# Patient Record
Sex: FEMALE | Race: WHITE | Employment: OTHER | URBAN - METROPOLITAN AREA
[De-identification: names, ages, dates, MRNs, and addresses within clinical notes are randomized per-mention and may not be internally consistent; named-entity substitution may affect disease eponyms.]

---

## 2017-06-20 ENCOUNTER — HOSPITAL ENCOUNTER (OUTPATIENT)
Dept: MAMMOGRAPHY | Age: 77
Discharge: HOME | End: 2017-06-20
Attending: NURSE PRACTITIONER | Admitting: NURSE PRACTITIONER

## 2017-06-20 DIAGNOSIS — Z12.31 ENCOUNTER FOR SCREENING MAMMOGRAM FOR BREAST CANCER: ICD-10-CM

## 2017-07-29 ENCOUNTER — OFFICE VISIT (OUTPATIENT)
Dept: ORTHOPEDIC SURGERY | Age: 77
End: 2017-07-29

## 2017-07-29 VITALS
DIASTOLIC BLOOD PRESSURE: 76 MMHG | SYSTOLIC BLOOD PRESSURE: 128 MMHG | HEIGHT: 62 IN | BODY MASS INDEX: 29.44 KG/M2 | HEART RATE: 66 BPM | WEIGHT: 160 LBS

## 2017-07-29 DIAGNOSIS — M17.12 PRIMARY OSTEOARTHRITIS OF LEFT KNEE: ICD-10-CM

## 2017-07-29 DIAGNOSIS — M25.562 ACUTE PAIN OF LEFT KNEE: Primary | ICD-10-CM

## 2017-07-29 PROCEDURE — 73564 X-RAY EXAM KNEE 4 OR MORE: CPT | Performed by: PHYSICIAN ASSISTANT

## 2017-07-29 PROCEDURE — 99213 OFFICE O/P EST LOW 20 MIN: CPT | Performed by: PHYSICIAN ASSISTANT

## 2017-10-12 ENCOUNTER — HOSPITAL ENCOUNTER (OUTPATIENT)
Dept: SURGERY | Age: 77
Discharge: HOME | End: 2017-10-12
Attending: INTERNAL MEDICINE | Admitting: INTERNAL MEDICINE

## 2017-10-12 VITALS
RESPIRATION RATE: 18 BRPM | WEIGHT: 156 LBS | TEMPERATURE: 97.9 F | DIASTOLIC BLOOD PRESSURE: 79 MMHG | BODY MASS INDEX: 28.71 KG/M2 | SYSTOLIC BLOOD PRESSURE: 135 MMHG | HEIGHT: 62 IN | HEART RATE: 54 BPM | OXYGEN SATURATION: 98 %

## 2017-10-12 DIAGNOSIS — R93.3 ABNORMAL FINDINGS ON DIAGNOSTIC IMAGING OF OTHER PARTS OF DIGESTIVE TRACT: ICD-10-CM

## 2017-10-12 RX ORDER — ALBUTEROL SULFATE 90 UG/1
2 AEROSOL, METERED RESPIRATORY (INHALATION)
COMMUNITY
Start: 2016-10-03 | End: 2019-10-14

## 2017-10-12 RX ORDER — SODIUM CHLORIDE, SODIUM LACTATE, POTASSIUM CHLORIDE, CALCIUM CHLORIDE 600; 310; 30; 20 MG/100ML; MG/100ML; MG/100ML; MG/100ML
INJECTION, SOLUTION INTRAVENOUS CONTINUOUS
Status: DISCONTINUED | OUTPATIENT
Start: 2017-10-12 | End: 2017-10-13 | Stop reason: HOSPADM

## 2017-10-12 RX ORDER — LIDOCAINE HYDROCHLORIDE 10 MG/ML
1 INJECTION, SOLUTION EPIDURAL; INFILTRATION; INTRACAUDAL; PERINEURAL ONCE
Status: DISCONTINUED | OUTPATIENT
Start: 2017-10-12 | End: 2017-10-13 | Stop reason: HOSPADM

## 2017-10-12 RX ORDER — FLUTICASONE PROPIONATE 50 MCG
SPRAY, SUSPENSION (ML) NASAL
COMMUNITY
Start: 2017-01-30

## 2017-10-12 RX ORDER — GUAIFENESIN 600 MG/1
600 TABLET, EXTENDED RELEASE ORAL
COMMUNITY
End: 2019-10-14

## 2017-10-12 RX ADMIN — SODIUM CHLORIDE, SODIUM LACTATE, POTASSIUM CHLORIDE, CALCIUM CHLORIDE: 600; 310; 30; 20 INJECTION, SOLUTION INTRAVENOUS at 09:26

## 2017-10-12 ASSESSMENT — PAIN SCALES - GENERAL: PAINLEVEL_OUTOF10: 0

## 2017-10-12 ASSESSMENT — PAIN - FUNCTIONAL ASSESSMENT: PAIN_FUNCTIONAL_ASSESSMENT: 0-10

## 2017-10-12 NOTE — OP NOTE
Colonoscopy Note    Patient:   Fritz June    YOB: 1940    Facility:   [facility] [Outpatient]  Referring/PCP: Angel Vernon NP  Procedure:   Colonoscopy --diagnostic  Date:     10/12/2017  Endoscopist:  Bull Urbina MD    Preoperative Diagnosis: Abnl CT (? Diverticulitis vs malignancy). Postoperative Diagnosis:  Incomplete colonoscopy due to Diverticulosis w fibrosis and lumenal narrowing    Anesthesia: Versed 4 mg IV, fentanyl 75 mcg IV; Procedural Sedation Time: 8 minutes. Estimated blood loss: < 5 ml    Complications:  None    Description of Procedure:  Informed consent was obtained from the patient after explanation of the procedure including indications, description of the procedure,  benefits and possible risks and complications of the procedure, and alternatives. Questions were answered. The patient's history was reviewed and a directed physical examination was performed prior to the procedure. Patient was monitored throughout the procedure with pulse oximetry and periodic assessment of vital signs. Patient was sedated as noted above. The Nursing staff and I performed a time out. With the patient initially in the left lateral decubitus position, a digital rectal examination was performed and revealed negative without mass, lesions or tenderness. The Olympus video colonoscope was placed in the patient's rectum and advanced without difficulty  to the sigmoid colon. Photographs were taken. The prep was good. Examination of the mucosa was performed during both introduction and withdrawal of the colonoscope. Retroflexed view of the rectum was performed. Findings:     1. Incomplete colonoscopy due to Diverticulosis w fibrosis and lumenal narrowing  2.  Severe diverticulosis     Recommendations:  Consider re-colon w Propofol and Pediatric scope in 1-2 months     Bull Urbina MD       [phone]) [phone]        Bull Urbina MD

## 2017-10-12 NOTE — OP NOTE
Colonoscopy Note    Patient:   Phoebe Lynch    YOB: 1940    Facility:   [facility] [Outpatient]  Referring/PCP: Adela Olszewski, NP  Procedure:   Colonoscopy --diagnostic  Date:     10/12/2017  Endoscopist:  Corazon Callejas MD    Preoperative Diagnosis: Abnl CT (? Rt colitis w Rt abd pain, resolved). Postoperative Diagnosis:  normal    Anesthesia: Propofol. Estimated blood loss: < 5 ml    Complications:  None    Description of Procedure:  Informed consent was obtained from the patient after explanation of the procedure including indications, description of the procedure,  benefits and possible risks and complications of the procedure, and alternatives. Questions were answered. The patient's history was reviewed and a directed physical examination was performed prior to the procedure. Patient was monitored throughout the procedure with pulse oximetry and periodic assessment of vital signs. Patient was sedated as noted above. The Nursing staff and I performed a time out. With the patient initially in the left lateral decubitus position, a digital rectal examination was performed and revealed negative without mass, lesions or tenderness. The Olympus video colonoscope was placed in the patient's rectum and advanced without difficulty  to the cecum, which was identified by the ileocecal valve and appendiceal orifice. Photographs were taken. The prep was good. Examination of the mucosa was performed during both introduction and withdrawal of the colonoscope. Retroflexed view of the rectum was performed. Findings:     1. Normal colonoscopy  2. Small hemorrhoids     Recommendations: Follow up with primary care physician.      Corazon Callejas MD       (O) [phone]        Corazon Callejas MD

## 2017-10-12 NOTE — H&P
Pre-sedation Assessment    History and Physical / Pre-Sedation Assessment  Patient:  Job Locus   :   1940     Intended Procedure: colonoscopy      HPI: Abnl CT (? Rt colitis w Rt abd pain, resolved)    Nurses notes reviewed and agreed. Medications reviewed  Allergies: Allergies   Allergen Reactions    Latex     Morphine And Related Anaphylaxis and Nausea And Vomiting    Percocet [Oxycodone-Acetaminophen] Shortness Of Breath     resp distress    Codeine      Other reaction(s): Not Recorded    Tetracycline     Tetracyclines & Related Swelling    Morphine Nausea And Vomiting           Physical Exam:  Vital Signs: /70   Pulse 67   Temp 97.9 °F (36.6 °C) (Temporal)   Resp 18   Ht 5' 2\" (1.575 m)   Wt 156 lb (70.8 kg)   SpO2 98%   BMI 28.53 kg/m²  Body mass index is 28.53 kg/m². Airway:Normal  Cardiac:Normal  Pulmonary:Normal  Abdomen:Normal  Specific to procedure: none      Pre-Procedure Assessment/Plan:  ASA 2 - Patient with mild systemic disease with no functional limitations    Level of Sedation Plan:Deep sedation    Post Procedure plan: Return to same level of care    I assessed the patient and find that the patient is in satisfactory condition to proceed with the planned procedure and sedation plan. I have explained the risk, benefits, and alternatives to the procedure. The patient understands and agrees to proceed.   Yes    Osorio Christie MD       O) [phone]        Osorio Christie  10:39 AM 10/12/2017

## 2017-11-16 ENCOUNTER — HOSPITAL ENCOUNTER (OUTPATIENT)
Dept: SURGERY | Age: 77
Discharge: HOME | End: 2017-11-16
Attending: INTERNAL MEDICINE | Admitting: INTERNAL MEDICINE

## 2017-11-16 VITALS
BODY MASS INDEX: 28.52 KG/M2 | TEMPERATURE: 97.8 F | HEIGHT: 62 IN | SYSTOLIC BLOOD PRESSURE: 109 MMHG | DIASTOLIC BLOOD PRESSURE: 58 MMHG | HEART RATE: 59 BPM | WEIGHT: 155 LBS | RESPIRATION RATE: 14 BRPM | OXYGEN SATURATION: 98 %

## 2017-11-16 RX ORDER — SODIUM CHLORIDE, SODIUM LACTATE, POTASSIUM CHLORIDE, CALCIUM CHLORIDE 600; 310; 30; 20 MG/100ML; MG/100ML; MG/100ML; MG/100ML
INJECTION, SOLUTION INTRAVENOUS CONTINUOUS
Status: DISCONTINUED | OUTPATIENT
Start: 2017-11-16 | End: 2017-11-17 | Stop reason: HOSPADM

## 2017-11-16 RX ADMIN — SODIUM CHLORIDE, SODIUM LACTATE, POTASSIUM CHLORIDE, CALCIUM CHLORIDE: 600; 310; 30; 20 INJECTION, SOLUTION INTRAVENOUS at 07:28

## 2017-11-16 ASSESSMENT — PAIN DESCRIPTION - LOCATION: LOCATION: ABDOMEN

## 2017-11-16 ASSESSMENT — PAIN SCALES - GENERAL
PAINLEVEL_OUTOF10: 0

## 2017-11-16 ASSESSMENT — PAIN - FUNCTIONAL ASSESSMENT: PAIN_FUNCTIONAL_ASSESSMENT: 0-10

## 2017-11-16 NOTE — H&P
Pre-sedation Assessment    History and Physical / Pre-Sedation Assessment  Patient:  Annamarie Tilley   :   1940     Intended Procedure: colonoscpy      HPI: Abnl CT w ? Diverticulitis vs CA and incomplete colonoscopy due to narrowing    Nurses notes reviewed and agreed. Medications reviewed  Allergies: Allergies   Allergen Reactions    Latex     Morphine And Related Anaphylaxis and Nausea And Vomiting    Percocet [Oxycodone-Acetaminophen] Shortness Of Breath     resp distress    Codeine      Other reaction(s): Not Recorded    Tetracycline     Tetracyclines & Related Swelling    Morphine Nausea And Vomiting           Physical Exam:  Vital Signs: There were no vitals taken for this visit. There is no height or weight on file to calculate BMI. Airway:Normal  Cardiac:Normal  Pulmonary:Normal  Abdomen:Normal  Specific to procedure: none      Pre-Procedure Assessment/Plan:  ASA 3 - Patient with moderate systemic disease with functional limitations    Level of Sedation Plan: Moderate sedation    Post Procedure plan: Return to same level of care    I assessed the patient and find that the patient is in satisfactory condition to proceed with the planned procedure and sedation plan. I have explained the risk, benefits, and alternatives to the procedure. The patient understands and agrees to proceed.   Yes    Travis Arana MD       (O) [phone]        Travis Arana  7:22 AM 2017

## 2017-11-16 NOTE — OP NOTE
Colonoscopy Note    Patient:   Lianne Jonas    YOB: 1940    Facility:   [facility] [Outpatient]  Referring/PCP: Draio Burger NP  Procedure:   Colonoscopy --diagnostic  Date:     11/16/2017  Endoscopist:  Jaclyn Arroyo MD    Preoperative Diagnosis: Abnl CT w ? Diverticulitis vs CA and incomplete colonoscopy due to narrowing. Postoperative Diagnosis:  Diverticulosis and polyp    Anesthesia: Versed 7 mg IV, fentanyl 100 mcg IV    Estimated blood loss: < 5 ml    Complications:  None    Description of Procedure:  Informed consent was obtained from the patient after explanation of the procedure including indications, description of the procedure,  benefits and possible risks and complications of the procedure, and alternatives. Questions were answered. The patient's history was reviewed and a directed physical examination was performed prior to the procedure. Patient was monitored throughout the procedure with pulse oximetry and periodic assessment of vital signs. Patient was sedated as noted above. The Nursing staff and I performed a time out. With the patient initially in the left lateral decubitus position, a digital rectal examination was performed and revealed negative without mass, lesions or tenderness. The Olympus video colonoscope was placed in the patient's rectum and advanced without difficulty  to the cecum, which was identified by the ileocecal valve and appendiceal orifice. Photographs were taken. The prep was good. Examination of the mucosa was performed during both introduction and withdrawal of the colonoscope. Retroflexed view of the rectum was performed. Findings:     1. Small prox transv colon polyp removed w cold-Bx forceps  2. Lt colon diverticulosis and narrowing (necessitating Pediatric scope)     Recommendations:  Await pathology.      Jaclyn Arroyo MD       (Z) [phone]        Jaclyn Arroyo MD

## 2018-04-07 ENCOUNTER — OFFICE VISIT (OUTPATIENT)
Dept: ORTHOPEDIC SURGERY | Age: 78
End: 2018-04-07

## 2018-04-07 VITALS
WEIGHT: 154.98 LBS | HEART RATE: 93 BPM | SYSTOLIC BLOOD PRESSURE: 102 MMHG | BODY MASS INDEX: 28.52 KG/M2 | DIASTOLIC BLOOD PRESSURE: 67 MMHG | HEIGHT: 62 IN

## 2018-04-07 DIAGNOSIS — M54.2 NECK PAIN: Primary | ICD-10-CM

## 2018-04-07 DIAGNOSIS — S16.1XXA CERVICAL STRAIN, ACUTE, INITIAL ENCOUNTER: ICD-10-CM

## 2018-04-07 DIAGNOSIS — M47.812 SPONDYLOSIS OF CERVICAL REGION WITHOUT MYELOPATHY OR RADICULOPATHY: ICD-10-CM

## 2018-04-07 PROCEDURE — 99213 OFFICE O/P EST LOW 20 MIN: CPT | Performed by: PHYSICIAN ASSISTANT

## 2018-04-07 RX ORDER — BACLOFEN 10 MG/1
5 TABLET ORAL NIGHTLY PRN
Qty: 15 TABLET | Refills: 0 | Status: SHIPPED | OUTPATIENT
Start: 2018-04-07 | End: 2019-10-14

## 2018-04-07 RX ORDER — BACLOFEN 10 MG/1
5 TABLET ORAL NIGHTLY PRN
Qty: 15 TABLET | Refills: 0 | Status: CANCELLED | OUTPATIENT
Start: 2018-04-07

## 2018-04-07 RX ORDER — BACLOFEN 10 MG/1
5 TABLET ORAL NIGHTLY PRN
Qty: 15 TABLET | Refills: 0 | Status: SHIPPED | OUTPATIENT
Start: 2018-04-07 | End: 2018-04-07 | Stop reason: SDUPTHER

## 2018-06-04 ENCOUNTER — OFFICE VISIT (OUTPATIENT)
Dept: ORTHOPEDIC SURGERY | Age: 78
End: 2018-06-04

## 2018-06-04 VITALS
HEART RATE: 61 BPM | BODY MASS INDEX: 28.52 KG/M2 | WEIGHT: 155 LBS | HEIGHT: 62 IN | SYSTOLIC BLOOD PRESSURE: 123 MMHG | DIASTOLIC BLOOD PRESSURE: 69 MMHG

## 2018-06-04 DIAGNOSIS — M17.12 PRIMARY OSTEOARTHRITIS OF LEFT KNEE: Chronic | ICD-10-CM

## 2018-06-04 DIAGNOSIS — R52 PAIN: Primary | ICD-10-CM

## 2018-06-04 PROCEDURE — 20610 DRAIN/INJ JOINT/BURSA W/O US: CPT | Performed by: ORTHOPAEDIC SURGERY

## 2018-06-04 PROCEDURE — 99213 OFFICE O/P EST LOW 20 MIN: CPT | Performed by: ORTHOPAEDIC SURGERY

## 2018-08-21 ENCOUNTER — HOSPITAL ENCOUNTER (OUTPATIENT)
Dept: WOMENS IMAGING | Age: 78
Discharge: HOME | End: 2018-08-21
Payer: MEDICARE

## 2018-08-21 DIAGNOSIS — Z12.31 VISIT FOR SCREENING MAMMOGRAM: ICD-10-CM

## 2018-08-21 PROCEDURE — 77063 BREAST TOMOSYNTHESIS BI: CPT

## 2019-01-07 ENCOUNTER — OFFICE VISIT (OUTPATIENT)
Dept: ORTHOPEDIC SURGERY | Age: 79
End: 2019-01-07
Payer: MEDICARE

## 2019-01-07 VITALS — BODY MASS INDEX: 28.52 KG/M2 | WEIGHT: 154.98 LBS | HEIGHT: 62 IN

## 2019-01-07 DIAGNOSIS — M17.12 PRIMARY OSTEOARTHRITIS OF LEFT KNEE: Primary | ICD-10-CM

## 2019-01-07 DIAGNOSIS — S83.242A ACUTE MEDIAL MENISCUS TEAR OF LEFT KNEE, INITIAL ENCOUNTER: ICD-10-CM

## 2019-01-07 DIAGNOSIS — S83.242A TEAR OF MEDIAL MENISCUS OF LEFT KNEE, UNSPECIFIED TEAR TYPE, UNSPECIFIED WHETHER OLD OR CURRENT TEAR, INITIAL ENCOUNTER: ICD-10-CM

## 2019-01-07 PROBLEM — M23.232 DERANGEMENT OF OTHER MEDIAL MENISCUS DUE TO OLD TEAR OR INJURY, LEFT KNEE: Status: ACTIVE | Noted: 2019-01-07

## 2019-01-07 PROCEDURE — 99213 OFFICE O/P EST LOW 20 MIN: CPT | Performed by: ORTHOPAEDIC SURGERY

## 2019-01-21 ENCOUNTER — OFFICE VISIT (OUTPATIENT)
Dept: ORTHOPEDIC SURGERY | Age: 79
End: 2019-01-21
Payer: MEDICARE

## 2019-01-21 VITALS — WEIGHT: 154.98 LBS | BODY MASS INDEX: 28.52 KG/M2 | HEIGHT: 62 IN

## 2019-01-21 DIAGNOSIS — S83.242D ACUTE MEDIAL MENISCUS TEAR OF LEFT KNEE, SUBSEQUENT ENCOUNTER: Primary | Chronic | ICD-10-CM

## 2019-01-21 DIAGNOSIS — M17.12 PRIMARY OSTEOARTHRITIS OF LEFT KNEE: Chronic | ICD-10-CM

## 2019-01-21 PROCEDURE — 99212 OFFICE O/P EST SF 10 MIN: CPT | Performed by: ORTHOPAEDIC SURGERY

## 2019-01-24 ENCOUNTER — TELEPHONE (OUTPATIENT)
Dept: ORTHOPEDIC SURGERY | Age: 79
End: 2019-01-24

## 2019-01-28 ENCOUNTER — OFFICE VISIT (OUTPATIENT)
Dept: ORTHOPEDIC SURGERY | Age: 79
End: 2019-01-28
Payer: MEDICARE

## 2019-01-28 DIAGNOSIS — M17.12 PRIMARY OSTEOARTHRITIS OF LEFT KNEE: Primary | Chronic | ICD-10-CM

## 2019-01-28 PROCEDURE — 20610 DRAIN/INJ JOINT/BURSA W/O US: CPT | Performed by: ORTHOPAEDIC SURGERY

## 2019-02-04 ENCOUNTER — OFFICE VISIT (OUTPATIENT)
Dept: ORTHOPEDIC SURGERY | Age: 79
End: 2019-02-04
Payer: MEDICARE

## 2019-02-04 DIAGNOSIS — M17.12 PRIMARY OSTEOARTHRITIS OF LEFT KNEE: Primary | Chronic | ICD-10-CM

## 2019-02-04 PROCEDURE — 20610 DRAIN/INJ JOINT/BURSA W/O US: CPT | Performed by: ORTHOPAEDIC SURGERY

## 2019-02-08 ENCOUNTER — OFFICE VISIT (OUTPATIENT)
Dept: ORTHOPEDIC SURGERY | Age: 79
End: 2019-02-08
Payer: MEDICARE

## 2019-02-08 DIAGNOSIS — M17.12 PRIMARY OSTEOARTHRITIS OF LEFT KNEE: Primary | Chronic | ICD-10-CM

## 2019-02-08 PROCEDURE — 20610 DRAIN/INJ JOINT/BURSA W/O US: CPT | Performed by: ORTHOPAEDIC SURGERY

## 2019-02-13 ENCOUNTER — HOSPITAL ENCOUNTER (OUTPATIENT)
Dept: PHYSICAL THERAPY | Age: 79
Setting detail: THERAPIES SERIES
Discharge: HOME | End: 2019-02-13
Payer: MEDICARE

## 2019-02-13 PROCEDURE — 97161 PT EVAL LOW COMPLEX 20 MIN: CPT | Performed by: PHYSICAL THERAPIST

## 2019-02-13 PROCEDURE — 97110 THERAPEUTIC EXERCISES: CPT | Performed by: PHYSICAL THERAPIST

## 2019-02-13 PROCEDURE — G8978 MOBILITY CURRENT STATUS: HCPCS | Performed by: PHYSICAL THERAPIST

## 2019-02-13 PROCEDURE — G8979 MOBILITY GOAL STATUS: HCPCS | Performed by: PHYSICAL THERAPIST

## 2019-02-27 ENCOUNTER — HOSPITAL ENCOUNTER (OUTPATIENT)
Dept: PHYSICAL THERAPY | Age: 79
Setting detail: THERAPIES SERIES
Discharge: HOME | End: 2019-02-27
Payer: MEDICARE

## 2019-02-27 PROCEDURE — 97110 THERAPEUTIC EXERCISES: CPT | Performed by: PHYSICAL THERAPIST

## 2019-03-25 ENCOUNTER — TELEPHONE (OUTPATIENT)
Dept: ORTHOPEDIC SURGERY | Age: 79
End: 2019-03-25

## 2019-03-27 ENCOUNTER — OFFICE VISIT (OUTPATIENT)
Dept: ORTHOPEDIC SURGERY | Age: 79
End: 2019-03-27
Payer: MEDICARE

## 2019-03-27 VITALS
BODY MASS INDEX: 28.52 KG/M2 | HEIGHT: 62 IN | HEART RATE: 77 BPM | DIASTOLIC BLOOD PRESSURE: 72 MMHG | SYSTOLIC BLOOD PRESSURE: 127 MMHG | WEIGHT: 154.98 LBS

## 2019-03-27 DIAGNOSIS — M17.0 PRIMARY OSTEOARTHRITIS OF BOTH KNEES: ICD-10-CM

## 2019-03-27 DIAGNOSIS — M25.562 CHRONIC PAIN OF LEFT KNEE: ICD-10-CM

## 2019-03-27 DIAGNOSIS — G89.29 CHRONIC PAIN OF LEFT KNEE: ICD-10-CM

## 2019-03-27 DIAGNOSIS — M22.41 CHONDROMALACIA OF BOTH PATELLAE: ICD-10-CM

## 2019-03-27 DIAGNOSIS — M22.42 CHONDROMALACIA OF BOTH PATELLAE: ICD-10-CM

## 2019-03-27 DIAGNOSIS — M25.561 ACUTE PAIN OF RIGHT KNEE: Primary | ICD-10-CM

## 2019-03-27 PROCEDURE — 99214 OFFICE O/P EST MOD 30 MIN: CPT | Performed by: FAMILY MEDICINE

## 2019-03-27 PROCEDURE — L1812 KO ELASTIC W/JOINTS PRE OTS: HCPCS | Performed by: FAMILY MEDICINE

## 2019-03-27 PROCEDURE — 20610 DRAIN/INJ JOINT/BURSA W/O US: CPT | Performed by: FAMILY MEDICINE

## 2019-03-27 RX ORDER — MELOXICAM 15 MG/1
15 TABLET ORAL DAILY
Qty: 30 TABLET | Refills: 3 | Status: SHIPPED | OUTPATIENT
Start: 2019-03-27 | End: 2019-04-22 | Stop reason: SINTOL

## 2019-04-02 ENCOUNTER — HOSPITAL ENCOUNTER (OUTPATIENT)
Dept: PHYSICAL THERAPY | Age: 79
Setting detail: THERAPIES SERIES
Discharge: HOME | End: 2019-04-02
Payer: MEDICARE

## 2019-04-02 PROCEDURE — 97140 MANUAL THERAPY 1/> REGIONS: CPT | Performed by: PHYSICAL THERAPIST

## 2019-04-02 PROCEDURE — 97110 THERAPEUTIC EXERCISES: CPT | Performed by: PHYSICAL THERAPIST

## 2019-04-02 NOTE — PROGRESS NOTES
[place] [facility] [facility], [address]  Phone: [phone]  Fax [phone]      Physical Therapy Progress Note  Date: 2019        Patient Name:  Champ Escobar    :  1940  MRN: [id number]  Restrictions/Precautions:     Medical/Treatment Diagnosis Information:   Diagnosis: M17.12 (ICD-10-CM) - Primary osteoarthritis of left knee  Treatment Diagnosis: Left knee OA  M16.12,  Left knee pain  X80.334  Insurance/Certification information:  PT Insurance Information: 19 [employer] MC - $150DED(MET) - $0CP - PT/OT MED NEC - 96/4% - NO AUTH  Physician Information:  Referring Practitioner: Dr. Fonseca [facility] of care signed (Y/N):    Visit# / total visits:  10/  Pain level: /10     G-Code (if applicable):      Date G-Code Applied:  19  PT G-Codes  Functional Assessment Tool Used: LEFS  Score: 44     Time Period for Report:     Cancels/No-shows to date:     Plan of Care/Treatment to date:  [x] Therapeutic Exercise    [x] Modalities:  [] Therapeutic Activity     [x] Ultrasound  [x] Electrical Stim  [] Gait Training      [] Cervical Traction    [] Lumbar Traction  [x] Neuromuscular Re-education  [x] Cold/hotpack [] Iontophoresis  [x] Instruction in HEP      Other:  [x] Manual Therapy       []    [] Aquatic Therapy       []                    ? Significant Findings At Last Visit/Comments:    Subjective:   Pt thinks she got her leg caught in sheets. Hard to get out of car or climb stairs. Objective:  Test measurements:        Test used Initial score Current Score   Pain Summary VAS  4   Functional questionnaire LEFS 35% 44   ROM flexion  130    extension  0   Strength quad  good    SLR  indep    flexion       Assessment:  Summary: Left knee feels better.   Right is painful  Patient's response to treatment:     Progress towards goals:       Current Frequency/Duration:   # Days per week: [x] 1 day # Weeks: [] 1 week [] 7 weeks     [] 2 days? [] 2 weeks [] 8 weeks     [] 3 days   [] 3 weeks [] 9 weeks     [] 4 days   [x] 4 weeks [] 10 weeks      [] 5 days   [] 5 weeks [] 11 weeks          [] 6 weeks [] 12 weeks    Rehab Potential: [] Excellent [x] Good [] Fair  [] Poor     Goal Status:  [] Achieved [] Partially Achieved  [] Not Achieved     Patient Status: [x] Continue per initial plan of Care     [] Patient now discharged     [] Additional visits requested, Please re-certify for additional visits:      Requested frequency/duration:  1x  4 weeks. Electronically signed by:  Geovanni Ruiz PT    If you have any questions or concerns, please don't hesitate to call.   Thank you for your referral.    Physician Signature:________________________________Date:__________________  By signing above, therapists plan is approved by physician

## 2019-04-02 NOTE — FLOWSHEET NOTE
[place] [facility] [facility] [facility], [address]  Phone: [phone]  Fax [phone]    Physical Therapy Daily Treatment Note  Date:  2019    Patient Name:  Davey Francisco    :  1940  MRN: [id number]  Restrictions/Precautions:    Physician Information:  Referring Practitioner: Dr. Danielle Landeros  Medical/Treatment Diagnosis Information:  · Diagnosis: M17.12 (ICD-10-CM) - Primary osteoarthritis of left knee  · Treatment Diagnosis: Left knee OA  M16.12,  Left knee pain  M25.552    [x] Conservative / [] Surgical - DOS:  Therapy Diagnosis/Practice Pattern:  Practice Pattern D: Connective Tissue Dysfunction  Insurance/Certification information:  PT Insurance Information: 19 [employer]  - $150DED(MET) - $0CP - PT/OT MED NEC - 96/4% - NO AUTH  Plan of care signed: [] YES  [] NO  Number of Comorbidities:  []0     [x]1-2    []3+  Date of Patient follow up with Physician:     G-Code (if applicable):      Date G-Code Applied:  19  PT G-Codes  Functional Assessment Tool Used: LEFS  Score: 35%  Functional Limitation: Mobility: Walking and moving around  Mobility: Walking and Moving Around Current Status (): At least 20 percent but less than 40 percent impaired, limited or restricted  Mobility: Walking and Moving Around Goal Status (): At least 1 percent but less than 20 percent impaired, limited or restricted    Progress Note: [x]  Yes  []  No  Next due by: Visit #10        Latex Allergy:  [x]NO      []YES  Preferred Language for Healthcare:   [x]English       []other:    Visit # Insurance Allowable Reporting Period   3 Med Caron Lints Begin Date: 2019               End Date:      RECERT DUE BY:     SUBJECTIVE: Pt is having pain in her right knee now. Feels like it got caught up in covers while sleeping.       OBJECTIVE:   Observation: tightness through L HS;   Palpation: no TTP noted     Test used Initial score Current Score Pain Summary VAS  4   Functional questionnaire LEFS 35% 44   ROM flexion  130    extension  0   Strength quad  good    SLR  indep    flexion          RESTRICTIONS/PRECAUTIONS:     Exercises/Interventions:     Therapeutic Ex Sets/reps Notes   Quad set x10                HAS stretch 3x20\"    Calf stretch 3x20\"               Supine hip flex s 3x  :20                             Manual Intervention      PROM, patellar mobs 4' min    Prone quad s/   Seated HS 4 min                        NMR re-education                        Therapeutic Exercise and NMR EXR  [x] (23482) Provided verbal/tactile cueing for activities related to strengthening, flexibility, endurance, ROM for improvements in LE, proximal hip, and core control with self care, mobility, lifting, ambulation.  [] (12995) Provided verbal/tactile cueing for activities related to improving balance, coordination, kinesthetic sense, posture, motor skill, proprioception  to assist with LE, proximal hip, and core control in self care, mobility, lifting, ambulation and eccentric single leg control.      NMR and Therapeutic Activities:    [x] (69752 or 85806) Provided verbal/tactile cueing for activities related to improving balance, coordination, kinesthetic sense, posture, motor skill, proprioception and motor activation to allow for proper function of core, proximal hip and LE with self care and ADLs  [] (08683) Gait Re-education- Provided training and instruction to the patient for proper LE, core and proximal hip recruitment and positioning and eccentric body weight control with ambulation re-education including up and down stairs     Home Exercise Program:    [x] (35030) Reviewed/Progressed HEP activities related to strengthening, flexibility, endurance, ROM of core, proximal hip and LE for functional self-care, mobility, lifting and ambulation/stair navigation   [] (41152)Reviewed/Progressed HEP activities related to improving balance, coordination, kinesthetic sense, posture, motor skill, proprioception of core, proximal hip and LE for self care, mobility, lifting, and ambulation/stair navigation      Manual Treatments:  PROM / STM / Oscillations-Mobs:  G-I, II, III, IV (PA's, Inf., Post.)  [] (54106) Provided manual therapy to mobilize LE, proximal hip and/or LS spine soft tissue/joints for the purpose of modulating pain, promoting relaxation,  increasing ROM, reducing/eliminating soft tissue swelling/inflammation/restriction, improving soft tissue extensibility and allowing for proper ROM for normal function with self care, mobility, lifting and ambulation. Modalities:       [] GR/ESU 15 min    [] GR 15 min  [] ESU     [] CP    [] MHP    [x] declined     Charges:  Timed Code Treatment Minutes: 40'   Total Treatment Minutes: 36'     [] EVAL (LOW) 455 [id number] (typically 20 minutes face-to-face)  [] EVAL (MOD) 51313 (typically 30 minutes face-to-face)  [] EVAL (HIGH) 08799 (typically 45 minutes face-to-face)  [] RE-EVAL     [x] TY(51041) x  2   [] IONTO  [] NMR (66844) x      [] VASO  [x] Manual (32784) x  1    [] Other:  [] TA x       [] Mech Traction (93143)  [] ES(attended) (83270)      [] ES (un) (54971):     GOALS:   Patient stated goal:  return to walking and putt off knee surgery  Therapist goals for Patient:   Short Term Goals: To be achieved in: 2 weeks  1. Independent in HEP and progression per patient tolerance, in order to prevent re-injury. 2. Patient will have a decrease in pain to facilitate improvement in movement, function, and ADLs as indicated by Functional Deficits.     Long Term Goals: To be achieved in: 4 weeks  1. Disability index score of 18% or less for the LEFS to assist with reaching prior level of function. 2. Patient will demonstrate increased AROM  135 knee flexion to allow for proper joint functioning as indicated by patients Functional Deficits.    3. Patient will demonstrate an increase in Strength to good proximal hip/LE strength and control, to 5/5 to allow for proper functional mobility as indicated by patients Functional Deficits. 4. Patient will return to walking longer than 30 minutes functional activities without increased symptoms or restriction. 5. Patient will report improved sleep without waking up secondary to pain. New or Updated Goals (if applicable):  [x] No change to goals established upon initial eval/last progress note:  New Goals:    Progression Towards Functional goals:   [] Patient is progressing as expected towards functional goals listed. [] Progression is slowed due to complexities listed. [] Progression has been slowed due to co-morbidities. [x] Plan just implemented, too soon to assess goals progression  [] Other:     ASSESSMENT: Contiued pt ed on HEP and importance on exercise. Initiated HR/TR, SLS and calf stretch on incline board without c/o pain. Pt I on HEP. [] Improvement noted relative to goals:  [] No Improvement noted related to goals:  Summary/Patient's response to treatment:     Treatment/Activity Tolerance:  [x] Patient tolerated treatment well [] Patient limited by fatique  [] Patient limited by pain  [] Patient limited by other medical complications  [] Other:     Prognosis: [x] Good [] Fair  [] Poor    Patient Requires Follow-up: [x] Yes  [] No    PLAN: See eval  [x] Continue per plan of care [] Alter current plan (see comments)  [] Plan of care initiated [] Hold pending MD visit [] Discharge    Electronically signed by: Zachariah Wiley SPT  Therapist was present, directed the patient's care, made skilled judgement, and was responsible for assessment and treatment of the patient.

## 2019-04-10 ENCOUNTER — HOSPITAL ENCOUNTER (OUTPATIENT)
Dept: PHYSICAL THERAPY | Age: 79
Setting detail: THERAPIES SERIES
Discharge: HOME | End: 2019-04-10
Payer: MEDICARE

## 2019-04-10 PROCEDURE — 97110 THERAPEUTIC EXERCISES: CPT | Performed by: PHYSICAL THERAPIST

## 2019-04-10 PROCEDURE — 97140 MANUAL THERAPY 1/> REGIONS: CPT | Performed by: PHYSICAL THERAPIST

## 2019-04-10 NOTE — FLOWSHEET NOTE
Functional questionnaire LEFS 35% 44   ROM flexion  130    extension  0   Strength quad  good    SLR  indep    flexion          RESTRICTIONS/PRECAUTIONS:     Exercises/Interventions:     Therapeutic Ex Sets/reps Notes   Quad set x10    SAQ 2 x 10 B    SLR 2 x10    SL ABD 2 x10 B    clamshells [place] x 20     LAQ x10    HAS stretch 3x20\"    Calf stretch 3x20\"    mini squat x10    Heel raise/ toe raise 2x10         Supine hip flex s 3x  :20                             Manual Intervention      PROM, patellar mobs 4' min    Prone quad s/   Seated HS 4 min                        NMR re-education                        Therapeutic Exercise and NMR EXR  [x] (06998) Provided verbal/tactile cueing for activities related to strengthening, flexibility, endurance, ROM for improvements in LE, proximal hip, and core control with self care, mobility, lifting, ambulation.  [] (32207) Provided verbal/tactile cueing for activities related to improving balance, coordination, kinesthetic sense, posture, motor skill, proprioception  to assist with LE, proximal hip, and core control in self care, mobility, lifting, ambulation and eccentric single leg control.      NMR and Therapeutic Activities:    [x] (00271 or 96978) Provided verbal/tactile cueing for activities related to improving balance, coordination, kinesthetic sense, posture, motor skill, proprioception and motor activation to allow for proper function of core, proximal hip and LE with self care and ADLs  [] (03011) Gait Re-education- Provided training and instruction to the patient for proper LE, core and proximal hip recruitment and positioning and eccentric body weight control with ambulation re-education including up and down stairs     Home Exercise Program:    [x] (04372) Reviewed/Progressed HEP activities related to strengthening, flexibility, endurance, ROM of core, proximal hip and LE for functional self-care, mobility, lifting and ambulation/stair navigation   [] (80631)Reviewed/Progressed HEP activities related to improving balance, coordination, kinesthetic sense, posture, motor skill, proprioception of core, proximal hip and LE for self care, mobility, lifting, and ambulation/stair navigation      Manual Treatments:  PROM / STM / Oscillations-Mobs:  G-I, II, III, IV (PA's, Inf., Post.)  [] (74910) Provided manual therapy to mobilize LE, proximal hip and/or LS spine soft tissue/joints for the purpose of modulating pain, promoting relaxation,  increasing ROM, reducing/eliminating soft tissue swelling/inflammation/restriction, improving soft tissue extensibility and allowing for proper ROM for normal function with self care, mobility, lifting and ambulation. Modalities:       [] GR/ESU 15 min    [] GR 15 min  [] ESU     [] CP    [] MHP    [x] declined     Charges:  Timed Code Treatment Minutes: 40'   Total Treatment Minutes: 36'     [] EVAL (LOW) 455 [id number] (typically 20 minutes face-to-face)  [] EVAL (MOD) 71215 (typically 30 minutes face-to-face)  [] EVAL (HIGH) 08871 (typically 45 minutes face-to-face)  [] RE-EVAL     [x] BU(35534) x  2   [] IONTO  [] NMR (48938) x      [] VASO  [x] Manual (26425) x  1    [] Other:  [] TA x       [] Mech Traction (49838)  [] ES(attended) (51661)      [] ES (un) (75805):     GOALS:   Patient stated goal:  return to walking and putt off knee surgery  Therapist goals for Patient:   Short Term Goals: To be achieved in: 2 weeks  1. Independent in HEP and progression per patient tolerance, in order to prevent re-injury. 2. Patient will have a decrease in pain to facilitate improvement in movement, function, and ADLs as indicated by Functional Deficits.     Long Term Goals: To be achieved in: 4 weeks  1. Disability index score of 18% or less for the LEFS to assist with reaching prior level of function.    2. Patient will demonstrate increased AROM  135 knee flexion to allow for proper joint functioning as indicated by patients Functional Deficits. 3. Patient will demonstrate an increase in Strength to good proximal hip/LE strength and control, to 5/5 to allow for proper functional mobility as indicated by patients Functional Deficits. 4. Patient will return to walking longer than 30 minutes functional activities without increased symptoms or restriction. 5. Patient will report improved sleep without waking up secondary to pain. New or Updated Goals (if applicable):  [x] No change to goals established upon initial eval/last progress note:  New Goals:    Progression Towards Functional goals:   [x] Patient is progressing as expected towards functional goals listed. [] Progression is slowed due to complexities listed. [] Progression has been slowed due to co-morbidities. [] Plan just implemented, too soon to assess goals progression  [] Other:     ASSESSMENT: Contiued pt ed on HEP and importance on exercise. Initiated HR/TR, SLS and calf stretch on incline board without c/o pain. Pt I on HEP.   [] Improvement noted relative to goals:  [] No Improvement noted related to goals:  Summary/Patient's response to treatment:     Treatment/Activity Tolerance:  [x] Patient tolerated treatment well [] Patient limited by fatique  [] Patient limited by pain  [] Patient limited by other medical complications  [] Other:     Prognosis: [x] Good [] Fair  [] Poor    Patient Requires Follow-up: [x] Yes  [] No    PLAN: See eval  [x] Continue per plan of care [] Alter current plan (see comments)  [] Plan of care initiated [] Hold pending MD visit [] Discharge    Electronically signed by: Iván Mcrae, PT

## 2019-04-17 ENCOUNTER — APPOINTMENT (OUTPATIENT)
Dept: PHYSICAL THERAPY | Age: 79
End: 2019-04-17
Payer: MEDICARE

## 2019-04-22 ENCOUNTER — OFFICE VISIT (OUTPATIENT)
Dept: ORTHOPEDIC SURGERY | Age: 79
End: 2019-04-22
Payer: MEDICARE

## 2019-04-22 ENCOUNTER — HOSPITAL ENCOUNTER (OUTPATIENT)
Dept: PHYSICAL THERAPY | Age: 79
Setting detail: THERAPIES SERIES
Discharge: HOME | End: 2019-04-22
Payer: MEDICARE

## 2019-04-22 VITALS — WEIGHT: 154.98 LBS | HEIGHT: 62 IN | BODY MASS INDEX: 28.52 KG/M2

## 2019-04-22 DIAGNOSIS — M25.562 CHRONIC PAIN OF LEFT KNEE: Primary | ICD-10-CM

## 2019-04-22 DIAGNOSIS — M17.0 PRIMARY OSTEOARTHRITIS OF BOTH KNEES: ICD-10-CM

## 2019-04-22 DIAGNOSIS — G89.29 CHRONIC PAIN OF LEFT KNEE: Primary | ICD-10-CM

## 2019-04-22 DIAGNOSIS — M25.561 ACUTE PAIN OF RIGHT KNEE: ICD-10-CM

## 2019-04-22 PROCEDURE — 97140 MANUAL THERAPY 1/> REGIONS: CPT | Performed by: PHYSICAL THERAPIST

## 2019-04-22 PROCEDURE — 97110 THERAPEUTIC EXERCISES: CPT | Performed by: PHYSICAL THERAPIST

## 2019-04-22 PROCEDURE — 99213 OFFICE O/P EST LOW 20 MIN: CPT | Performed by: FAMILY MEDICINE

## 2019-04-22 RX ORDER — MELOXICAM 7.5 MG/1
7.5 TABLET ORAL DAILY
Qty: 30 TABLET | Refills: 3 | Status: SHIPPED | OUTPATIENT
Start: 2019-04-22 | End: 2020-03-03

## 2019-04-22 NOTE — PROGRESS NOTES
Chief Complaint    Follow-up (Bilateral knees: anterior medial compartment OA and presistent synovitis, last seen had injection on 3/27/19 for both knees, and given economy hinge brace for right knee; the injection had helped, is still taking the anti-inflammatory only takes a 1/2 of the pill unsure if he she could contiune with anti-inflammatory or stop taking it all together )    Follow-up worsening right knee pain with known history of left knee multiple permanent osteoarthritis status post completion of left knee Euflexxa by Dr. Maggi Monique on 2/8/2019 with striatum left knee pain    History of Present Illness:  Champ Escobar is a 70 y.o. female. She states she's had increasing soreness and pain in the knee over the last 8-14 months. She has had a lot of difficulty with bent knee activities especially kneeling in Judaism. Stairs also bothers her knee. She doesn't have pronounced locking or giving way. She describes mostly soreness and aching pain. Pain level is 4/10. Rest and ice decrease the pain. Soreness appears to be primarily anterior with some occasional mild medial soreness. Prabhjot Navarro was last seen in the office on 2/8/2019 when she completed viscous supplementation with Euflexxa by Dr. 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 She has been having ongoing pain symptoms to her left knee and has been attempting to perform her home-based exercise program but this is ineffective. She is having fairly consistent rest pain at 3-4-10 with more sharp 6-7 out of 10 pain with positional changes and distance walking. Going up and down stairs is painful as is pivoting. More recently over the past 4 weeks since late February 2019, she has noticed compensatory anterior and medial pain to her right knee.   She has not had this imaged. It is primarily aching in nature and she denies active locking or catching. She does have a history of treated hepatitis C but with her history she has always been a little bit reluctant to take oral anti-inflammatories. She has had some several buckling particularly on the right but is not utilized bracing. She is being seen today for evaluation Dr. Sandrea Kussmaul absence for further treatment recommendations. She was last seen in the office on 3/27/2019 and was continued on treatment for her left knee and started treatment for her right knee. She presents back today stating that both knees are feeling outstanding today are 95+ percent improved. She has been a little lax and performing her home-based exercises but has gotten benefit from taking her anti-inflammatories with meloxicam.  She was getting a little bit dizzy with a 15 mg split it in half and 7.5 mg a block scan has been very helpful for us. She would like to get back into her walking program.  She did clear taking the meloxicam with her hepatologist.  Denies locking catching or instability symptoms. She sleeps more comfortably and is no longer having pain with prolonged standing walking and stair climbing. Denies mechanical or instability symptoms. She is currently very pleased her progress. Pain Assessment  Location of Pain: Knee  Location Modifiers: Left, Right  Severity of Pain: 0    Medical History:  Patient's medications, allergies, past medical, surgical, social and family histories were reviewed and updated as appropriate. Review of Systems:  Pertinent items are noted in HPI  Review of systems reviewed from Patient History Form dated on June 4, 2018 and available in the patient's chart under the Media tab. Vital Signs:  Ht 5' 2.01\" (1.575 m)   Wt 154 lb 15.7 oz (70.3 kg)   BMI 28.34 kg/m²     General Exam:   Constitutional: Patient is adequately groomed with no evidence of malnutrition  Mental Status:  The patient is oriented to time, place and person. The patient's mood and affect are appropriate. Lymphatic: The lymphatic examination bilaterally reveals all areas to be without enlargement or induration. Knee Examination:    Left Inspection:  No significant effusion or deformity left knee    Palpation:  Left knee reveals good ligamentous stability in all directions with negative Lachman, drawer, pivot shift and posterior drawer. Good medial and lateral stability at 0 and 30°. She has moderate to advanced tenderness peripatellar and retropatellar medial and lateral facet. Negative apprehension sign. Minimal medial or lateral joint line tenderness and negative Natasha sign. No masses in the popliteal space. Range of Motion:  0-130° left    Strength:  Quadriceps strength 3+ over 5 left    Special Tests:  Negative Homans sign left    Skin: There are no rashes, ulcerations or lesions. Gait: Patient ambulating without ambulatory aids    Reflex intact    Additional Comments:   Evaluation of her right knee does reveal only a trace knee joint effusion although she does have some patellofemoral and medial compartment crepitation. She does exhibit markedly improved medial joint line tenderness with a now negative Natasha's without appreciable click. Substantial lateral joint line tenderness with negative meniscal clicks. She does have reasonable range of motion with flexion to 125. I do not sense high-grade instability. She has fair Quad tone with strength being about 4-5 with flexion and extension. No evidence of obvious instability. She has a negative patellar grind test on the right with negative apprehension test.  Screening hip testing appears to be fairly benign. Additional Examinations:         Right Lower Extremity: Examination of the right lower extremity does not show any tenderness, deformity or injury. Range of motion is unremarkable. There is no gross instability.   There are no rashes, ulcerations or lesions. Strength and tone are normal.  Left Lower Extremity: Examination of the left lower extremity does not show any tenderness, deformity or injury. Range of motion is unremarkable. There is no gross instability. There are no rashes, ulcerations or lesions. Strength and tone are normal.    Radiology:     X-rays obtained and reviewed in office today:  Views standing AP, PA, lateral and sunrise  Location right knee  Impression moderate advanced patellofemoral primary osteoarthritis with intermediate medial compartment narrowing         Assessment :  #1. Symptomatically improved left knee multicompartment significant anterior medial compartment arthritis with improved synovitis status post completion of Euflexxa left knee 2/8/2019. #2.  Weeks status post improved reactive right knee anterior medial compartment osteoarthritis with improved synovitis and pseudo-buckling    Impression:  Encounter Diagnoses   Name Primary?  Chronic pain of left knee Yes    Acute pain of right knee     Primary osteoarthritis of both knees        Office Procedures:  No orders of the defined types were placed in this encounter. Treatment Plan:  Treatment options were discussed with Leonidas Mann today. We did review her right knee weightbearing plain films as well as her previous MRI of her left knee and treatments thus far. Overall her symptoms have improved substantially in both knees feel very good today. I would recommend continuing with her meloxicam 7.5 mg daily continuing with her exercise program.  She does have a wraparound knee brace for her right knee. She has gotten approval of taking her meloxicam 7.5 mg from her medical physicians. She may continue with her patellar protection program and potential for repeating Visco supplementation with Euflexxa at 6 month intervals was discussed. She would be eligible on the right knee after 8/8/2019.   We will see her back at that time but she was strongly encouraged to continue with her home-based exercises and exercise modification in the interim. She will contact us with questions or concerns.

## 2019-04-22 NOTE — FLOWSHEET NOTE
[id number], [address] [place] [address] [place]  Phone: [phone]  Fax [phone]    Physical Therapy Daily Treatment Note  Date:  2019    Patient Name:  Mary Alice Tineo    :  1940  MRN: [id number]  Restrictions/Precautions:    Physician Information:  Referring Practitioner: Dr. Ramu Mario  Medical/Treatment Diagnosis Information:  · Diagnosis: M17.12 (ICD-10-CM) - Primary osteoarthritis of left knee  · Treatment Diagnosis: Left knee OA  M16.12,  Left knee pain  M25.552    [x] Conservative / [] Surgical - DOS:  Therapy Diagnosis/Practice Pattern:  Practice Pattern D: Connective Tissue Dysfunction  Insurance/Certification information:  PT Insurance Information: 19 [employer]  - $150DED(MET) - $0CP - PT/OT MED NEC - 96/4% - NO AUTH  Plan of care signed: [] YES  [] NO  Number of Comorbidities:  []0     [x]1-2    []3+  Date of Patient follow up with Physician:     G-Code (if applicable):      Date G-Code Applied:  19  PT G-Codes  Functional Assessment Tool Used: LEFS  Score: 35%  Functional Limitation: Mobility: Walking and moving around  Mobility: Walking and Moving Around Current Status (): At least 20 percent but less than 40 percent impaired, limited or restricted  Mobility: Walking and Moving Around Goal Status (): At least 1 percent but less than 20 percent impaired, limited or restricted    Progress Note: [x]  Yes  []  No  Next due by: Visit #10        Latex Allergy:  [x]NO      []YES  Preferred Language for Healthcare:   [x]English       []other:    Visit # Insurance Allowable Reporting Period   5 [facility] Begin Date: 2019               End Date:      RECERT DUE BY:     SUBJECTIVE: Pt has been caring for sister in hospital.  She has not performed HEP.      OBJECTIVE:   Observation: tightness through L HS;   Palpation: no TTP noted     Test used Initial score Current Score   Pain Summary VAS 2-5 4 Functional questionnaire LEFS 35% 23   ROM flexion 133 130    extension 0 0   Strength quad  good    SLR  indep    flexion          RESTRICTIONS/PRECAUTIONS:     Exercises/Interventions:     Therapeutic Ex Sets/reps Notes   Quad set x10    SAQ 2 x 10 B    SLR 2 x10    SL ABD 2 x10 B    clamshells [place] x 20     LAQ x10    HAS stretch 3x20\"    Calf stretch 3x20\"    mini squat x10    Heel raise/ toe raise 2x10         Supine hip flex s 3x  :20         Bike 8 min. Manual Intervention      PROM, patellar mobs 4' min    /  HS/  ITB s/  gastroc s 4 min                        NMR re-education                        Therapeutic Exercise and NMR EXR  [x] (06508) Provided verbal/tactile cueing for activities related to strengthening, flexibility, endurance, ROM for improvements in LE, proximal hip, and core control with self care, mobility, lifting, ambulation.  [] (39943) Provided verbal/tactile cueing for activities related to improving balance, coordination, kinesthetic sense, posture, motor skill, proprioception  to assist with LE, proximal hip, and core control in self care, mobility, lifting, ambulation and eccentric single leg control.      NMR and Therapeutic Activities:    [x] (61751 or 60291) Provided verbal/tactile cueing for activities related to improving balance, coordination, kinesthetic sense, posture, motor skill, proprioception and motor activation to allow for proper function of core, proximal hip and LE with self care and ADLs  [] (59776) Gait Re-education- Provided training and instruction to the patient for proper LE, core and proximal hip recruitment and positioning and eccentric body weight control with ambulation re-education including up and down stairs     Home Exercise Program:    [x] (26925) Reviewed/Progressed HEP activities related to strengthening, flexibility, endurance, ROM of core, proximal hip and LE for functional self-care, mobility, lifting and ambulation/stair patients Functional Deficits. improving  3. Patient will demonstrate an increase in Strength to good proximal hip/LE strength and control, to 5/5 to allow for proper functional mobility as indicated by patients Functional Deficits. met  4. Patient will return to walking longer than 30 minutes functional activities without increased symptoms or restriction. Met  5. Patient will report improved sleep without waking up secondary to pain. met  New or Updated Goals (if applicable):  [x] No change to goals established upon initial eval/last progress note:  New Goals:    Progression Towards Functional goals:   [x] Patient is progressing as expected towards functional goals listed. [] Progression is slowed due to complexities listed. [] Progression has been slowed due to co-morbidities. [] Plan just implemented, too soon to assess goals progression  [] Other:     ASSESSMENT: Contiued pt ed on HEP and importance on exercise. Initiated HR/TR, SLS and calf stretch on incline board without c/o pain. Pt I on HEP.   [] Improvement noted relative to goals:  [] No Improvement noted related to goals:  Summary/Patient's response to treatment:     Treatment/Activity Tolerance:  [x] Patient tolerated treatment well [] Patient limited by fatique  [] Patient limited by pain  [] Patient limited by other medical complications  [] Other:     Prognosis: [x] Good [] Fair  [] Poor    Patient Requires Follow-up: [x] Yes  [] No    PLAN: See eval  [] Continue per plan of care [] Alter current plan (see comments)  [] Plan of care initiated [] Hold pending MD visit [x] Discharge    Electronically signed by: Genevieve Cardona PT

## 2019-07-15 DIAGNOSIS — G89.29 CHRONIC PAIN OF LEFT KNEE: ICD-10-CM

## 2019-07-15 DIAGNOSIS — M25.561 ACUTE PAIN OF RIGHT KNEE: ICD-10-CM

## 2019-07-15 DIAGNOSIS — M22.42 CHONDROMALACIA OF BOTH PATELLAE: ICD-10-CM

## 2019-07-15 DIAGNOSIS — M17.0 PRIMARY OSTEOARTHRITIS OF BOTH KNEES: Primary | ICD-10-CM

## 2019-07-15 DIAGNOSIS — M25.562 CHRONIC PAIN OF LEFT KNEE: ICD-10-CM

## 2019-07-15 DIAGNOSIS — M22.41 CHONDROMALACIA OF BOTH PATELLAE: ICD-10-CM

## 2019-08-07 ENCOUNTER — TELEPHONE (OUTPATIENT)
Dept: ORTHOPEDIC SURGERY | Age: 79
End: 2019-08-07

## 2019-08-07 ENCOUNTER — OFFICE VISIT (OUTPATIENT)
Dept: ORTHOPEDIC SURGERY | Age: 79
End: 2019-08-07
Payer: MEDICARE

## 2019-08-07 VITALS — HEIGHT: 62 IN | BODY MASS INDEX: 28.52 KG/M2 | WEIGHT: 154.98 LBS

## 2019-08-07 DIAGNOSIS — M17.0 PRIMARY OSTEOARTHRITIS OF BOTH KNEES: ICD-10-CM

## 2019-08-07 DIAGNOSIS — M17.11 PRIMARY OSTEOARTHRITIS OF RIGHT KNEE: ICD-10-CM

## 2019-08-07 DIAGNOSIS — M17.12 PRIMARY OSTEOARTHRITIS OF LEFT KNEE: Primary | ICD-10-CM

## 2019-08-07 PROCEDURE — 20610 DRAIN/INJ JOINT/BURSA W/O US: CPT | Performed by: ORTHOPAEDIC SURGERY

## 2019-08-07 NOTE — PROGRESS NOTES
The patient returns today for their first Euflexxa injection to the left and right knees for diagnosis of osteoarthritis. . The risks, benefits, and complications of the injections were again discussed in detail with the patient. The risks discussed included but are not limited to infection, skin reactions, hot swollen joints, and anaphylaxis. The patient gave verbal informed consent for the injection. The patient skin was prepped with iodine and sterile 4x4 gauze pad and the left and right knee joints were injected with 2 ml of Euflexxa intra-articularly under sterile conditions  into the supra-lateral pouch. The patient tolerated the injection reasonably well. The patient was given instructions to ice the left and right knee and avoid strenuous activities for 24-48 hours. The patient was instructed to call the office immediately if there is increased pain, redness, warmth, fever, or chills. We will see the patient back in [one week] for their second injections.

## 2019-08-14 ENCOUNTER — OFFICE VISIT (OUTPATIENT)
Dept: ORTHOPEDIC SURGERY | Age: 79
End: 2019-08-14
Payer: MEDICARE

## 2019-08-14 DIAGNOSIS — M22.42 CHONDROMALACIA OF BOTH PATELLAE: ICD-10-CM

## 2019-08-14 DIAGNOSIS — M22.41 CHONDROMALACIA OF BOTH PATELLAE: ICD-10-CM

## 2019-08-14 DIAGNOSIS — M17.0 PRIMARY OSTEOARTHRITIS OF BOTH KNEES: Primary | ICD-10-CM

## 2019-08-14 PROCEDURE — 20610 DRAIN/INJ JOINT/BURSA W/O US: CPT | Performed by: FAMILY MEDICINE

## 2019-08-14 RX ORDER — HYALURONATE SODIUM 10 MG/ML
40 SYRINGE (ML) INTRAARTICULAR ONCE
Status: COMPLETED | OUTPATIENT
Start: 2019-08-14 | End: 2019-08-14

## 2019-08-14 RX ADMIN — Medication 40 MG: at 14:56

## 2019-08-14 NOTE — PROGRESS NOTES
The patient returns today for their second Euflexxa injection to the left and right knees for diagnosis of osteoarthritis. . The risks, benefits, and complications of the injections were again discussed in detail with the patient. The risks discussed included but are not limited to infection, skin reactions, hot swollen joints, and anaphylaxis. The patient gave verbal informed consent for the injection. The patient skin was prepped with iodine and sterile 4x4 gauze pad and the left and right knee joints were injected with 2 ml of Euflexxa intra-articularly under sterile conditions  into the supra-lateral pouch. The patient tolerated the injection reasonably well. The patient was given instructions to ice the left and right knee and avoid strenuous activities for 24-48 hours. The patient was instructed to call the office immediately if there is increased pain, redness, warmth, fever, or chills. We will see the patient back in [one week] for their third injections. We will continue with her meloxicam and home-based exercises.

## 2019-08-21 ENCOUNTER — OFFICE VISIT (OUTPATIENT)
Dept: ORTHOPEDIC SURGERY | Age: 79
End: 2019-08-21
Payer: MEDICARE

## 2019-08-21 VITALS — HEIGHT: 62 IN | BODY MASS INDEX: 28.52 KG/M2 | WEIGHT: 154.98 LBS

## 2019-08-21 DIAGNOSIS — M22.42 CHONDROMALACIA OF BOTH PATELLAE: ICD-10-CM

## 2019-08-21 DIAGNOSIS — M17.11 PRIMARY OSTEOARTHRITIS OF RIGHT KNEE: ICD-10-CM

## 2019-08-21 DIAGNOSIS — M17.12 PRIMARY OSTEOARTHRITIS OF LEFT KNEE: ICD-10-CM

## 2019-08-21 DIAGNOSIS — M22.41 CHONDROMALACIA OF BOTH PATELLAE: ICD-10-CM

## 2019-08-21 DIAGNOSIS — M17.0 PRIMARY OSTEOARTHRITIS OF BOTH KNEES: Primary | ICD-10-CM

## 2019-08-21 PROCEDURE — 20610 DRAIN/INJ JOINT/BURSA W/O US: CPT | Performed by: FAMILY MEDICINE

## 2019-08-21 RX ORDER — HYALURONATE SODIUM 10 MG/ML
40 SYRINGE (ML) INTRAARTICULAR ONCE
Status: COMPLETED | OUTPATIENT
Start: 2019-08-21 | End: 2019-08-21

## 2019-08-21 RX ADMIN — Medication 40 MG: at 08:49

## 2019-10-03 ENCOUNTER — HOSPITAL ENCOUNTER (OUTPATIENT)
Dept: WOMENS IMAGING | Age: 79
Discharge: HOME | End: 2019-10-03
Payer: MEDICARE

## 2019-10-03 DIAGNOSIS — Z12.31 VISIT FOR SCREENING MAMMOGRAM: ICD-10-CM

## 2019-10-03 PROCEDURE — 77063 BREAST TOMOSYNTHESIS BI: CPT

## 2019-10-03 PROCEDURE — 77067 SCR MAMMO BI INCL CAD: CPT

## 2019-10-07 ENCOUNTER — OFFICE VISIT (OUTPATIENT)
Dept: ORTHOPEDIC SURGERY | Age: 79
End: 2019-10-07
Payer: MEDICARE

## 2019-10-07 VITALS — WEIGHT: 155 LBS | HEIGHT: 62 IN | BODY MASS INDEX: 28.52 KG/M2 | RESPIRATION RATE: 18 BRPM

## 2019-10-07 DIAGNOSIS — M25.552 LEFT HIP PAIN: Primary | ICD-10-CM

## 2019-10-07 DIAGNOSIS — M70.62 TROCHANTERIC BURSITIS OF LEFT HIP: ICD-10-CM

## 2019-10-07 PROCEDURE — 99213 OFFICE O/P EST LOW 20 MIN: CPT | Performed by: PHYSICIAN ASSISTANT

## 2019-10-08 ENCOUNTER — HOSPITAL ENCOUNTER (OUTPATIENT)
Dept: WOMENS IMAGING | Age: 79
Discharge: HOME | End: 2019-10-08
Payer: MEDICARE

## 2019-10-08 ENCOUNTER — HOSPITAL ENCOUNTER (OUTPATIENT)
Dept: WOMENS IMAGING | Age: 79
End: 2019-10-08
Payer: MEDICARE

## 2019-10-08 DIAGNOSIS — R92.8 ABNORMAL MAMMOGRAM: ICD-10-CM

## 2019-10-08 PROCEDURE — G0279 TOMOSYNTHESIS, MAMMO: HCPCS

## 2019-10-14 ENCOUNTER — OFFICE VISIT (OUTPATIENT)
Dept: ORTHOPEDIC SURGERY | Age: 79
End: 2019-10-14
Payer: MEDICARE

## 2019-10-14 VITALS — WEIGHT: 154.98 LBS | BODY MASS INDEX: 28.52 KG/M2 | HEIGHT: 62 IN | RESPIRATION RATE: 17 BRPM

## 2019-10-14 DIAGNOSIS — M16.12 OSTEOARTHRITIS OF LEFT HIP, UNSPECIFIED OSTEOARTHRITIS TYPE: ICD-10-CM

## 2019-10-14 DIAGNOSIS — M25.552 LEFT HIP PAIN: Primary | ICD-10-CM

## 2019-10-14 DIAGNOSIS — M70.62 TROCHANTERIC BURSITIS OF LEFT HIP: ICD-10-CM

## 2019-10-14 DIAGNOSIS — M76.32 IT BAND SYNDROME, LEFT: ICD-10-CM

## 2019-10-14 PROCEDURE — 99214 OFFICE O/P EST MOD 30 MIN: CPT | Performed by: FAMILY MEDICINE

## 2019-10-14 PROCEDURE — 20610 DRAIN/INJ JOINT/BURSA W/O US: CPT | Performed by: FAMILY MEDICINE

## 2019-10-14 PROCEDURE — E0100 CANE ADJUST/FIXED WITH TIP: HCPCS | Performed by: FAMILY MEDICINE

## 2019-10-14 RX ORDER — BETAMETHASONE SODIUM PHOSPHATE AND BETAMETHASONE ACETATE 3; 3 MG/ML; MG/ML
12 INJECTION, SUSPENSION INTRA-ARTICULAR; INTRALESIONAL; INTRAMUSCULAR; SOFT TISSUE ONCE
Status: COMPLETED | OUTPATIENT
Start: 2019-10-14 | End: 2019-10-14

## 2019-10-14 RX ORDER — NABUMETONE 500 MG/1
500 TABLET, FILM COATED ORAL 2 TIMES DAILY
Qty: 60 TABLET | Refills: 3 | Status: ON HOLD | OUTPATIENT
Start: 2019-10-14 | End: 2022-09-23 | Stop reason: HOSPADM

## 2019-10-14 RX ADMIN — BETAMETHASONE SODIUM PHOSPHATE AND BETAMETHASONE ACETATE 12 MG: 3; 3 INJECTION, SUSPENSION INTRA-ARTICULAR; INTRALESIONAL; INTRAMUSCULAR; SOFT TISSUE at 14:58

## 2019-10-15 ENCOUNTER — HOSPITAL ENCOUNTER (OUTPATIENT)
Dept: PHYSICAL THERAPY | Age: 79
Setting detail: THERAPIES SERIES
Discharge: HOME | End: 2019-10-15
Payer: MEDICARE

## 2019-10-15 PROCEDURE — 97110 THERAPEUTIC EXERCISES: CPT | Performed by: PHYSICAL THERAPIST

## 2019-10-15 PROCEDURE — 97161 PT EVAL LOW COMPLEX 20 MIN: CPT | Performed by: PHYSICAL THERAPIST

## 2019-10-15 PROCEDURE — 97140 MANUAL THERAPY 1/> REGIONS: CPT | Performed by: PHYSICAL THERAPIST

## 2019-10-17 ENCOUNTER — HOSPITAL ENCOUNTER (OUTPATIENT)
Dept: PHYSICAL THERAPY | Age: 79
Setting detail: THERAPIES SERIES
Discharge: HOME | End: 2019-10-17
Payer: MEDICARE

## 2019-10-17 PROCEDURE — 97140 MANUAL THERAPY 1/> REGIONS: CPT | Performed by: PHYSICAL THERAPIST

## 2019-10-17 PROCEDURE — 97110 THERAPEUTIC EXERCISES: CPT | Performed by: PHYSICAL THERAPIST

## 2019-10-22 ENCOUNTER — HOSPITAL ENCOUNTER (OUTPATIENT)
Dept: PHYSICAL THERAPY | Age: 79
Setting detail: THERAPIES SERIES
Discharge: HOME | End: 2019-10-22
Payer: MEDICARE

## 2019-10-22 PROCEDURE — 97110 THERAPEUTIC EXERCISES: CPT | Performed by: PHYSICAL THERAPIST

## 2019-10-22 PROCEDURE — 97140 MANUAL THERAPY 1/> REGIONS: CPT | Performed by: PHYSICAL THERAPIST

## 2019-10-25 ENCOUNTER — HOSPITAL ENCOUNTER (OUTPATIENT)
Dept: PHYSICAL THERAPY | Age: 79
Setting detail: THERAPIES SERIES
Discharge: HOME | End: 2019-10-25
Payer: MEDICARE

## 2019-10-25 PROCEDURE — 97140 MANUAL THERAPY 1/> REGIONS: CPT | Performed by: PHYSICAL THERAPIST

## 2019-10-25 PROCEDURE — 97110 THERAPEUTIC EXERCISES: CPT | Performed by: PHYSICAL THERAPIST

## 2019-10-29 ENCOUNTER — HOSPITAL ENCOUNTER (OUTPATIENT)
Dept: PHYSICAL THERAPY | Age: 79
Setting detail: THERAPIES SERIES
Discharge: HOME | End: 2019-10-29
Payer: MEDICARE

## 2019-10-29 PROCEDURE — 97140 MANUAL THERAPY 1/> REGIONS: CPT | Performed by: PHYSICAL THERAPIST

## 2019-10-29 PROCEDURE — 97110 THERAPEUTIC EXERCISES: CPT | Performed by: PHYSICAL THERAPIST

## 2019-11-01 ENCOUNTER — HOSPITAL ENCOUNTER (OUTPATIENT)
Dept: PHYSICAL THERAPY | Age: 79
Setting detail: THERAPIES SERIES
Discharge: HOME | End: 2019-11-01
Payer: MEDICARE

## 2019-11-01 PROCEDURE — 97110 THERAPEUTIC EXERCISES: CPT | Performed by: PHYSICAL THERAPIST

## 2019-11-01 PROCEDURE — 97140 MANUAL THERAPY 1/> REGIONS: CPT | Performed by: PHYSICAL THERAPIST

## 2019-11-05 ENCOUNTER — HOSPITAL ENCOUNTER (OUTPATIENT)
Dept: PHYSICAL THERAPY | Age: 79
Setting detail: THERAPIES SERIES
Discharge: HOME | End: 2019-11-05
Payer: MEDICARE

## 2019-11-05 PROCEDURE — 97110 THERAPEUTIC EXERCISES: CPT | Performed by: PHYSICAL THERAPIST

## 2019-11-05 PROCEDURE — 97140 MANUAL THERAPY 1/> REGIONS: CPT | Performed by: PHYSICAL THERAPIST

## 2019-11-08 ENCOUNTER — HOSPITAL ENCOUNTER (OUTPATIENT)
Dept: PHYSICAL THERAPY | Age: 79
Setting detail: THERAPIES SERIES
Discharge: HOME | End: 2019-11-08
Payer: MEDICARE

## 2019-11-08 PROCEDURE — 97110 THERAPEUTIC EXERCISES: CPT | Performed by: PHYSICAL THERAPIST

## 2019-11-08 PROCEDURE — 97140 MANUAL THERAPY 1/> REGIONS: CPT | Performed by: PHYSICAL THERAPIST

## 2019-11-13 ENCOUNTER — HOSPITAL ENCOUNTER (OUTPATIENT)
Dept: PHYSICAL THERAPY | Age: 79
Setting detail: THERAPIES SERIES
Discharge: HOME | End: 2019-11-13
Payer: MEDICARE

## 2019-11-13 PROCEDURE — 97110 THERAPEUTIC EXERCISES: CPT | Performed by: PHYSICAL THERAPIST

## 2019-11-13 PROCEDURE — 97140 MANUAL THERAPY 1/> REGIONS: CPT | Performed by: PHYSICAL THERAPIST

## 2019-11-18 ENCOUNTER — HOSPITAL ENCOUNTER (OUTPATIENT)
Dept: PHYSICAL THERAPY | Age: 79
Setting detail: THERAPIES SERIES
Discharge: HOME | End: 2019-11-18
Payer: MEDICARE

## 2019-11-18 PROCEDURE — 97140 MANUAL THERAPY 1/> REGIONS: CPT | Performed by: PHYSICAL THERAPIST

## 2019-11-18 PROCEDURE — 97110 THERAPEUTIC EXERCISES: CPT | Performed by: PHYSICAL THERAPIST

## 2019-11-19 ENCOUNTER — APPOINTMENT (OUTPATIENT)
Dept: PHYSICAL THERAPY | Age: 79
End: 2019-11-19
Payer: MEDICARE

## 2019-11-22 ENCOUNTER — HOSPITAL ENCOUNTER (OUTPATIENT)
Dept: PHYSICAL THERAPY | Age: 79
Setting detail: THERAPIES SERIES
Discharge: HOME | End: 2019-11-22
Payer: MEDICARE

## 2019-11-22 PROCEDURE — 97110 THERAPEUTIC EXERCISES: CPT | Performed by: PHYSICAL THERAPIST

## 2019-11-22 PROCEDURE — 97140 MANUAL THERAPY 1/> REGIONS: CPT | Performed by: PHYSICAL THERAPIST

## 2019-11-25 ENCOUNTER — HOSPITAL ENCOUNTER (OUTPATIENT)
Dept: PHYSICAL THERAPY | Age: 79
Setting detail: THERAPIES SERIES
Discharge: HOME | End: 2019-11-25
Payer: MEDICARE

## 2019-11-25 PROCEDURE — 97140 MANUAL THERAPY 1/> REGIONS: CPT | Performed by: PHYSICAL THERAPIST

## 2019-11-25 PROCEDURE — 97110 THERAPEUTIC EXERCISES: CPT | Performed by: PHYSICAL THERAPIST

## 2019-12-03 ENCOUNTER — HOSPITAL ENCOUNTER (OUTPATIENT)
Dept: PHYSICAL THERAPY | Age: 79
Setting detail: THERAPIES SERIES
Discharge: HOME | End: 2019-12-03
Payer: MEDICARE

## 2019-12-03 PROCEDURE — 97110 THERAPEUTIC EXERCISES: CPT | Performed by: PHYSICAL THERAPIST

## 2019-12-03 PROCEDURE — 97140 MANUAL THERAPY 1/> REGIONS: CPT | Performed by: PHYSICAL THERAPIST

## 2019-12-06 ENCOUNTER — HOSPITAL ENCOUNTER (OUTPATIENT)
Dept: PHYSICAL THERAPY | Age: 79
Setting detail: THERAPIES SERIES
Discharge: HOME | End: 2019-12-06
Payer: MEDICARE

## 2019-12-06 PROCEDURE — 97140 MANUAL THERAPY 1/> REGIONS: CPT | Performed by: PHYSICAL THERAPIST

## 2019-12-06 PROCEDURE — 97110 THERAPEUTIC EXERCISES: CPT | Performed by: PHYSICAL THERAPIST

## 2019-12-09 ENCOUNTER — HOSPITAL ENCOUNTER (OUTPATIENT)
Dept: PHYSICAL THERAPY | Age: 79
Setting detail: THERAPIES SERIES
Discharge: HOME | End: 2019-12-09
Payer: MEDICARE

## 2019-12-09 PROCEDURE — 97110 THERAPEUTIC EXERCISES: CPT | Performed by: PHYSICAL THERAPIST

## 2019-12-09 PROCEDURE — 97140 MANUAL THERAPY 1/> REGIONS: CPT | Performed by: PHYSICAL THERAPIST

## 2019-12-13 ENCOUNTER — HOSPITAL ENCOUNTER (OUTPATIENT)
Dept: PHYSICAL THERAPY | Age: 79
Setting detail: THERAPIES SERIES
Discharge: HOME | End: 2019-12-13
Payer: MEDICARE

## 2019-12-19 ENCOUNTER — HOSPITAL ENCOUNTER (OUTPATIENT)
Dept: PHYSICAL THERAPY | Age: 79
Setting detail: THERAPIES SERIES
Discharge: HOME | End: 2019-12-19
Payer: MEDICARE

## 2019-12-19 PROCEDURE — 97110 THERAPEUTIC EXERCISES: CPT | Performed by: PHYSICAL THERAPIST

## 2019-12-19 PROCEDURE — 97140 MANUAL THERAPY 1/> REGIONS: CPT | Performed by: PHYSICAL THERAPIST

## 2019-12-24 ENCOUNTER — HOSPITAL ENCOUNTER (OUTPATIENT)
Dept: PHYSICAL THERAPY | Age: 79
Setting detail: THERAPIES SERIES
Discharge: HOME | End: 2019-12-24
Payer: MEDICARE

## 2019-12-24 PROCEDURE — 97110 THERAPEUTIC EXERCISES: CPT | Performed by: PHYSICAL THERAPIST

## 2019-12-24 PROCEDURE — 97140 MANUAL THERAPY 1/> REGIONS: CPT | Performed by: PHYSICAL THERAPIST

## 2019-12-31 ENCOUNTER — HOSPITAL ENCOUNTER (OUTPATIENT)
Dept: PHYSICAL THERAPY | Age: 79
Setting detail: THERAPIES SERIES
Discharge: HOME | End: 2019-12-31
Payer: MEDICARE

## 2019-12-31 PROCEDURE — 97140 MANUAL THERAPY 1/> REGIONS: CPT | Performed by: PHYSICAL THERAPIST

## 2019-12-31 PROCEDURE — 97110 THERAPEUTIC EXERCISES: CPT | Performed by: PHYSICAL THERAPIST

## 2019-12-31 NOTE — FLOWSHEET NOTE
Modified FIg 4 s 3x :20     Supine hip flex s 3x   :20        clamshells orange X 20    SLR 2 sets X 10    SL hip abd 2 sets X 10    LAQ/  Standing HS curl 2#  X 20 2# x 20    Seated HS s :30 3x      Incline s :30 4x    Lateral side stepping Orange. 2x         HR off step  X 20    CHLOE  TKE 30# X 20    CHLOE hip abd 30# X 20    LEG press 80#  x30     Manual Intervention (43205)               STM of ITB /  piriformis  4 min    Man HS s/  Man gastroc bilat 4 min each          NMR re-education (40448)   CUES NEEDED                                       Therapeutic Exercise and NMR EXR  [x] (93226) Provided verbal/tactile cueing for activities related to strengthening, flexibility, endurance, ROM for improvements in LE, proximal hip, and core control with self care, mobility, lifting, ambulation.  [] (42583) Provided verbal/tactile cueing for activities related to improving balance, coordination, kinesthetic sense, posture, motor skill, proprioception  to assist with LE, proximal hip, and core control in self care, mobility, lifting, ambulation and eccentric single leg control.      NMR and Therapeutic Activities:    [] (24151 or 38856) Provided verbal/tactile cueing for activities related to improving balance, coordination, kinesthetic sense, posture, motor skill, proprioception and motor activation to allow for proper function of core, proximal hip and LE with self care and ADLs  [] (15748) Gait Re-education- Provided training and instruction to the patient for proper LE, core and proximal hip recruitment and positioning and eccentric body weight control with ambulation re-education including up and down stairs     Home Exercise Program:    [x] (38905) Reviewed/Progressed HEP activities related to strengthening, flexibility, endurance, ROM of core, proximal hip and LE for functional self-care, mobility, lifting and ambulation/stair navigation   [] (68766)Reviewed/Progressed HEP activities related to improving balance, coordination, kinesthetic sense, posture, motor skill, proprioception of core, proximal hip and LE for self care, mobility, lifting, and ambulation/stair navigation      Manual Treatments:  PROM / STM / Oscillations-Mobs:  G-I, II, III, IV (PA's, Inf., Post.)  [x] (12587) Provided manual therapy to mobilize LE, proximal hip and/or LS spine soft tissue/joints for the purpose of modulating pain, promoting relaxation,  increasing ROM, reducing/eliminating soft tissue swelling/inflammation/restriction, improving soft tissue extensibility and allowing for proper ROM for normal function with self care, mobility, lifting and ambulation. Modalities:     [] ice x 15 min. Charges:  Timed Code Treatment Minutes: 45   Total Treatment Minutes: 45     [] EVAL (LOW) 13138 (typically 20 minutes face-to-face)  [] EVAL (MOD) 85822 (typically 30 minutes face-to-face)  [] EVAL (HIGH) 91697 (typically 45 minutes face-to-face)  [] RE-EVAL     [x] NS(60516) x  2  [] IONTO  [] NMR (65535) x     [] VASO  [x] Manual (26988) x 1     [] Other:ice  [] TA x      [] Mech Traction (75164)  [] ES(attended) (12762)      [] ES (un) (17485):     GOALS:   Patient stated goal: walking, carrying things, more energy. [x] Progressing: [] Met: [] Not Met: [] Adjusted    Therapist goals for Patient:   Short Term Goals: To be achieved in: 2 weeks  1. Independent in HEP and progression per patient tolerance, in order to prevent re-injury. [x] Progressing: [] Met: [] Not Met: [] Adjusted  2. Patient will have a decrease in pain to facilitate improvement in movement, function, and ADLs as indicated by Functional Deficits. [x] Progressing: [] Met: [] Not Met: [] Adjusted    Long Term Goals: To be achieved in: 6 weeks  1. Disability index score of 30% or less for the LEFS to assist with reaching prior level of function. [] Progressing: [x] Met: [] Not Met: [] Adjusted  2.  Patient will demonstrate increased AROM of hip to 110 flex, 45 abd, 45 ER and initiated [] Hold pending MD visit [x] Discharge      Electronically signed by:  Shayy Echols PT    Note: If patient does not return for scheduled/ recommended follow up visits, this note will serve as a discharge from care along with most recent update on progress.

## 2020-01-27 ENCOUNTER — TELEPHONE (OUTPATIENT)
Dept: ORTHOPEDIC SURGERY | Age: 80
End: 2020-01-27

## 2020-02-05 ENCOUNTER — TELEPHONE (OUTPATIENT)
Dept: ORTHOPEDIC SURGERY | Age: 80
End: 2020-02-05

## 2020-02-05 NOTE — TELEPHONE ENCOUNTER
I called and left a message for the patient to call to reschedule her appt as the provider will be out of town.

## 2020-02-21 ENCOUNTER — OFFICE VISIT (OUTPATIENT)
Dept: ORTHOPEDIC SURGERY | Age: 80
End: 2020-02-21
Payer: MEDICARE

## 2020-02-21 PROCEDURE — 20610 DRAIN/INJ JOINT/BURSA W/O US: CPT | Performed by: ORTHOPAEDIC SURGERY

## 2020-02-24 ENCOUNTER — OFFICE VISIT (OUTPATIENT)
Dept: ORTHOPEDIC SURGERY | Age: 80
End: 2020-02-24
Payer: MEDICARE

## 2020-02-24 PROCEDURE — 20610 DRAIN/INJ JOINT/BURSA W/O US: CPT | Performed by: ORTHOPAEDIC SURGERY

## 2020-03-03 ENCOUNTER — OFFICE VISIT (OUTPATIENT)
Dept: ORTHOPEDIC SURGERY | Age: 80
End: 2020-03-03
Payer: MEDICARE

## 2020-03-03 VITALS
SYSTOLIC BLOOD PRESSURE: 112 MMHG | WEIGHT: 154.98 LBS | DIASTOLIC BLOOD PRESSURE: 64 MMHG | BODY MASS INDEX: 28.52 KG/M2 | HEART RATE: 64 BPM | HEIGHT: 62 IN

## 2020-03-03 PROCEDURE — 99203 OFFICE O/P NEW LOW 30 MIN: CPT | Performed by: ORTHOPAEDIC SURGERY

## 2020-03-03 NOTE — PROGRESS NOTES
Patient Name: Javier Jackson  : 1940  DOS: 3/3/2020        Chief Complaint:   Chief Complaint   Patient presents with    Knee Pain     Bilateral knee       History of Present Illness:  Javier Jackson is a 70 y.o. female who presents with a chief complaint of continued complaints of pain in the knee with irritation with walking, stairs and with overuse. Pain in the knee regularly with swelling and discomfort. Increase in pain over the past several years time. Solved with gel injections and has had cortisone injections for which she described as bone-on-bone osteoarthritis. She is having greater difficulty over the last 2 years and getting up from a sitting position and increased difficulty with mobilization         Medical History  Current Medications:   Prior to Admission medications    Medication Sig Start Date End Date Taking? Authorizing Provider   Boswellia-Glucosamine-Vit D (OSTEO BI-FLEX ONE PER DAY PO) Take by mouth   Yes Historical Provider, MD   nabumetone (RELAFEN) 500 MG tablet Take 1 tablet by mouth 2 times daily 10/14/19   Jamari Pozo MD   fluticasone (FLONASE) 50 MCG/ACT nasal spray USE 2 SPRAYS NASALLY DAILY 17   Historical Provider, MD   mometasone-formoterol [facility]) 200-5 MCG/ACT inhaler Inhale 2 puffs into the lungs 3/2/17   Historical Provider, MD   NONFORMULARY AREDS eye vitamins    Historical Provider, MD   albuterol (PROVENTIL HFA) 108 (90 BASE) MCG/ACT inhaler Inhale 2 puffs into the lungs every 6 hours as needed for Wheezing. 10/16/14   Feliz Polo MD   amLODIPine (NORVASC) 10 MG tablet Take  by mouth daily. Verify dose from [facility] & [facility]    Historical Provider, MD   Loratadine (CLARITIN) 10 MG CAPS Take  by mouth. Clarify dose    Historical Provider, MD     Allergies:    Allergies   Allergen Reactions    Latex     Morphine And Related Anaphylaxis and Nausea And Vomiting    Percocet [Oxycodone-Acetaminophen] Shortness Of Breath     resp distress    Codeine      Other together: Not on file     Attends Holiness service: Not on file     Active member of club or organization: Not on file     Attends meetings of clubs or organizations: Not on file     Relationship status: Not on file    Intimate partner violence:     Fear of current or ex partner: Not on file     Emotionally abused: Not on file     Physically abused: Not on file     Forced sexual activity: Not on file   Other Topics Concern    Not on file   Social History Narrative    Not on file         Physical Exam __  Constitutional: She is oriented to person, place, and time and well-developed, well-nourished, and in no distress. No distress. ____  HENT:   Head: Normocephalic and atraumatic. ____  Eyes: Conjunctivae are normal. ________  Cardiovascular: Intact distal pulses. ____  Pulmonary/Chest: Effort normal. ________________________  Neurological: She is alert and oriented to person, place, and time. ____  Skin: Skin is dry. She is not diaphoretic. ____  Psychiatric: Mood, affect and judgment normal. ______          Assessment   Vital Signs:      Vitals:    03/03/20 1108   BP: 112/64   Pulse: 64       left Knee shows evidence for DJD with  obvious pseudolaxity, pain with weight bearing, antalgic gait and palpable osteophytes. Right knee demonstrates more linear alignment with less obvious pain. She states today is a relatively good day for her no obvious palpable osteophytes and no pseudolaxity    Inspection: Moderate anterior swelling. Swelling is present with mild left effusion. The posterior aspect of the knee on the left appears to be full with tenderness. There is no erythema, rash, or ecchymosis. Range of Motion:  Right 0-1 20 left 0-1 20     Pain with varus testing on the left testing    There is deformity mild to minimal varus alignment on the left    Strength:  Hamstrings rated: 4/5.  Quadriceps rated: 5/5  Hamstring tightness is noted greater on the right side than the left very little back pain obvious also discussed the benefits of low impact exercise and weight loss. We have also discussed the possibility of joint replacement surgery in the future. Plan for nutritional supplementation and controlled physical therapy. Appears to have little pain at this point. Left knee more limited area of osteoarthritis. Consider additional possible biologics.           Mikayla Alcaraz MD

## 2020-03-09 ENCOUNTER — OFFICE VISIT (OUTPATIENT)
Dept: ORTHOPEDIC SURGERY | Age: 80
End: 2020-03-09
Payer: MEDICARE

## 2020-03-09 VITALS — WEIGHT: 154.98 LBS | HEIGHT: 62 IN | BODY MASS INDEX: 28.52 KG/M2

## 2020-03-09 PROCEDURE — 20610 DRAIN/INJ JOINT/BURSA W/O US: CPT | Performed by: ORTHOPAEDIC SURGERY

## 2020-03-09 RX ORDER — HYALURONATE SODIUM 10 MG/ML
40 SYRINGE (ML) INTRAARTICULAR ONCE
Status: COMPLETED | OUTPATIENT
Start: 2020-03-09 | End: 2020-03-09

## 2020-03-09 RX ADMIN — Medication 40 MG: at 13:20

## 2020-03-16 ENCOUNTER — TELEPHONE (OUTPATIENT)
Dept: SURGERY | Age: 80
End: 2020-03-16

## 2020-03-20 NOTE — TELEPHONE ENCOUNTER
Will ask Dr. Troy Santizo to review pathology for opinion if waiting would be acceptable, due to COVID-19. 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

## 2020-05-05 ENCOUNTER — HOSPITAL ENCOUNTER (OUTPATIENT)
Dept: WOMENS IMAGING | Age: 80
Discharge: HOME | End: 2020-05-05
Payer: MEDICARE

## 2020-05-05 PROCEDURE — 76642 ULTRASOUND BREAST LIMITED: CPT

## 2020-05-05 PROCEDURE — G0279 TOMOSYNTHESIS, MAMMO: HCPCS

## 2020-07-01 ENCOUNTER — OFFICE VISIT (OUTPATIENT)
Dept: PRIMARY CARE CLINIC | Age: 80
End: 2020-07-01
Payer: MEDICARE

## 2020-07-01 PROCEDURE — 99211 OFF/OP EST MAY X REQ PHY/QHP: CPT | Performed by: NURSE PRACTITIONER

## 2020-07-01 NOTE — PROGRESS NOTES
Shannon Hui received a viral test for COVID-19. They were educated on isolation and quarantine as appropriate. For any symptoms, they were directed to seek care from their PCP, given contact information to establish with a doctor, directed to an urgent care or the emergency room. Patient is asymptomatic today.     [place] of Residence:[place]    Employer: [employer]

## 2020-07-04 LAB
SARS-COV-2: NOT DETECTED
SOURCE: NORMAL

## 2020-10-13 ENCOUNTER — TELEPHONE (OUTPATIENT)
Dept: ORTHOPEDIC SURGERY | Age: 80
End: 2020-10-13

## 2020-10-13 NOTE — TELEPHONE ENCOUNTER
10/13/2020    VISCO-3  (SERIES OF 3)    BILATERAL KNEES. CANCEL ORDER. PATIENT WILL HAVE TO F/U WITH ANOTHER PROVIDER. PER STEPHANIE BRANCH    NOTE: For next request they will need the outcome of the previous series. For [employer]:    [employer] WANTS TO KNOW THE FOLLOWING:    Has the patient had a documented reduction in the dose of NSAIDs, other anti-inflammatories, or other analgesics during the three month period following the previous injection series? Is there objective documentation to support significant improvement of functional capacity as a result of the previous injection series? Is there objective documentation to support significant improvement in pain as a result of the previous injection series    Will the drug be used in combination with anesthetics, coticosteriods, or platelet rich plasma,  Intra-articiular mannitol/sorbito, Mesenchymal stem cells, or another viscosupplement. Please indicate.

## 2020-10-21 ENCOUNTER — OFFICE VISIT (OUTPATIENT)
Dept: ORTHOPEDIC SURGERY | Age: 80
End: 2020-10-21
Payer: MEDICARE

## 2020-10-21 VITALS — HEIGHT: 62 IN | WEIGHT: 154 LBS | BODY MASS INDEX: 28.34 KG/M2

## 2020-10-21 PROCEDURE — 99214 OFFICE O/P EST MOD 30 MIN: CPT | Performed by: FAMILY MEDICINE

## 2020-10-21 RX ORDER — MELOXICAM 7.5 MG/1
7.5 TABLET ORAL DAILY
Qty: 30 TABLET | Refills: 3 | Status: ON HOLD | OUTPATIENT
Start: 2020-10-21 | End: 2022-09-23 | Stop reason: HOSPADM

## 2020-10-21 RX ORDER — MELOXICAM 15 MG/1
15 TABLET ORAL DAILY
Qty: 30 TABLET | Refills: 3 | Status: SHIPPED
Start: 2020-10-21 | End: 2020-10-21 | Stop reason: CLARIF

## 2020-10-21 NOTE — PROGRESS NOTES
Chief Complaint    Knee Pain (CK BILATERAL KNEES)    Follow-up worsening right knee pain with known history of left knee multicompartment osteoarthritis status post completion of bilateral knee Euflexxa by Dr. Peggy Oshea on 3/9/2020     History of Present Illness:  Suni Pinzon is a 68 y.o. female who is a very nice patient of Dr. Kelly Standing and longstanding patient of Dr. Korey Teixeira is being seen today for evaluation of recurrent right greater than left knee pain. Once again she is known to have fairly substantial bilateral knee patellofemoral arthropathy with medial compartment osteoarthritis left greater than right but is more symptomatic on the right-hand side. She will last received her rounds of viscosupplementation her knees bilaterally on 3/9/2020 and was doing reasonably well up until early October 2020 when she began to notice a gradual onset of pain to the anterior medial portion of her knees. There is no history of actual injury or new activity prior to becoming symptomatic. She does complain of an achy pain which is fairly minor at rest at 1-2 out of 10 but when she is up and walking particular doing lots of stairs and distance walking or working out in the yard, her pain can be quite prominent at 5-6 out of 10. She feels as if her Euflexxa is beginning to wear out. She has been a little bit lax in performing her home-based exercise program and was able to wean off of her meloxicam when the Euflexxa was working properly for her. Denies true locking catching or instability symptoms. She is being seen today for orthopedic and sports consultation with updated imaging. Medical History:  Patient's medications, allergies, past medical, surgical, social and family histories were reviewed and updated as appropriate.     Review of Systems:  Pertinent items are noted in HPI  Review of systems reviewed from Patient History Form dated on 10/21/2020 and available in the patient's chart under the test.  Screening hip testing appears to be fairly benign. Additional Examinations:         Right Lower Extremity: Examination of the right lower extremity does not show any tenderness, deformity or injury. Range of motion is unremarkable. There is no gross instability. There are no rashes, ulcerations or lesions. Strength and tone are normal.  Left Lower Extremity: Examination of the left lower extremity does not show any tenderness, deformity or injury. Range of motion is unremarkable. There is no gross instability. There are no rashes, ulcerations or lesions. Strength and tone are normal.    Radiology:     X-rays obtained and reviewed in office today:  Views standing AP, PA, lateral and sunrise  Location bilateral knee  Impression moderate advanced patellofemoral primary osteoarthritis with intermediate medial compartment narrowing left slightly worse than right         Assessment :  #1. Recurrent symptomatic moderate to significant medial compartment osteoarthritis with significant grade 4 patellofemoral arthropathy and recurrent synovitis status post completion of previous viscosupplementation with Euflexxa 3/9/2020 with recurrent pain     Impression:  Encounter Diagnoses   Name Primary?  Chronic pain of right knee Yes    Chronic pain of left knee     Primary osteoarthritis of both knees     Chondromalacia of both patellae        Office Procedures:  Orders Placed This Encounter   Procedures    XR KNEE RIGHT (MIN 4 VIEWS)     Standing Status:   Future     Number of Occurrences:   1     Standing Expiration Date:   10/21/2021     Order Specific Question:   Reason for exam:     Answer:   pain    XR KNEE LEFT (MIN 4 VIEWS)     Standing Status:   Future     Number of Occurrences:   1     Standing Expiration Date:   10/21/2021     Order Specific Question:   Reason for exam:     Answer:   pain       Treatment Plan:  Treatment options were discussed with Gunnar Gracia today.   We did review her bilateral knee weightbearing plain films as well as her previous MRI of her left knee and treatments thus far. Overall her symptoms were doing much better following her last round of viscosupplementation with Euflexxa to her knees bilaterally on 3/9/2020. Over the last several weeks she has noticed increasing pain and disability with pain going up to about a 5-6 out of 10 with distance walking and in particular with stair climbing. When the Euflexxa was effective she was able to cut back on her meloxicam 7.5 mg daily. She did have a definite improvement in her external capacity with pain only being about a 1-2 out of 10 during the summer. We will reinstitute her 7.5 mg daily and we did review her patellar protection program.  She may utilize her wraparound knee brace. She would like to avoid knee surgery if at all possible and I think it would be in her best medical interest to consider repeating her viscosupplementation bilaterally to her knees with Euflexxa. Icing and activity modification was discussed. We will see her back in a couple weeks for clinical recheck and possibly to repeat her viscosupplementation.

## 2020-10-23 ENCOUNTER — TELEPHONE (OUTPATIENT)
Dept: ORTHOPEDIC SURGERY | Age: 80
End: 2020-10-23

## 2020-10-23 NOTE — TELEPHONE ENCOUNTER
10/26/2020. VISCO 3 () series of 3. APPROVED # [id number].  10/23/2020-1/23/2021. BILATERAL KNEE.  VALID & BILLABLE ON CLAIM YES. BUY AND BILL. Per FAX  from [facility].

## 2020-10-26 ENCOUNTER — TELEPHONE (OUTPATIENT)
Dept: ORTHOPEDIC SURGERY | Age: 80
End: 2020-10-26

## 2020-10-26 NOTE — TELEPHONE ENCOUNTER
Spoke to patient and let them know that visco-3 injections have been approved for their bilateral knees. Scheduled patient for those injections.

## 2020-11-04 ENCOUNTER — OFFICE VISIT (OUTPATIENT)
Dept: ORTHOPEDIC SURGERY | Age: 80
End: 2020-11-04
Payer: MEDICARE

## 2020-11-04 VITALS — WEIGHT: 154.1 LBS | HEIGHT: 62 IN | BODY MASS INDEX: 28.36 KG/M2

## 2020-11-04 PROCEDURE — 99213 OFFICE O/P EST LOW 20 MIN: CPT | Performed by: FAMILY MEDICINE

## 2020-11-04 PROCEDURE — 20610 DRAIN/INJ JOINT/BURSA W/O US: CPT | Performed by: FAMILY MEDICINE

## 2020-11-04 NOTE — PROGRESS NOTES
Chief Complaint    Knee Pain (Bilateral Knee Pain)    Follow-up worsening right knee pain with known history of left knee multicompartment osteoarthritis status post completion of bilateral knee Euflexxa by Dr. Corina Whaley on 3/9/2020     History of Present Illness:  Gagan Carr is a 68 y.o. female who is a very nice patient of Dr. Tiny Carrington and longstanding patient of Dr. Nichols Sheets is being seen today for evaluation of recurrent right greater than left knee pain. Once again she is known to have fairly substantial bilateral knee patellofemoral arthropathy with medial compartment osteoarthritis left greater than right but is more symptomatic on the right-hand side. She will last received her rounds of viscosupplementation her knees bilaterally on 3/9/2020 and was doing reasonably well up until early 2020 when she began to notice a gradual onset of pain to the anterior medial portion of her knees. There is no history of actual injury or new activity prior to becoming symptomatic. She does complain of an achy pain which is fairly minor at rest at 1-2 out of 10 but when she is up and walking particular doing lots of stairs and distance walking or working out in the yard, her pain can be quite prominent at 5-6 out of 10. She feels as if her Euflexxa is beginning to wear out. She has been a little bit lax in performing her home-based exercise program and was able to wean off of her meloxicam when the Euflexxa was working properly for her. Denies true locking catching or instability symptoms. She is being seen today for orthopedic and sports consultation with updated imaging. Pain Assessment  Location of Pain: Knee  Location Modifiers: Left, Right  Severity of Pain: 3  Quality of Pain: Sharp, Dull, Aching  Duration of Pain: Persistent  Frequency of Pain: Constant  Aggravating Factors: Stairs, Standing, Walking, Squatting, Kneeling, Exercise, Straightening, Stretching, Bending  Limiting Behavior: Yes  Relieving Factors: Rest  Result of Injury: No  Work-Related Injury: No  Are there other pain locations you wish to document?: No     Yvon Yeager was last seen in the office on 10/21/2020 and was continued on conservative treatment for her bilateral knee osteoarthritis. She presents back today stating she is doing much better with more of an achiness at 2-3 out of 10. She denies locking or catching in her ability to walk while it is better is still somewhat difficult particularly she does walk distances attempt to squat and kneel and go up and down stairs. She continues to deny active locking or catching and is sleeping more comfortably. She has tolerated her meloxicam and is interested in once again in pursuing viscosupplementation which is helped her substantially. Her last round was completed on 3/9/2020 and she would like to avoid knee arthroplasty as long as she can. Medical History:  Patient's medications, allergies, past medical, surgical, social and family histories were reviewed and updated as appropriate. Review of Systems:  Pertinent items are noted in HPI  Review of systems reviewed from Patient History Form dated on 10/21/2020 and available in the patient's chart under the Media tab. Vital Signs:  Ht 5' 2.01\" (1.575 m)   Wt 154 lb 1.6 oz (69.9 kg)   BMI 28.18 kg/m²     General Exam:   Constitutional: Patient is adequately groomed with no evidence of malnutrition  Mental Status: The patient is oriented to time, place and person. The patient's mood and affect are appropriate. Lymphatic: The lymphatic examination bilaterally reveals all areas to be without enlargement or induration. Knee Examination:    Left Inspection: Does have a trace knee joint effusion without high-grade deformity knees bilaterally. She does have patellofemoral crepitation.     Palpation:  Left knee reveals good ligamentous stability in all directions with negative Lachman, drawer, pivot shift and posterior drawer. Good medial and lateral stability at 0 and 30°. She has more moderate tenderness peripatellar and retropatellar medial and lateral facet. Negative apprehension sign. Minimal medial or lateral joint line tenderness and negative Natasha sign. No masses in the popliteal space. Range of Motion:  0-130° left    Strength:  Quadriceps strength 3+ over 5 left    Special Tests:  Negative Homans sign left    Skin: There are no rashes, ulcerations or lesions. Gait: Patient ambulating without ambulatory aids    Reflex intact    Additional Comments:   Evaluation of her right knee does reveal only a trace knee joint effusion although she does have some patellofemoral and medial compartment crepitation. She does exhibit markedly improved medial joint line tenderness with a now negative Natasha's without appreciable click. Substantial lateral joint line tenderness with negative meniscal clicks. She does have reasonable range of motion with flexion to 125. I do not sense high-grade instability. She has fair Quad tone with strength being about 4-5 with flexion and extension. No evidence of obvious instability. She has a negative patellar grind test on the right with negative apprehension test.  Screening hip testing appears to be fairly benign. Additional Examinations:         Right Lower Extremity: Examination of the right lower extremity does not show any tenderness, deformity or injury. Range of motion is unremarkable. There is no gross instability. There are no rashes, ulcerations or lesions. Strength and tone are normal.  Left Lower Extremity: Examination of the left lower extremity does not show any tenderness, deformity or injury. Range of motion is unremarkable. There is no gross instability. There are no rashes, ulcerations or lesions.   Strength and tone are normal.    Radiology:     X-rays obtained and reviewed in office 10/21/2020  Views standing AP, PA, lateral and sunrise  Location bilateral knee  Impression moderate advanced patellofemoral primary osteoarthritis with intermediate medial compartment narrowing left slightly worse than right         Assessment :  #1. Recurrent symptomatic moderate to significant medial compartment osteoarthritis with significant grade 4 patellofemoral arthropathy and improved recurrent synovitis with bilateral knee Visco 3 #1     Impression:  Encounter Diagnoses   Name Primary?  Primary osteoarthritis of both knees Yes    Chondromalacia of both patellae     Chronic pain of right knee     Chronic pain of left knee        Office Procedures:  Orders Placed This Encounter   Procedures    OR ARTHROCENTESIS ASPIR&/INJ MAJOR JT/BURSA W/O US       Treatment Plan:  Treatment options were discussed with Terrence Galvan today. We did review her bilateral knee weightbearing plain films as well as her previous MRI of her left knee and treatments thus far. Overall her symptoms were doing much better following her last round of viscosupplementation with Euflexxa to her knees bilaterally on 3/9/2020. Over the last several weeks she has noticed increasing pain and disability with pain going up to about a 5-6 out of 10 with distance walking and in particular with stair climbing. Clinically at this point she is doing somewhat better since starting back on her meloxicam 7.5 mg daily. She was strong encouraged to continue with her patellar protection program.  She may utilize her wraparound knee brace. After discussion of pros and cons of Visco supplementation, she did receive her first injection of Visco 3 to her knees bilaterally. This was performed using the standard prefilled 2 cc syringe. She will be seen back each in the next 2 weeks to finish up her viscosupplementation series and she will contact us in the interim with questions or concerns.

## 2020-11-11 ENCOUNTER — OFFICE VISIT (OUTPATIENT)
Dept: ORTHOPEDIC SURGERY | Age: 80
End: 2020-11-11
Payer: MEDICARE

## 2020-11-11 VITALS — WEIGHT: 154 LBS | BODY MASS INDEX: 28.34 KG/M2 | HEIGHT: 62 IN

## 2020-11-11 PROCEDURE — 20610 DRAIN/INJ JOINT/BURSA W/O US: CPT | Performed by: FAMILY MEDICINE

## 2020-11-11 NOTE — PROGRESS NOTES
CC:  FU Knee Osteoarthritis with Viscosupplementation        Follow-up worsening right knee pain with known history of left knee multicompartment osteoarthritis status post completion of bilateral knee Euflexxa by Dr. Jess Newberry on 3/9/2020     History of Present Illness:  Alexandra Vyas is a 68 y.o. female who is a very nice patient of Dr. Reed Tristan and longstanding patient of Dr. Lev Agustin is being seen today for evaluation of recurrent right greater than left knee pain. Once again she is known to have fairly substantial bilateral knee patellofemoral arthropathy with medial compartment osteoarthritis left greater than right but is more symptomatic on the right-hand side. She will last received her rounds of viscosupplementation her knees bilaterally on 3/9/2020 and was doing reasonably well up until early October 2020 when she began to notice a gradual onset of pain to the anterior medial portion of her knees. There is no history of actual injury or new activity prior to becoming symptomatic. She does complain of an achy pain which is fairly minor at rest at 1-2 out of 10 but when she is up and walking particular doing lots of stairs and distance walking or working out in the yard, her pain can be quite prominent at 5-6 out of 10. She feels as if her Euflexxa is beginning to wear out. She has been a little bit lax in performing her home-based exercise program and was able to wean off of her meloxicam when the Euflexxa was working properly for her. Denies true locking catching or instability symptoms. She is being seen today for orthopedic and sports consultation with updated imaging. Pain Assessment    [place] was last seen in the office on 10/21/2020 and was continued on conservative treatment for her bilateral knee osteoarthritis. She presents back today stating she is doing much better with more of an achiness at 2-3 out of 10.   She denies locking or catching in her ability to walk while it is better is still somewhat difficult particularly she does walk distances attempt to squat and kneel and go up and down stairs. She continues to deny active locking or catching and is sleeping more comfortably. She has tolerated her meloxicam and is interested in once again in pursuing viscosupplementation which is helped her substantially. Her last round was completed on 3/9/2020 and she would like to avoid knee arthroplasty as long as she can. Salvador Horan was last seen in the office on 11/4/2020 and is in the process of continuing with her bilateral knee Visco 3 injections. She is very pleased with her progress and is doing much better with pain only being about a 1-2 out of 10. Walking distances and climbing stairs has improved and he denies locking catching or night pain. She has been working her home-based exercise program and has tolerated her meloxicam.  She is here today for her second Visco 3 injection bilaterally. PE: no substantial change in exam.    Assessment:  Knee Osteoarthritis with viscosupplementation      PROCEDURE NOTE:    PRE-PROCEDURE DIAGNOSIS: DJD knee    POST-PROCEDURE DIAGNOSIS: DJD knee    Injection #2 of Visco 3 injections bilaterally. PROCEDURE:  With the patient's permission, her bilaterally knee was prepped in standard sterile fashion with Betadine and Alcohol and the prefilled 2cc injection of Visco 3 was injected intra-articularly into the bilaterally knee via a superolateral approach without difficulty. The patient tolerated this well without difficulty. A band-aid was applied. POST-PROCEDURE INSTRUCTIONS GIVEN TO PATIENT: The patient was advised to ice the knee for 15-20 minutes to relieve any injection site related pain. FOLLOW-UP: as directed. We will continue with her meloxicam 15 mg daily as well as her home-based exercise program and periodic use of her knee brace. She will be seen back next week to finish up her Visco 3 injections.   She had an excellent response to previous viscosupplementation in the past and would like to avoid knee arthroplasty as long she can.

## 2020-11-23 ENCOUNTER — OFFICE VISIT (OUTPATIENT)
Dept: ORTHOPEDIC SURGERY | Age: 80
End: 2020-11-23
Payer: MEDICARE

## 2020-11-23 VITALS — HEIGHT: 62 IN | BODY MASS INDEX: 28.34 KG/M2 | WEIGHT: 154 LBS

## 2020-11-23 PROCEDURE — 20610 DRAIN/INJ JOINT/BURSA W/O US: CPT | Performed by: FAMILY MEDICINE

## 2020-11-23 NOTE — PROGRESS NOTES
CC:  FU Knee Osteoarthritis with Viscosupplementation        Follow-up worsening right knee pain with known history of left knee multicompartment osteoarthritis status post completion of bilateral knee Euflexxa by Dr. Nancy Barron on 3/9/2020     History of Present Illness:  Lashell Corbin is a 68 y.o. female who is a very nice patient of Dr. Clarissa Lemons and longstanding patient of Dr. Ivette Ruelas is being seen today for evaluation of recurrent right greater than left knee pain. Once again she is known to have fairly substantial bilateral knee patellofemoral arthropathy with medial compartment osteoarthritis left greater than right but is more symptomatic on the right-hand side. She will last received her rounds of viscosupplementation her knees bilaterally on 3/9/2020 and was doing reasonably well up until early October 2020 when she began to notice a gradual onset of pain to the anterior medial portion of her knees. There is no history of actual injury or new activity prior to becoming symptomatic. She does complain of an achy pain which is fairly minor at rest at 1-2 out of 10 but when she is up and walking particular doing lots of stairs and distance walking or working out in the yard, her pain can be quite prominent at 5-6 out of 10. She feels as if her Euflexxa is beginning to wear out. She has been a little bit lax in performing her home-based exercise program and was able to wean off of her meloxicam when the Euflexxa was working properly for her. Denies true locking catching or instability symptoms. She is being seen today for orthopedic and sports consultation with updated imaging. Pain Assessment    Luigi Rios was last seen in the office on 10/21/2020 and was continued on conservative treatment for her bilateral knee osteoarthritis. She presents back today stating she is doing much better with more of an achiness at 2-3 out of 10.   She denies locking or catching in her ability to walk while it is better 2cc injection of Visco 3 was injected intra-articularly into the bilaterally knee via a superolateral approach without difficulty. The patient tolerated this well without difficulty. A band-aid was applied. POST-PROCEDURE INSTRUCTIONS GIVEN TO PATIENT: The patient was advised to ice the knee for 15-20 minutes to relieve any injection site related pain. FOLLOW-UP: as directed. We will continue with her meloxicam 15 mg daily as well as her home-based exercise program and periodic use of her knee brace. She is aware that she can have repeat viscosupplementation at 6-month intervals or even an interim steroid injection. Her last round of viscosupplementation did give her about 7 to 8 months of reasonable relief and she would like to avoid arthroplasty as long she can.

## 2021-04-19 DIAGNOSIS — G89.29 CHRONIC PAIN OF RIGHT KNEE: ICD-10-CM

## 2021-04-19 DIAGNOSIS — M22.41 CHONDROMALACIA OF BOTH PATELLAE: ICD-10-CM

## 2021-04-19 DIAGNOSIS — M25.562 CHRONIC PAIN OF LEFT KNEE: ICD-10-CM

## 2021-04-19 DIAGNOSIS — M25.561 CHRONIC PAIN OF RIGHT KNEE: ICD-10-CM

## 2021-04-19 DIAGNOSIS — G89.29 CHRONIC PAIN OF LEFT KNEE: ICD-10-CM

## 2021-04-19 DIAGNOSIS — M22.42 CHONDROMALACIA OF BOTH PATELLAE: ICD-10-CM

## 2021-04-19 DIAGNOSIS — M17.0 PRIMARY OSTEOARTHRITIS OF BOTH KNEES: Primary | ICD-10-CM

## 2021-04-26 ENCOUNTER — TELEPHONE (OUTPATIENT)
Dept: ORTHOPEDIC SURGERY | Age: 81
End: 2021-04-26

## 2021-04-26 NOTE — TELEPHONE ENCOUNTER
04/26/2021    VISCO-3  (SERIES OF 3)  BILATERAL KNEES. APPROVED FOR BUY & BILL. # [id number]. DATES:    05/23/2021 - 08/23/2021. PER FAX FROM [employer]. AP.    NOTE: Original request was for non-preferred drug EUFLEXX. Okay to switch to preferred drug  VISCO-3 Per Susan Rockwell  .  AP

## 2021-05-07 ENCOUNTER — TELEPHONE (OUTPATIENT)
Dept: ORTHOPEDIC SURGERY | Age: 81
End: 2021-05-07

## 2021-05-07 NOTE — TELEPHONE ENCOUNTER
LVM FOR PATIENT THAT VISCO-3 WAS AUTHORIZED WITH INSURANCE FOR BILATERAL KNEES AND SHE IS ELIGIBLE 5/24/21 OR AFTER. TOLD HER TO CALL ME BACK TO SCHEDULE INJECTIONS IF INTERESTED. ALSO LET HER KNOW THE AUTHORIZATION IS GOOD THROUGH AUGUST 23, 2021.

## 2021-06-01 ENCOUNTER — HOSPITAL ENCOUNTER (OUTPATIENT)
Dept: WOMENS IMAGING | Age: 81
Discharge: HOME | End: 2021-06-01
Payer: MEDICARE

## 2021-06-01 DIAGNOSIS — Z12.39 SCREENING BREAST EXAMINATION: ICD-10-CM

## 2021-06-01 PROCEDURE — 77067 SCR MAMMO BI INCL CAD: CPT

## 2021-06-28 ENCOUNTER — OFFICE VISIT (OUTPATIENT)
Dept: ORTHOPEDIC SURGERY | Age: 81
End: 2021-06-28
Payer: MEDICARE

## 2021-06-28 DIAGNOSIS — G89.29 CHRONIC PAIN OF LEFT KNEE: ICD-10-CM

## 2021-06-28 DIAGNOSIS — M22.42 CHONDROMALACIA OF BOTH PATELLAE: ICD-10-CM

## 2021-06-28 DIAGNOSIS — M22.41 CHONDROMALACIA OF BOTH PATELLAE: ICD-10-CM

## 2021-06-28 DIAGNOSIS — G89.29 CHRONIC PAIN OF RIGHT KNEE: ICD-10-CM

## 2021-06-28 DIAGNOSIS — M17.0 PRIMARY OSTEOARTHRITIS OF BOTH KNEES: Primary | ICD-10-CM

## 2021-06-28 DIAGNOSIS — M25.562 CHRONIC PAIN OF LEFT KNEE: ICD-10-CM

## 2021-06-28 DIAGNOSIS — M25.561 CHRONIC PAIN OF RIGHT KNEE: ICD-10-CM

## 2021-06-28 PROCEDURE — 99213 OFFICE O/P EST LOW 20 MIN: CPT | Performed by: FAMILY MEDICINE

## 2021-06-28 PROCEDURE — 20610 DRAIN/INJ JOINT/BURSA W/O US: CPT | Performed by: FAMILY MEDICINE

## 2021-06-28 NOTE — PROGRESS NOTES
Chief Complaint    No chief complaint on file. Follow-up worsening right knee pain with known history of left knee multicompartment osteoarthritis status post completion of bilateral knee Euflexxa 11/23/2020    History of Present Illness:  Jorge Houston is a 68 y.o. female who is a very nice patient of Dr. Shahzad Chapa and longstanding patient of Dr. Paxton Galarza is being seen today for evaluation of recurrent right greater than left knee pain. Once again she is known to have fairly substantial bilateral knee patellofemoral arthropathy with medial compartment osteoarthritis left greater than right but is more symptomatic on the right-hand side. She will last received her rounds of viscosupplementation her knees bilaterally on 3/9/2020 and was doing reasonably well up until early October 2020 when she began to notice a gradual onset of pain to the anterior medial portion of her knees. There is no history of actual injury or new activity prior to becoming symptomatic. She does complain of an achy pain which is fairly minor at rest at 1-2 out of 10 but when she is up and walking particular doing lots of stairs and distance walking or working out in the yard, her pain can be quite prominent at 5-6 out of 10. She feels as if her Euflexxa is beginning to wear out. She has been a little bit lax in performing her home-based exercise program and was able to wean off of her meloxicam when the Euflexxa was working properly for her. Denies true locking catching or instability symptoms. She is being seen today for orthopedic and sports consultation with updated imaging. Charla Perkins was last seen in the office on 10/21/2020 and was continued on conservative treatment for her bilateral knee osteoarthritis. She presents back today stating she is doing much better with more of an achiness at 2-3 out of 10.   She denies locking or catching in her ability to walk while it is better is still somewhat difficult particularly she does walk distances attempt to squat and kneel and go up and down stairs. She continues to deny active locking or catching and is sleeping more comfortably. She has tolerated her meloxicam and is interested in once again in pursuing viscosupplementation which is helped her substantially. Her last round was completed on 3/9/2020 and she would like to avoid knee arthroplasty as long as she can. We last already in the office on 11/23/2020 and is being seen today for reevaluation of her bilateral knee osteoarthritis with patellofemoral arthropathy. Recently she has noted some increasing achiness but only about a 1-2 out of 10 but like to pursue viscosupplementation once again. She does continue to walk and only takes her Aleve on a very episodic basis. She has gotten substantial benefit from viscosupplementation in the past and does work on her home-based exercises. She denies substantial rest or night pain. She will have some soreness anteriorly if she does lots of stairs but for the most part is tolerating this. She would like to put off knee arthroplasty as long as she can but would like to pursue viscosupplementation once again. Medical History:  Patient's medications, allergies, past medical, surgical, social and family histories were reviewed and updated as appropriate. Review of Systems:  Pertinent items are noted in HPI  Review of systems reviewed from Patient History Form dated on 10/21/2020 and available in the patient's chart under the Media tab. Vital Signs: There were no vitals taken for this visit. General Exam:   Constitutional: Patient is adequately groomed with no evidence of malnutrition  Mental Status: The patient is oriented to time, place and person. The patient's mood and affect are appropriate. Lymphatic: The lymphatic examination bilaterally reveals all areas to be without enlargement or induration.     Knee Examination:    Left Inspection: Does have a trace knee cc syringe. She will be seen back each in the next 2 weeks to finish up her viscosupplementation series and she will contact us in the interim with questions or concerns.

## 2021-07-07 ENCOUNTER — OFFICE VISIT (OUTPATIENT)
Dept: ORTHOPEDIC SURGERY | Age: 81
End: 2021-07-07
Payer: MEDICARE

## 2021-07-07 DIAGNOSIS — M25.561 CHRONIC PAIN OF RIGHT KNEE: ICD-10-CM

## 2021-07-07 DIAGNOSIS — G89.29 CHRONIC PAIN OF RIGHT KNEE: ICD-10-CM

## 2021-07-07 DIAGNOSIS — M22.42 CHONDROMALACIA OF BOTH PATELLAE: ICD-10-CM

## 2021-07-07 DIAGNOSIS — M25.562 CHRONIC PAIN OF LEFT KNEE: ICD-10-CM

## 2021-07-07 DIAGNOSIS — M22.41 CHONDROMALACIA OF BOTH PATELLAE: ICD-10-CM

## 2021-07-07 DIAGNOSIS — M25.561 ACUTE PAIN OF RIGHT KNEE: ICD-10-CM

## 2021-07-07 DIAGNOSIS — G89.29 CHRONIC PAIN OF LEFT KNEE: ICD-10-CM

## 2021-07-07 DIAGNOSIS — M17.0 PRIMARY OSTEOARTHRITIS OF BOTH KNEES: Primary | ICD-10-CM

## 2021-07-07 PROCEDURE — 20610 DRAIN/INJ JOINT/BURSA W/O US: CPT | Performed by: FAMILY MEDICINE

## 2021-07-07 NOTE — PROGRESS NOTES
CC:  FU Knee Osteoarthritis with Viscosupplementation        Follow-up worsening right knee pain with known history of left knee multicompartment osteoarthritis status post completion of bilateral knee Euflexxa by Dr. Rosalinda Davidson on 3/9/2020     History of Present Illness:  Stefani Sam is a 68 y.o. female who is a very nice patient of Dr. Gabbie De León and longstanding patient of Dr. Channing Medellin is being seen today for evaluation of recurrent right greater than left knee pain. Once again she is known to have fairly substantial bilateral knee patellofemoral arthropathy with medial compartment osteoarthritis left greater than right but is more symptomatic on the right-hand side. We last already in the office on 11/23/2020 and is being seen today for reevaluation of her bilateral knee osteoarthritis with patellofemoral arthropathy. Recently she has noted some increasing achiness but only about a 1-2 out of 10 but like to pursue viscosupplementation once again. She does continue to walk and only takes her Aleve on a very episodic basis. She has gotten substantial benefit from viscosupplementation in the past and does work on her home-based exercises. She denies substantial rest or night pain. She will have some soreness anteriorly if she does lots of stairs but for the most part is tolerating this. She would like to put off knee arthroplasty as long as she can but would like to pursue viscosupplementation once again. Silvana Ibarra was last seen in the office on 6/20/2021 and was started on Visco 3 injections to her knees once again. She is here today for second flex injection and states her knee is doing reasonably well. She is continue to walk and most her pain is only 1-2 out of 10 and she is working on her home-based exercise program and only takes Aleve at a very episodic basis. Denies locking catching or true instability symptoms. She would like to put off knee arthroplasty as long as she can.     PE: no substantial change in exam.    Assessment:  Knee Osteoarthritis with viscosupplementation      PROCEDURE NOTE:    PRE-PROCEDURE DIAGNOSIS: DJD knee    POST-PROCEDURE DIAGNOSIS: DJD knee    Injection #2 of Visco 3 injections bilaterally. PROCEDURE:  With the patient's permission, her bilaterally knee was prepped in standard sterile fashion with Betadine and Alcohol and the prefilled 2cc injection of Visco 3 was injected intra-articularly into the bilaterally knee via a superolateral approach without difficulty. The patient tolerated this well without difficulty. A band-aid was applied. POST-PROCEDURE INSTRUCTIONS GIVEN TO PATIENT: The patient was advised to ice the knee for 15-20 minutes to relieve any injection site related pain. FOLLOW-UP: as directed. We will continue with her meloxicam 15 mg daily as well as her home-based exercise program and periodic use of her knee brace. She will be seen back next week to finish up her Visco 3 injections. She had an excellent response to previous viscosupplementation in the past and would like to avoid knee arthroplasty as long she can.

## 2021-07-12 ENCOUNTER — OFFICE VISIT (OUTPATIENT)
Dept: ORTHOPEDIC SURGERY | Age: 81
End: 2021-07-12
Payer: MEDICARE

## 2021-07-12 VITALS — BODY MASS INDEX: 28.34 KG/M2 | HEIGHT: 62 IN | WEIGHT: 154 LBS

## 2021-07-12 DIAGNOSIS — M17.0 PRIMARY OSTEOARTHRITIS OF BOTH KNEES: Primary | ICD-10-CM

## 2021-07-12 DIAGNOSIS — M22.42 CHONDROMALACIA OF BOTH PATELLAE: ICD-10-CM

## 2021-07-12 DIAGNOSIS — G89.29 CHRONIC PAIN OF LEFT KNEE: ICD-10-CM

## 2021-07-12 DIAGNOSIS — M22.41 CHONDROMALACIA OF BOTH PATELLAE: ICD-10-CM

## 2021-07-12 DIAGNOSIS — M25.561 ACUTE PAIN OF RIGHT KNEE: ICD-10-CM

## 2021-07-12 DIAGNOSIS — M25.562 CHRONIC PAIN OF LEFT KNEE: ICD-10-CM

## 2021-07-12 DIAGNOSIS — M25.561 CHRONIC PAIN OF RIGHT KNEE: ICD-10-CM

## 2021-07-12 DIAGNOSIS — G89.29 CHRONIC PAIN OF RIGHT KNEE: ICD-10-CM

## 2021-07-12 PROCEDURE — 20610 DRAIN/INJ JOINT/BURSA W/O US: CPT | Performed by: FAMILY MEDICINE

## 2021-07-12 NOTE — PROGRESS NOTES
CC:  FU Knee Osteoarthritis with Viscosupplementation        Follow-up worsening right knee pain with known history of left knee multicompartment osteoarthritis status post completion of bilateral knee Euflexxa by Dr. David Justice on 3/9/2020     History of Present Illness:  Cadence Kirkpatrick is a 68 y.o. female who is a very nice patient of Dr. Alexandra Duran and longstanding patient of Dr. Oscar Cohen is being seen today for evaluation of recurrent right greater than left knee pain. Once again she is known to have fairly substantial bilateral knee patellofemoral arthropathy with medial compartment osteoarthritis left greater than right but is more symptomatic on the right-hand side. We last already in the office on 11/23/2020 and is being seen today for reevaluation of her bilateral knee osteoarthritis with patellofemoral arthropathy. Recently she has noted some increasing achiness but only about a 1-2 out of 10 but like to pursue viscosupplementation once again. She does continue to walk and only takes her Aleve on a very episodic basis. She has gotten substantial benefit from viscosupplementation in the past and does work on her home-based exercises. She denies substantial rest or night pain. She will have some soreness anteriorly if she does lots of stairs but for the most part is tolerating this. She would like to put off knee arthroplasty as long as she can but would like to pursue viscosupplementation once again. Mitchell Ponce was last seen in the office on 6/20/2021 and was started on Visco 3 injections to her knees once again. She is here today for second flex injection and states her knee is doing reasonably well. She is continue to walk and most her pain is only 1-2 out of 10 and she is working on her home-based exercise program and only takes Aleve at a very episodic basis. Denies locking catching or true instability symptoms. She would like to put off knee arthroplasty as long as she can.     Was last seen in the office on 7/7/2021 and was continued on her Visco 3 injections to her knees. She is here today for final Visco 3 injection and states that her knees are doing outstanding. She continues to walk on a near daily basis with only minimal pain at 0-1 out of 10. She does work on her home exercise program and only takes Aleve episodically. Denies locking catching or true instability and would like to put off knee arthroplasty as long as she can. PE: no substantial change in exam.    Assessment:  Knee Osteoarthritis with viscosupplementation      PROCEDURE NOTE:    PRE-PROCEDURE DIAGNOSIS: DJD knee    POST-PROCEDURE DIAGNOSIS: DJD knee    Injection #3 of Visco 3 injections bilaterally. PROCEDURE:  With the patient's permission, her bilaterally knee was prepped in standard sterile fashion with Betadine and Alcohol and the prefilled 2cc injection of Visco 3 was injected intra-articularly into the bilaterally knee via a superolateral approach without difficulty. The patient tolerated this well without difficulty. A band-aid was applied. POST-PROCEDURE INSTRUCTIONS GIVEN TO PATIENT: The patient was advised to ice the knee for 15-20 minutes to relieve any injection site related pain. FOLLOW-UP: as directed. We will continue with her meloxicam 15 mg daily as well as her home-based exercise program and periodic use of her knee brace. .  She had an excellent response to previous viscosupplementation in the past and knows that she can do these at 6-month intervals if necessary and would like to avoid knee arthroplasty as long she can.

## 2021-10-10 ENCOUNTER — APPOINTMENT (OUTPATIENT)
Dept: CT IMAGING | Age: 81
End: 2021-10-10
Payer: MEDICARE

## 2021-10-10 ENCOUNTER — HOSPITAL ENCOUNTER (EMERGENCY)
Age: 81
Discharge: HOME | End: 2021-10-10
Attending: EMERGENCY MEDICINE
Payer: MEDICARE

## 2021-10-10 VITALS
TEMPERATURE: 98 F | RESPIRATION RATE: 14 BRPM | HEART RATE: 62 BPM | SYSTOLIC BLOOD PRESSURE: 136 MMHG | DIASTOLIC BLOOD PRESSURE: 69 MMHG | BODY MASS INDEX: 29.64 KG/M2 | WEIGHT: 157 LBS | OXYGEN SATURATION: 95 % | HEIGHT: 61 IN

## 2021-10-10 DIAGNOSIS — R10.31 RIGHT LOWER QUADRANT ABDOMINAL PAIN: Primary | ICD-10-CM

## 2021-10-10 DIAGNOSIS — R10.9 ACUTE RIGHT FLANK PAIN: ICD-10-CM

## 2021-10-10 LAB
A/G RATIO: 1.4 (ref 1.1–2.2)
ALBUMIN SERPL-MCNC: 4.1 G/DL (ref 3.4–5)
ALP BLD-CCNC: 82 U/L (ref 40–129)
ALT SERPL-CCNC: 14 U/L (ref 10–40)
ANION GAP SERPL CALCULATED.3IONS-SCNC: 8 MMOL/L (ref 3–16)
AST SERPL-CCNC: 17 U/L (ref 15–37)
BASOPHILS ABSOLUTE: 0 K/UL (ref 0–0.2)
BASOPHILS RELATIVE PERCENT: 0.6 %
BILIRUB SERPL-MCNC: 0.3 MG/DL (ref 0–1)
BILIRUBIN URINE: NEGATIVE
BLOOD, URINE: ABNORMAL
BUN BLDV-MCNC: 22 MG/DL (ref 7–20)
CALCIUM SERPL-MCNC: 9.2 MG/DL (ref 8.3–10.6)
CHLORIDE BLD-SCNC: 105 MMOL/L (ref 99–110)
CLARITY: CLEAR
CO2: 24 MMOL/L (ref 21–32)
COLOR: YELLOW
CREAT SERPL-MCNC: 0.7 MG/DL (ref 0.6–1.2)
EOSINOPHILS ABSOLUTE: 0.1 K/UL (ref 0–0.6)
EOSINOPHILS RELATIVE PERCENT: 1.1 %
EPITHELIAL CELLS, UA: NORMAL /HPF (ref 0–5)
GFR AFRICAN AMERICAN: >60
GFR NON-AFRICAN AMERICAN: >60
GLOBULIN: 3 G/DL
GLUCOSE BLD-MCNC: 117 MG/DL (ref 70–99)
GLUCOSE URINE: NEGATIVE MG/DL
HCT VFR BLD CALC: 39.7 % (ref 36–48)
HEMOGLOBIN: 13.5 G/DL (ref 12–16)
KETONES, URINE: NEGATIVE MG/DL
LEUKOCYTE ESTERASE, URINE: ABNORMAL
LIPASE: 18 U/L (ref 13–60)
LYMPHOCYTES ABSOLUTE: 1.7 K/UL (ref 1–5.1)
LYMPHOCYTES RELATIVE PERCENT: 24.8 %
MCH RBC QN AUTO: 29.5 PG (ref 26–34)
MCHC RBC AUTO-ENTMCNC: 34 G/DL (ref 31–36)
MCV RBC AUTO: 86.6 FL (ref 80–100)
MICROSCOPIC EXAMINATION: YES
MONOCYTES ABSOLUTE: 0.6 K/UL (ref 0–1.3)
MONOCYTES RELATIVE PERCENT: 9.3 %
NEUTROPHILS ABSOLUTE: 4.4 K/UL (ref 1.7–7.7)
NEUTROPHILS RELATIVE PERCENT: 64.2 %
NITRITE, URINE: NEGATIVE
PDW BLD-RTO: 14.8 % (ref 12.4–15.4)
PH UA: 6 (ref 5–8)
PLATELET # BLD: 238 K/UL (ref 135–450)
PMV BLD AUTO: 6.8 FL (ref 5–10.5)
POTASSIUM SERPL-SCNC: 4 MMOL/L (ref 3.5–5.1)
PROTEIN UA: NEGATIVE MG/DL
RBC # BLD: 4.59 M/UL (ref 4–5.2)
RBC UA: NORMAL /HPF (ref 0–4)
SODIUM BLD-SCNC: 137 MMOL/L (ref 136–145)
SPECIFIC GRAVITY UA: 1.01 (ref 1–1.03)
TOTAL PROTEIN: 7.1 G/DL (ref 6.4–8.2)
URINE REFLEX TO CULTURE: ABNORMAL
URINE TYPE: ABNORMAL
UROBILINOGEN, URINE: 0.2 E.U./DL
WBC # BLD: 6.8 K/UL (ref 4–11)
WBC UA: NORMAL /HPF (ref 0–5)

## 2021-10-10 PROCEDURE — 85025 COMPLETE CBC W/AUTO DIFF WBC: CPT

## 2021-10-10 PROCEDURE — 6360000004 HC RX CONTRAST MEDICATION: Performed by: NURSE PRACTITIONER

## 2021-10-10 PROCEDURE — 83690 ASSAY OF LIPASE: CPT

## 2021-10-10 PROCEDURE — 6360000002 HC RX W HCPCS: Performed by: NURSE PRACTITIONER

## 2021-10-10 PROCEDURE — 96375 TX/PRO/DX INJ NEW DRUG ADDON: CPT

## 2021-10-10 PROCEDURE — 74177 CT ABD & PELVIS W/CONTRAST: CPT

## 2021-10-10 PROCEDURE — 99283 EMERGENCY DEPT VISIT LOW MDM: CPT

## 2021-10-10 PROCEDURE — 96374 THER/PROPH/DIAG INJ IV PUSH: CPT

## 2021-10-10 PROCEDURE — 80053 COMPREHEN METABOLIC PANEL: CPT

## 2021-10-10 PROCEDURE — 81001 URINALYSIS AUTO W/SCOPE: CPT

## 2021-10-10 RX ORDER — ONDANSETRON 2 MG/ML
4 INJECTION INTRAMUSCULAR; INTRAVENOUS EVERY 30 MIN PRN
Status: DISCONTINUED | OUTPATIENT
Start: 2021-10-10 | End: 2021-10-10 | Stop reason: HOSPADM

## 2021-10-10 RX ORDER — FENTANYL CITRATE 50 UG/ML
50 INJECTION, SOLUTION INTRAMUSCULAR; INTRAVENOUS ONCE
Status: DISCONTINUED | OUTPATIENT
Start: 2021-10-10 | End: 2021-10-10

## 2021-10-10 RX ORDER — FENTANYL CITRATE 50 UG/ML
25 INJECTION, SOLUTION INTRAMUSCULAR; INTRAVENOUS ONCE
Status: COMPLETED | OUTPATIENT
Start: 2021-10-10 | End: 2021-10-10

## 2021-10-10 RX ADMIN — IOPAMIDOL 75 ML: 755 INJECTION, SOLUTION INTRAVENOUS at 09:57

## 2021-10-10 RX ADMIN — FENTANYL CITRATE 25 MCG: 50 INJECTION, SOLUTION INTRAMUSCULAR; INTRAVENOUS at 09:20

## 2021-10-10 RX ADMIN — ONDANSETRON 4 MG: 2 INJECTION INTRAMUSCULAR; INTRAVENOUS at 09:19

## 2021-10-10 ASSESSMENT — PAIN SCALES - GENERAL
PAINLEVEL_OUTOF10: 8
PAINLEVEL_OUTOF10: 8

## 2021-10-10 ASSESSMENT — PAIN DESCRIPTION - ORIENTATION: ORIENTATION: RIGHT

## 2021-10-10 ASSESSMENT — PAIN DESCRIPTION - LOCATION: LOCATION: ABDOMEN;FLANK

## 2021-10-10 ASSESSMENT — PAIN DESCRIPTION - PAIN TYPE: TYPE: ACUTE PAIN

## 2021-10-10 NOTE — ED PROVIDER NOTES
Evaluated by [id number] [facility] Provider    [address] [facility]  ED    CHIEF COMPLAINT  Abdominal Pain (rlq pain started Tuesday. saw pmd and put on Bentyl)    HISTORY OF PRESENT ILLNESS  Ortega Loving is a 80 y.o. female who presents to the ED complaining of abdominal pain. Started out with pain in her abdomen and now it is going into her back. She does not have an appendix, gallbladder and has had a hysterectomy. Pain since Sunday of last week. She saw PCP on Tuesday, pain was intense. He prescribed bentyl. By the 2nd and 3rd day the pain was easing up. She was eating normally and pain was much better. This morning she woke up and pain was horrible. Denies pain or burning with urination. Denies nausea, vomiting, diarrhea one day. BMs now normal. Denies fevers, chills, maybe a little bit of sweats. The patient is currently rating their pain as 8/10 and describes it as an aching type of pain. Treatments tried prior to arrival in the ED: bentyl at 6 am.    The patient arrived to the ED via private car.     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    Macular degeneration     Osteoarthritis     Skull anomaly     had skull sugery for leaking fluid at same time of cochlear implant       SURGICAL HISTORY    Past Surgical History:   Procedure Laterality Date    APPENDECTOMY      APPENDECTOMY      CARDIAC CATHETERIZATION  2004    COCHLEAR IMPLANT  2012    COLONOSCOPY  11/16/2017    polyp    HYSTERECTOMY  1994    LUNG SURGERY      Left lung complete removal    SHOULDER SURGERY      right shoulder- no block given due to only having one lung    SHOULDER SURGERY Left     video arthroscopy, rotator cuff repair, biceps tenodesis       CURRENT MEDICATIONS Current Outpatient Rx   Medication Sig Dispense Refill    meloxicam (MOBIC) 7.5 MG tablet Take 1 tablet by mouth daily 30 tablet 3    Boswellia-Glucosamine-Vit D (OSTEO BI-FLEX ONE PER DAY PO) Take by mouth      nabumetone (RELAFEN) 500 MG tablet Take 1 tablet by mouth 2 times daily 60 tablet 3    fluticasone (FLONASE) 50 MCG/ACT nasal spray USE 2 SPRAYS NASALLY DAILY      mometasone-formoterol (DULERA) 200-5 MCG/ACT inhaler Inhale 2 puffs into the lungs      NONFORMULARY AREDS eye vitamins      albuterol (PROVENTIL HFA) 108 (90 BASE) MCG/ACT inhaler Inhale 2 puffs into the lungs every 6 hours as needed for Wheezing. 1 Inhaler 0    amLODIPine (NORVASC) 10 MG tablet Take  by mouth daily. Verify dose from H & P      Loratadine (CLARITIN) 10 MG CAPS Take  by mouth.  Clarify dose         ALLERGIES    Allergies   Allergen Reactions    Latex     Morphine And Related Anaphylaxis and Nausea And Vomiting    Percocet [Oxycodone-Acetaminophen] Shortness Of Breath     resp distress    Codeine      Other reaction(s): Not Recorded    Tetracycline     Tetracyclines & Related Swelling    Morphine Nausea And Vomiting       FAMILY HISTORY    Family History   Problem Relation Age of Onset    High Blood Pressure Father     Migraines Father     Heart Attack Father         cause death    Breast Cancer Sister        SOCIAL HISTORY    Social History     Socioeconomic History    Marital status:      Spouse name: None    Number of children: None    Years of education: None    Highest education level: None   Occupational History    None   Tobacco Use    Smoking status: Never Smoker    Smokeless tobacco: Never Used   Substance and Sexual Activity    Alcohol use: No    Drug use: No    Sexual activity: None   Other Topics Concern    None   Social History Narrative    None     Social Determinants of Health     Financial Resource Strain:     Difficulty of Paying Living Expenses:    Food Insecurity:     Worried About [facility] in the Last Year:    [address] in the Last Year:    Transportation Needs:     Lack of Transportation (Medical):  Lack of Transportation (Non-Medical):    Physical Activity:     Days of Exercise per Week:     Minutes of Exercise per Session:    Stress:     Feeling of Stress :    Social Connections:     Frequency of Communication with Friends and Family:     Frequency of Social Gatherings with Friends and Family:     Attends Anabaptism Services:     Active Member of Clubs or Organizations:     Attends Club or Organization Meetings:     Marital Status:    Intimate Partner Violence:     Fear of Current or Ex-Partner:     Emotionally Abused:     Physically Abused:     Sexually Abused:      REVIEW OFSYSTEMS    10systems reviewed, pertinent positives per HPI otherwise noted to be negative. PHYSICAL EXAM  Physical Exam  Vitals:    10/10/21 0821   BP: 136/69   Pulse: 62   Resp: 14   Temp: 98 °F (36.7 °C)   SpO2: 95%     GENERAL: Patient is well-developed, well-nourished. Awake andalert. Cooperative. Resting in bed. No apparent distress. HEENT:  Normocephalic, atraumatic. Conjunctivaappear normal. Sclera is non-icteric. External ears are normal.    NECK: Supple with normal ROM. Tracheamidline  LUNGS: Equal and symmetric chest rise. Breathing is unlabored. Speaking comfortably in fullsentences. Lungs are clear bilaterally to auscultation on the right side, absent on the left. Without wheezing, rales, or rhonchi. CADIOVASCULAR:  Regular rate and rhythm. Normal S1-S2 sounds. No murmurs, rubs, or gallops. GI: Soft, right flank tenderness to palpation, nondistended with positive bowel sounds. No rebound tenderness, guarding or rigidity. Negative Sheridan's sign. Negative Rovsing's sign. No CVAT to palpation. No masses or hepatosplenomegaly to palpation. MUSCULOSKELETAL:  No gross deformities or trauma noted. Moving all extremities equally and appropriately. Normal ROM. SKIN: Warm/dry. Skin is intact. No rashes or lesions noted. PSYCHIATRIC: Mood and affect appropriate. Speech is clear and articulate. NEUROLOGIC: Alert and oriented. No focal motor or sensory deficits.      LABS   Results for orders placed or performed during the hospital encounter of 10/10/21   Urinalysis Reflex to Culture    Specimen: Urine, clean catch   Result Value Ref Range    Color, UA Yellow Straw/Yellow    Clarity, UA Clear Clear    Glucose, Ur Negative Negative mg/dL    Bilirubin Urine Negative Negative    Ketones, Urine Negative Negative mg/dL    Specific Gravity, UA 1.015 1.005 - 1.030    Blood, Urine TRACE-INTACT (A) Negative    pH, UA 6.0 5.0 - 8.0    Protein, UA Negative Negative mg/dL    Urobilinogen, Urine 0.2 <2.0 E.U./dL    Nitrite, Urine Negative Negative    Leukocyte Esterase, Urine TRACE (A) Negative    Microscopic Examination YES     Urine Type NotGiven     Urine Reflex to Culture Not Indicated    Microscopic Urinalysis   Result Value Ref Range    WBC, UA 0-2 0 - 5 /HPF    RBC, UA 3-4 0 - 4 /HPF    Epithelial Cells, UA 2-5 0 - 5 /HPF   CBC Auto Differential   Result Value Ref Range    WBC 6.8 4.0 - 11.0 K/uL    RBC 4.59 4.00 - 5.20 M/uL    Hemoglobin 13.5 12.0 - 16.0 g/dL    Hematocrit 39.7 36.0 - 48.0 %    MCV 86.6 80.0 - 100.0 fL    MCH 29.5 26.0 - 34.0 pg    MCHC 34.0 31.0 - 36.0 g/dL    RDW 14.8 12.4 - 15.4 %    Platelets 942 527 - 862 K/uL    MPV 6.8 5.0 - 10.5 fL    Neutrophils % 64.2 %    Lymphocytes % 24.8 %    Monocytes % 9.3 %    Eosinophils % 1.1 %    Basophils % 0.6 %    Neutrophils Absolute 4.4 1.7 - 7.7 K/uL    Lymphocytes Absolute 1.7 1.0 - 5.1 K/uL    Monocytes Absolute 0.6 0.0 - 1.3 K/uL    Eosinophils Absolute 0.1 0.0 - 0.6 K/uL    Basophils Absolute 0.0 0.0 - 0.2 K/uL   Comprehensive Metabolic Panel   Result Value Ref Range    Sodium 137 136 - 145 mmol/L    Potassium 4.0 3.5 - 5.1 mmol/L    Chloride 105 99 - 110 mmol/L    CO2 24 21 - 32 mmol/L    Anion Gap 8 3 - 16    Glucose 117 (H) 70 - 99 mg/dL    BUN 22 (H) 7 - 20 mg/dL    CREATININE 0.7 0.6 - 1.2 mg/dL    GFR Non-African American >60 >60    GFR African American >60 >60    Calcium 9.2 8.3 - 10.6 mg/dL    Total Protein 7.1 6.4 - 8.2 g/dL    Albumin 4.1 3.4 - 5.0 g/dL    Albumin/Globulin Ratio 1.4 1.1 - 2.2    Total Bilirubin 0.3 0.0 - 1.0 mg/dL    Alkaline Phosphatase 82 40 - 129 U/L    ALT 14 10 - 40 U/L    AST 17 15 - 37 U/L    Globulin 3.0 Not Established g/dL   Lipase   Result Value Ref Range    Lipase 18.0 13.0 - 60.0 U/L     RADIOLOGY    CT ABDOMEN PELVIS W IV CONTRAST Additional Contrast? None    Result Date: 10/10/2021  EXAMINATION: CT OF THE ABDOMEN AND PELVIS WITH CONTRAST 10/10/2021 9:44 am TECHNIQUE: CT of the abdomen and pelvis was performed with the administration of intravenous contrast. Multiplanar reformatted images are provided for review. Dose modulation, iterative reconstruction, and/or weight based adjustment of the mA/kV was utilized to reduce the radiation dose to as low as reasonably achievable. COMPARISON: None. HISTORY: ORDERING SYSTEM PROVIDED HISTORY: RLQ abdominal pain TECHNOLOGIST PROVIDED HISTORY: Additional Contrast?->None Reason for exam:->RLQ abdominal pain Decision Support Exception - unselect if not a suspected or confirmed emergency medical condition->Emergency Medical Condition (MA) Reason for Exam: RLQ pain  and wraps around to her back x 1 week Acuity: Acute Type of Exam: Initial Additional signs and symptoms: trace hematuria Relevant Medical/Surgical History: appendectomy,cholecystectomy and hysterectomy FINDINGS: Lower Chest: The heart is completely shifted into the left hemithorax posterolateral.  The  projection shows no aerated lung. This likely is result of prior left total pneumonectomy. Organs; 1. Liver: The density is unremarkable with no focal suspicious liver lesion. 2. Gallbladder: The gallbladder is been removed. There is no biliary dilation.  3. Spleen: Normal. 4. Pancreas: Unremarkable. 5. Kidneys: Unremarkable without hydronephrosis. 6. Adrenal glands: Normal. GI/Bowel: There is no distention, obstruction or significant inflammatory change. The appendix is not seen however there is no pericecal inflammatory change. Some small bowel fluid is present. There is no obstruction. Some formed stool is seen in the colon greater to the right. Several small diverticuli are incidentally noted primarily in the sigmoid colon. Pelvis: Urinary bladder is unremarkable. There is no free pelvic fluid. The uterus is absent. Peritoneum/Retroperitoneum: There is no mass or adenopathy. Vasculature: Unremarkable for age. Soft Tissues/Bones: Unremarkable. Degenerative change with levoscoliosis lumbar spine is seen with facet arthropathy and interspace osteophyte formation. Bilateral degenerative changes are present. No acute abnormality. Apparent prior total left pneumonectomy. This should be detectable by auscultation. Diverticulosis without inflammatory change. ED COURSE/MDM  Patient seen and evaluated. Old records reviewed. Diagnostic testing reviewed and results discussed. I have evaluated this patient. My supervising physician was available for consultation. Marsha Martinez presented to the ED today with above noted complaints. Arrival vital signs: Afebrile and hemodynamically stable. Well saturated on room air. Physical exam performed at 0910: There is right flank tenderness to palpation. Blood work: Shows no evidence of systemic infection. No anemia. No electrolyte abnormality. No evidence of acute kidney injury or transaminitis. Lipase is normal.  EKG:  UA: No evidence of infection. No blood to suggest kidney stone. Imaging: CT of the abdomen and pelvis shows no acute abdominal findings. Prior total left pneumonectomy. Diverticulosis without inflammatory changes.     Medications given in the ED:   Medications   ondansetron (ZOFRAN) injection 4 mg (4 mg IntraVENous Given 10/10/21 0919)   fentaNYL (SUBLIMAZE) injection 25 mcg (25 mcg IntraVENous Given 10/10/21 0920)   iopamidol (ISOVUE-370) 76 % injection 75 mL (75 mLs IntraVENous Given 10/10/21 0957)      I advised patient of the above findings. I encouraged her to continue using the Bentyl to help with pain control. I also suggested trying Gas-X to see if it could potentially be gas pain that is causing her abdominal discomfort. Patient was instructed to follow-up with primary care provider, call tomorrow. She was also given strict ED return precautions. At this point I do not feel the patient requires further work upand it is reasonable to discharge the patient. Please refer to AVS for further details regarding discharge instructions. A discussion was had with the patient regarding diagnosis, diagnostic testing results,treatment/ plan of care, and follow up. All questions were answered. Patient will follow up as directed for further evaluation/treatment. The patient was given strict return precautions as we discussed symptoms that wouldnecessitate return to the ED. Patient will return to ED for new/worsening symptoms. The patient verbalized their understanding and agreement with the above plan. Patient was sent home with a prescription for below medication/s. I did [place] patient on appropriate use of these medication. New Prescriptions    No medications on file       I estimatethere is LOW risk for ACUTE APPENDICITIS, BOWEL OBSTRUCTION, CHOLECYSTITIS, DIVERTICULITIS, INCARCERATED HERNIA, PANCREATITIS, or PERFORATED BOWEL or ULCER, thus I consider the discharge disposition reasonable. Also, thereis no evidence or peritonitis, sepsis, or toxicity. Rush Murillo and I have discussed the diagnosis and risks, and we agree with discharging home to follow-up with their primary doctor. We also discussed returning to the EmergencyDepartment immediately if new or worsening symptoms occur.  We have discussed the symptoms which are most concerning (e.g., bloody stool, fever, changing or worsening pain, vomiting) that necessitate immediate return. CLINICAL IMPRESSION    1. Right lower quadrant abdominal pain    2. Acute right flank pain           Discharge Vitals:  Blood pressure 136/69, pulse 62, temperature 98 °F (36.7 °C), temperature source Oral, resp. rate 14, height 5' 1\" (1.549 m), weight 157 lb (71.2 kg), SpO2 95 %. FOLLOW UP  Arsalan Parr MD  [place] [id number] [facility]  [phone]    Call in 1 day  For further evaluation    [facility]  ED  [facility] [address]  [phone]  Go to   If symptoms worsen      DISPOSITION  Patient was discharged to home in good condition. Comment: Pleasenote this report has been produced using speech recognition software and may contain errors related to that system including errors in grammar, punctuation, and spelling, as well as words and phrases that may beinappropriate. If there are any questions or concerns please feel free to contact the dictating provider for clarification.         FLOR Barrett - MAIKEL  10/10/21 105

## 2021-10-22 ENCOUNTER — OFFICE VISIT (OUTPATIENT)
Dept: ORTHOPEDIC SURGERY | Age: 81
End: 2021-10-22
Payer: MEDICARE

## 2021-10-22 ENCOUNTER — TELEPHONE (OUTPATIENT)
Dept: ORTHOPEDIC SURGERY | Age: 81
End: 2021-10-22

## 2021-10-22 VITALS — WEIGHT: 157 LBS | HEIGHT: 61 IN | BODY MASS INDEX: 29.64 KG/M2 | RESPIRATION RATE: 18 BRPM

## 2021-10-22 DIAGNOSIS — M16.11 ARTHRITIS OF RIGHT HIP: Primary | ICD-10-CM

## 2021-10-22 PROCEDURE — 99213 OFFICE O/P EST LOW 20 MIN: CPT | Performed by: ORTHOPAEDIC SURGERY

## 2021-10-22 RX ORDER — METHYLPREDNISOLONE 4 MG/1
4 TABLET ORAL SEE ADMIN INSTRUCTIONS
Qty: 1 KIT | Refills: 0 | Status: SHIPPED | OUTPATIENT
Start: 2021-10-22 | End: 2021-10-28

## 2021-10-22 NOTE — PROGRESS NOTES
[facility]  [facility]    Chief Complaint: Right hip pain    HPI:  Mor Gao is a 80 y.o. who is here for evaluation of right hip pain. She reports right hip pain for the past 1 month that was acutely worsened today. The pain is usually in her groin. Today it is more in her buttock and is worse than it usually is. She reports that her right leg feels weak at times as well. She rates the pain as 8/10. She tried taking Aleve for this this morning with mild relief of symptoms. She has also recently taken a Medrol Dosepak with mild relief of symptoms. There are no new injuries. There is no history of injury or surgery to the right leg. Her medical history is significant for having only one lung. She is hopeful to get some pain relief as she has a wedding to attend tomorrow. Patient Active Problem List   Diagnosis    Rotator cuff tear    Acromioclavicular joint arthritis    Bicipital tenosynovitis    Pes anserine bursitis    Primary osteoarthritis of left knee    Acute medial meniscus tear of left knee    Primary osteoarthritis of both knees    Chronic pain of left knee    Chondromalacia of both patellae       ROS:  Constitutional: denies fever, chills, weight loss  MSK: denies pain in other joints, muscle aches  Neurological: denies numbness, tingling, weakness    Exam:  Resp. rate 18, height 5' 1\" (1.549 m), weight 157 lb (71.2 kg). Appearance: sitting in exam room chair, appears to be in no acute distress, awake and alert  Resp: unlabored breathing on room air  Skin: warm, dry and intact with out erythema or significant increased temperature  Neuro: grossly intact both lower extremities. Intact sensation to light touch. Motor exam 4+ to 5/5 in all major motor groups. RLE: Examination demonstrates mild pain with logroll and Stinchfield. Nontender over greater trochanter. There is brisk capillary refill.   There are 2+ dorsalis pedis and posterior tibial pulses. Strength is 5/5 in hamstrings, quads, hip flexors. Imaging:  AP pelvis, AP and frog-leg lateral views of bilateral hips were performed and interpreted today. She has bilateral hip joint space narrowing with large periarticular osteophytes. Part of her lumbar spine is visualized on the AP pelvis radiograph and shows degenerative changes as well. 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 I recommended a diagnostic and therapeutic right hip injection as early as next Thursday. Her symptoms are likely coming from her hip but may also have a contribution from her low back. Because she does want some pain relief as soon as tonight and tomorrow for a wedding she is then, I prescribed a Medrol Dosepak. I will see her back for her hip injection and in 6 weeks following that for reassessment. Total time spent on today's encounter was at least 24 minutes. This time included reviewing prior notes, radiographs, and lab results when available, reviewing history obtained by medical assistant, performing history and physical exam, reviewing tests/radiographs with the patient, counseling the patient, ordering medications or tests, documentation in the electronic health record, and coordination of care. This dictation was done with [employer] dictation and may contain mechanical errors related to translation.

## 2021-10-22 NOTE — LETTER
[address] [place] [address] Ortho & Spine  Surgery Scheduling Form:      DEMOGRAPHICS:                                                                                                              .Patient Name:  Rebeca Flood  Patient :  1940   Patient SS#:      Patient Phone:  [phone] (home)  Alt. Patient Phone:    Patient Address:  [place] [address]    PCP:  Merry Vázquez MD  Insurance:  Payor: Tran De La Cruz / Plan: Diamond Olmos PPO / Product Type: Medicare /    Payor/Plan Subscr  Sex Relation Sub. Ins. ID Effective Group Num   1.  Tran De La Cruz* RUDY CANSECO 1940 Female Self [id number]                                   [address]     DIAGNOSIS & PROCEDURE:                                                                                              Diagnosis:   Right Hip arthritis     M16.11  Operation:  Right Hip injection under fluoro    [id number]  Location:  [place]  Surgeon:  Nirmala Velasco MD    SCHEDULING INFORMATION:                                                                                         .    Surgeon's Scheduling Instruction: 3.5cc of injectable 0.25%Marcaine and 1.5cc of injectable Kenalog 40 mg  Requested Date:   10/28/21 OR Time:   Patient Arrival Time:    OR Time Required:     Anesthesia:    Equipment:    Mini C-Arm:     Standard C-Arm:    Status: outpatient  PAT Required:    Comments:                        Joaquin Hernandez MD      10/22/21  BILLING INFORMATION:                                                                                                     Procedure:       CPT Code Modifier  21981  44602    Right Hip injection under fluoro                            Pre-Certification:

## 2021-10-27 ENCOUNTER — TELEPHONE (OUTPATIENT)
Dept: ORTHOPEDIC SURGERY | Age: 81
End: 2021-10-27

## 2021-10-27 NOTE — TELEPHONE ENCOUNTER
General Question     Subject: INJECTION  Patient and /or Facility Request: PATIENT WANTS TO KNOW IF THERE WILL BE A RECOVERY TIME AFTER THE HIP INJECTION WHERE SHE WILL HAVE TO WAIT.  SHE IS NEEDING TO NOTIFY HER TRANSPORTATION, OR IF SHE WILL BE OK TO LEAVE RIGHT AFTER  Contact Number: [phone]

## 2021-10-28 ENCOUNTER — OFFICE VISIT (OUTPATIENT)
Dept: ORTHOPEDIC SURGERY | Age: 81
End: 2021-10-28
Payer: MEDICARE

## 2021-10-28 ENCOUNTER — HOSPITAL ENCOUNTER (OUTPATIENT)
Dept: GENERAL RADIOLOGY | Age: 81
Discharge: HOME | End: 2021-10-28
Payer: MEDICARE

## 2021-10-28 DIAGNOSIS — M16.11 ARTHRITIS OF RIGHT HIP: ICD-10-CM

## 2021-10-28 DIAGNOSIS — M16.11 ARTHRITIS OF RIGHT HIP: Primary | ICD-10-CM

## 2021-10-28 PROCEDURE — 77002 NEEDLE LOCALIZATION BY XRAY: CPT | Performed by: ORTHOPAEDIC SURGERY

## 2021-10-28 PROCEDURE — 20610 DRAIN/INJ JOINT/BURSA W/O US: CPT

## 2021-10-28 PROCEDURE — 6360000002 HC RX W HCPCS

## 2021-10-28 PROCEDURE — 77002 NEEDLE LOCALIZATION BY XRAY: CPT

## 2021-10-28 PROCEDURE — 2500000003 HC RX 250 WO HCPCS

## 2021-10-28 PROCEDURE — 20610 DRAIN/INJ JOINT/BURSA W/O US: CPT | Performed by: ORTHOPAEDIC SURGERY

## 2021-10-28 NOTE — PROGRESS NOTES
After obtaining the patient's consent, the patient was placed supine on the fluoroscopy table. The right groin was prepped with chlorhexidine. The hip was visualized under fluoroscopic guidance. The needle was placed anteriorly into the right hip joint and aspiration was performed. No blood or joint fluid was aspirated. A mixture of 3.5mL 0.25% marcaine and 1.5mL 40 mg/mL Kenalog was injected into the right hip joint. The needle was removed and a bandage was applied. The patient tolerated the procedure without any difficulty. The patient was injected for degenerative arthritis of the hip.

## 2022-01-03 DIAGNOSIS — G89.29 CHRONIC PAIN OF RIGHT KNEE: ICD-10-CM

## 2022-01-03 DIAGNOSIS — M22.42 CHONDROMALACIA OF BOTH PATELLAE: ICD-10-CM

## 2022-01-03 DIAGNOSIS — G89.29 CHRONIC PAIN OF LEFT KNEE: ICD-10-CM

## 2022-01-03 DIAGNOSIS — M25.561 CHRONIC PAIN OF RIGHT KNEE: ICD-10-CM

## 2022-01-03 DIAGNOSIS — M22.41 CHONDROMALACIA OF BOTH PATELLAE: ICD-10-CM

## 2022-01-03 DIAGNOSIS — M25.562 CHRONIC PAIN OF LEFT KNEE: ICD-10-CM

## 2022-01-03 DIAGNOSIS — M17.0 PRIMARY OSTEOARTHRITIS OF BOTH KNEES: Primary | ICD-10-CM

## 2022-01-13 ENCOUNTER — TELEPHONE (OUTPATIENT)
Dept: ORTHOPEDIC SURGERY | Age: 82
End: 2022-01-13

## 2022-01-13 NOTE — TELEPHONE ENCOUNTER
Spoke to patient and let them know that [address] injections have been approved for their bilateral knees. Scheduled patient for those injections.
No pertinent family history in first degree relatives     No pertinent family history in first degree relatives

## 2022-01-21 ENCOUNTER — OFFICE VISIT (OUTPATIENT)
Dept: ORTHOPEDIC SURGERY | Age: 82
End: 2022-01-21
Payer: MEDICARE

## 2022-01-21 VITALS — WEIGHT: 158 LBS | BODY MASS INDEX: 29.83 KG/M2 | HEIGHT: 61 IN

## 2022-01-21 DIAGNOSIS — M79.671 RIGHT FOOT PAIN: Primary | ICD-10-CM

## 2022-01-21 PROCEDURE — 99203 OFFICE O/P NEW LOW 30 MIN: CPT | Performed by: PHYSICIAN ASSISTANT

## 2022-01-21 NOTE — PROGRESS NOTES
Chief Complaint    Foot Pain (dropped a frozen water bottle on it, has an appt next week with a podiatrist, but she's having new concerns today that she didn't want to wait for)      History of Present Illness:  Gulshan Santana is a 80 y.o. female here for evaluation chief complaint of right second toe injury. Patient states that approximately 1 month ago she inadvertently dropped a frozen water bottle on her right foot impacting the distal aspect of her right second toe. She states that she had immediate pain with some swelling and mild ecchymosis. She was able to continue with ambulation but states that she has had continued pain within the distal aspect of the right second toe. She denies any prior injuries to the right second toe but does have an upcoming appointment with the podiatrist.  She is presently ambulating without an assistive device or limp. Pain Assessment  Location of Pain: Toe  Location Modifiers: Right  Severity of Pain: 3  Quality of Pain: Dull  Duration of Pain: Persistent  Frequency of Pain: Constant  Aggravating Factors: Walking  Relieving Factors: Rest  Result of Injury: Yes  Work-Related Injury: No  Are there other pain locations you wish to document?: No]   ]      Medical History:  Patient's medications, allergies, past medical, surgical, social and family histories were reviewed and updated as appropriate. Review of Systems:  Pertinent items are noted in HPI  Review of systems reviewed from Patient History Form dated on 1/21/2022 and available in the patient's chart under the Media tab. Vital Signs:  Ht 5' 1\" (1.549 m)   Wt 158 lb (71.7 kg)   BMI 29.85 kg/m²     General Exam:   Constitutional: Patient is adequately groomed with no evidence of malnutrition  DTRs: Deep tendon reflexes are intact  Mental Status: The patient is oriented to time, place and person. The patient's mood and affect are appropriate.   Lymphatic: The lymphatic examination bilaterally reveals all areas to be without enlargement or induration. Right second toe examination:    Inspection: Today's inspection of right second toe reveals hammertoe deformity but there is no obvious deformity of the distal phalanx. There is no obvious swelling, erythema, or ecchymosis noted. Palpation: Patient is diffusely tender to palpation over the distal phalanx of the second toe and there is no other palpable pain. Range of Motion: Decreased due to the hammertoe deformity    Strength: There is no strength deficits noted upon testing    Special Tests: Distal nervous status grossly intact    Skin: There are no rashes, ulcerations or lesions. Gait: Mildly antalgic    Distal neurovascular is grossly intact    Radiology:     X-rays obtained and reviewed in office:  Views 3 views of the right foot to include AP, lateral, oblique were obtained today in the office and independently reviewed with the patient  Location right foot  Impression there are hammertoe deformities noted within the right foot with calcaneal spur noted. There are no obvious fractures that are acute or subacute noted within the right second toe. Assessment : Contusion right second toe    Impression:  Encounter Diagnosis   Name Primary?  Right foot pain Yes       Office Procedures:  Orders Placed This Encounter   Procedures    XR FOOT RIGHT (MIN 3 VIEWS)     Standing Status:   Future     Number of Occurrences:   1     Standing Expiration Date:   2/21/2022        Treatment Plan:  The etiology of contusion of toe and it's appropriate treatment were discussed in great detail. Patient is going to continue with her conservative treatment and comfortable shoe wear and she will follow-up with her podiatrist next week for their continue evaluation and care.     Follow-up: As needed    Patient examined and note dictated by Patricio Cavazos PA-C.

## 2022-01-23 ENCOUNTER — HOSPITAL ENCOUNTER (EMERGENCY)
Age: 82
Discharge: HOME | End: 2022-01-23
Attending: EMERGENCY MEDICINE
Payer: MEDICARE

## 2022-01-23 ENCOUNTER — APPOINTMENT (OUTPATIENT)
Dept: GENERAL RADIOLOGY | Age: 82
End: 2022-01-23
Payer: MEDICARE

## 2022-01-23 VITALS
TEMPERATURE: 97.8 F | RESPIRATION RATE: 19 BRPM | DIASTOLIC BLOOD PRESSURE: 68 MMHG | HEART RATE: 80 BPM | OXYGEN SATURATION: 98 % | SYSTOLIC BLOOD PRESSURE: 115 MMHG

## 2022-01-23 DIAGNOSIS — M72.2 PLANTAR FASCIITIS: Primary | ICD-10-CM

## 2022-01-23 PROCEDURE — 99283 EMERGENCY DEPT VISIT LOW MDM: CPT

## 2022-01-23 PROCEDURE — 73630 X-RAY EXAM OF FOOT: CPT

## 2022-01-23 PROCEDURE — 6370000000 HC RX 637 (ALT 250 FOR IP): Performed by: EMERGENCY MEDICINE

## 2022-01-23 RX ORDER — IBUPROFEN 800 MG/1
800 TABLET ORAL ONCE
Status: COMPLETED | OUTPATIENT
Start: 2022-01-23 | End: 2022-01-23

## 2022-01-23 RX ADMIN — IBUPROFEN 800 MG: 800 TABLET, FILM COATED ORAL at 08:52

## 2022-01-23 ASSESSMENT — ENCOUNTER SYMPTOMS
COUGH: 0
WHEEZING: 0
RHINORRHEA: 0
PHOTOPHOBIA: 0
BACK PAIN: 0
VOMITING: 0
SHORTNESS OF BREATH: 0
DIARRHEA: 0
ABDOMINAL DISTENTION: 0
NAUSEA: 0

## 2022-01-23 ASSESSMENT — PAIN SCALES - GENERAL: PAINLEVEL_OUTOF10: 4

## 2022-01-23 NOTE — ED NOTES
Applied ace wrap to pt's right foot/ankle. Used a 2 inch and a 4 inch ace wrap. Pt tolerated procedure. RN Notified.      Ivy Savage  01/23/22 0983

## 2022-01-23 NOTE — ED PROVIDER NOTES
Emergency Department Provider Note  Location: [address] [place] [address]  ED  1/23/2022     Patient Identification  Asad Pinto is a 80 y.o. female    Chief Complaint  Foot Injury (pt state that on thanksgiving pt drop a bottle of water on her foot and pt state has had pain in foot since pt state that she had xrays done on friday and they were negative . . )          HPI  (History provided by patient)  Patient is a 77-year-old female who presents with persistent right-sided foot pain after blunt trauma that occurred around Thanksgiving. States that she dropped a frozen large water bottle on the dorsal aspect of her foot from shoulder height. Since then she has had persistent pain both dorsal and plantar aspect mainly when she bears weight. This morning it was particularly bad. Also reported having mild \"spots of heat below my knees\". She does not have these currently. There is no swelling there is no persistent redness. No history of DVT PE. She had negative plain films 2 days ago for the foot and was instructed to follow-up with podiatry. She has not been able to arrange follow-up appointment yet. No fevers chills proximal streaking. I have reviewed the following nursing documentation:  Allergies: Allergies   Allergen Reactions    Latex     Morphine And Related Anaphylaxis and Nausea And Vomiting    Percocet [Oxycodone-Acetaminophen] Shortness Of Breath     resp distress    Codeine      Other reaction(s): Not Recorded    Tetracycline     Tetracyclines & Related Swelling    Morphine Nausea And Vomiting       Past medical history:  has a past medical history of Asthma, Ear discomfort, Hepatitis C, Hypertension, Liver disease, Lung disorder, Macular degeneration, Osteoarthritis, and Skull anomaly. Past surgical history:  has a past surgical history that includes Appendectomy; Lung surgery; Appendectomy; Cochlear implant (2012); Cardiac catheterization (2004);  Hysterectomy (1994); shoulder surgery; shoulder surgery (Left); and Colonoscopy (11/16/2017). Home medications:   Prior to Admission medications    Medication Sig Start Date End Date Taking? Authorizing Provider   meloxicam (MOBIC) 7.5 MG tablet Take 1 tablet by mouth daily 10/21/20   Simone Nguyen MD   Boswellia-Glucosamine-Vit D (OSTEO BI-FLEX ONE PER DAY PO) Take by mouth    Historical Provider, MD   nabumetone (RELAFEN) 500 MG tablet Take 1 tablet by mouth 2 times daily 10/14/19   Simone Nguyen MD   fluticasone (FLONASE) 50 MCG/ACT nasal spray USE 2 SPRAYS NASALLY DAILY 1/30/17   Historical Provider, MD   mometasone-formoterol [facility]) 200-5 MCG/ACT inhaler Inhale 2 puffs into the lungs 3/2/17   Historical Provider, MD   NONFORMULARY AREDS eye vitamins    Historical Provider, MD   albuterol (PROVENTIL HFA) 108 (90 BASE) MCG/ACT inhaler Inhale 2 puffs into the lungs every 6 hours as needed for Wheezing. 10/16/14   Naheed Fitzgerald MD   amLODIPine (NORVASC) 10 MG tablet Take  by mouth daily. Verify dose from H & P    Historical Provider, MD   Loratadine (CLARITIN) 10 MG CAPS Take  by mouth. Clarify dose    Historical Provider, MD       Social history:  reports that she has never smoked. She has never used smokeless tobacco. She reports that she does not drink alcohol and does not use drugs. Family history:    Family History   Problem Relation Age of Onset    High Blood Pressure Father     Migraines Father     Heart Attack Father         cause death    Breast Cancer Sister          ROS  Review of Systems   Constitutional: Negative for chills and fever. HENT: Negative for congestion and rhinorrhea. Eyes: Negative for photophobia and visual disturbance. Respiratory: Negative for cough, shortness of breath and wheezing. Cardiovascular: Negative for chest pain and palpitations. Gastrointestinal: Negative for abdominal distention, diarrhea, nausea and vomiting. Genitourinary: Negative for dysuria and hematuria. Musculoskeletal: Positive for arthralgias. Negative for back pain and neck pain. Skin: Negative for rash and wound. Neurological: Negative for syncope and weakness. Psychiatric/Behavioral: Negative for agitation and confusion. Exam  ED Triage Vitals [01/23/22 0755]   BP Temp Temp Source Pulse Resp SpO2 Height Weight   115/68 97.8 °F (36.6 °C) Oral 80 19 98 % -- --       Physical Exam  Vitals and nursing note reviewed. Constitutional:       General: She is not in acute distress. Appearance: She is well-developed. HENT:      Head: Normocephalic and atraumatic. Nose: Nose normal. No congestion. Eyes:      Pupils: Pupils are equal, round, and reactive to light. Cardiovascular:      Rate and Rhythm: Normal rate and regular rhythm. Heart sounds: No murmur heard. Pulmonary:      Effort: Pulmonary effort is normal.      Breath sounds: Normal breath sounds. Abdominal:      General: There is no distension. Palpations: Abdomen is soft. Tenderness: There is no abdominal tenderness. Musculoskeletal:         General: No deformity. Normal range of motion. Cervical back: Normal range of motion and neck supple. Comments: Digits 2 through 5 are chronically flexed in both feet. There is tenderness along the arch of the foot plantar aspect. No tenderness over the base of the fifth metatarsal.  Normal range of motion of the ankle. There is no edema or calf tenderness. There is no redness. Skin:     General: Skin is warm. Findings: No rash. Neurological:      Mental Status: She is alert and oriented to person, place, and time. Motor: No abnormal muscle tone.       Coordination: Coordination normal.   Psychiatric:         Mood and Affect: Mood normal.         Behavior: Behavior normal.           ED Course    ED Medication Orders (From admission, onward)    Start Ordered     Status Ordering Provider    01/23/22 0830 01/23/22 0820  ibuprofen (ADVIL;MOTRIN) tablet 800 mg  ONCE         Last MAR action: Given - by Kayli Amin on 01/23/22 at [address] [facility]          Radiology  XR FOOT RIGHT (MIN 3 VIEWS)    Result Date: 1/23/2022  EXAMINATION: 5 XRAY VIEWS OF THE RIGHT FOOT 1/23/2022 8:22 am COMPARISON: 01/21/2022 HISTORY: ORDERING SYSTEM PROVIDED HISTORY: pain TECHNOLOGIST PROVIDED HISTORY: Reason for exam:->pain Reason for exam:->Please also get weightbearing image Reason for Exam: dropped water bottle on right foot, pain x 2 months, pain up leg FINDINGS: There is no acute fracture or dislocation. The bones are normally mineralized. There are no bony destructive lesions. Mild-to-moderate degenerative changes involve the 1st MTP joint and midfoot. Chronic flexion of the 2nd through 4th PIP joints is unchanged. A moderate calcaneal enthesophyte is noted. 1. No acute abnormality. Labs  No results found for this visit on 01/23/22. [facility]  Patient seen and evaluated. Relevant records reviewed. 53-year-old female presents with persistent chronic right-sided foot pain mainly on the plantar aspect. She is well-appearing on exam reassuring vitals. She is ambulatory into the emergency department. She has no objective swelling or redness. Low concern for DVT or any infectious process. Plain films including weightbearing do not show any skeletal explanation for her symptoms. May be consistent with plantar fasciitis. I discussed stretches and other supportive care and podiatry follow-up. Return precautions discussed. Patient agreeable to plan expressed understanding of plan. Clinical Impression:  1. Plantar fasciitis          Disposition:  Discharge to home in good condition. Blood pressure 115/68, pulse 80, temperature 97.8 °F (36.6 °C), temperature source Oral, resp. rate 19, SpO2 98 %. Patient was given scripts for the following medications. I counseled patient how to take these medications.    New Prescriptions    No medications on file       Disposition referral (if applicable): Your podiatrist    Schedule an appointment as soon as possible for a visit           Total critical care time is 0 minutes, which excludes separately billable procedures and updating family. Time spent is specifically for management of the presenting complaint and symptoms initially, direct bedside care, reevaluation, review of records, and consultation. There was a high probability of clinically significant life-threatening deterioration in the patient's condition, which required my urgent intervention. This chart was generated in part by using Dragon Dictation system and may contain errors related to that system including errors in grammar, punctuation, and spelling, as well as words and phrases that may be inappropriate. If there are any questions or concerns please feel free to contact the dictating provider for clarification.      Luis Manuel Roy MD  [address] MD  01/23/22 1371

## 2022-01-25 ENCOUNTER — TELEPHONE (OUTPATIENT)
Dept: ORTHOPEDIC SURGERY | Age: 82
End: 2022-01-25

## 2022-01-25 NOTE — TELEPHONE ENCOUNTER
SPOKE TO PATIENT, REASSURED HER THAT IT WOULD BE FINE FOR US TO SEE HER TOMORROW FOR HER KNEES, SHE COULD STILL GET INJECTIONS, AND WE COULD DIRECT CARE FOR HER FOOT AS WELL.

## 2022-01-25 NOTE — TELEPHONE ENCOUNTER
General Question     Subject: PATIENT WAS SEEN IN THE ER FOR FOOT PAIN AND PRESCRIBED ANTIINFLAMMATORY MEDICATION. SHE HAS AN APPOINTMENT 1/26 AND WOULD LIKE TO CONFIRM SHE IS OK TO COME AND RECEIVE THE INJECTION IN HER KNEE. PLEASE ADVISE.    Patient and /or Facility Request: Jacquelin Blunt \"Bri\"  Contact Number: [phone]

## 2022-01-26 ENCOUNTER — OFFICE VISIT (OUTPATIENT)
Dept: ORTHOPEDIC SURGERY | Age: 82
End: 2022-01-26
Payer: MEDICARE

## 2022-01-26 VITALS — BODY MASS INDEX: 29.83 KG/M2 | WEIGHT: 158 LBS | HEIGHT: 61 IN

## 2022-01-26 DIAGNOSIS — M22.42 CHONDROMALACIA OF BOTH PATELLAE: ICD-10-CM

## 2022-01-26 DIAGNOSIS — G89.29 CHRONIC PAIN OF RIGHT KNEE: ICD-10-CM

## 2022-01-26 DIAGNOSIS — M22.41 CHONDROMALACIA OF BOTH PATELLAE: ICD-10-CM

## 2022-01-26 DIAGNOSIS — M25.562 CHRONIC PAIN OF LEFT KNEE: ICD-10-CM

## 2022-01-26 DIAGNOSIS — M25.561 CHRONIC PAIN OF RIGHT KNEE: ICD-10-CM

## 2022-01-26 DIAGNOSIS — M17.0 PRIMARY OSTEOARTHRITIS OF BOTH KNEES: Primary | ICD-10-CM

## 2022-01-26 DIAGNOSIS — G89.29 CHRONIC PAIN OF LEFT KNEE: ICD-10-CM

## 2022-01-26 PROCEDURE — 20610 DRAIN/INJ JOINT/BURSA W/O US: CPT | Performed by: FAMILY MEDICINE

## 2022-01-26 NOTE — PROGRESS NOTES
Chief Complaint    Knee Pain (ORTHOVISC #1 B KNEES) and Foot Pain (OPNP R FOOT)    Evaluation recurrent worsening right knee pain with known history of left knee multicompartment osteoarthritis status post completion of bilateral knee Euflexxa 7/12/2021    History of Present Illness:  Casandra Massey is a 80 y.o. female who is a very nice patient of Dr. Celia Hobbs and longstanding patient of Dr. Lindy Kim is being seen today for evaluation of recurrent right greater than left knee pain. Once again she is known to have fairly substantial bilateral knee patellofemoral arthropathy with medial compartment osteoarthritis left greater than right but is more symptomatic on the right-hand side. She will last received her rounds of viscosupplementation her knees bilaterally on 3/9/2020 and was doing reasonably well up until early October 2020 when she began to notice a gradual onset of pain to the anterior medial portion of her knees. There is no history of actual injury or new activity prior to becoming symptomatic. She does complain of an achy pain which is fairly minor at rest at 1-2 out of 10 but when she is up and walking particular doing lots of stairs and distance walking or working out in the yard, her pain can be quite prominent at 5-6 out of 10. She feels as if her Euflexxa is beginning to wear out. She has been a little bit lax in performing her home-based exercise program and was able to wean off of her meloxicam when the Euflexxa was working properly for her. Denies true locking catching or instability symptoms. She is being seen today for orthopedic and sports consultation with updated imaging. Collin Pearson was last seen in the office on 10/21/2020 and was continued on conservative treatment for her bilateral knee osteoarthritis. She presents back today stating she is doing much better with more of an achiness at 2-3 out of 10.   She denies locking or catching in her ability to walk while it is better is still somewhat difficult particularly she does walk distances attempt to squat and kneel and go up and down stairs. She continues to deny active locking or catching and is sleeping more comfortably. She has tolerated her meloxicam and is interested in once again in pursuing viscosupplementation which is helped her substantially. Her last round was completed on 3/9/2020 and she would like to avoid knee arthroplasty as long as she can. We last already in the office on 11/23/2020 and is being seen today for reevaluation of her bilateral knee osteoarthritis with patellofemoral arthropathy. Recently she has noted some increasing achiness but only about a 1-2 out of 10 but like to pursue viscosupplementation once again. She does continue to walk and only takes her Aleve on a very episodic basis. She has gotten substantial benefit from viscosupplementation in the past and does work on her home-based exercises. She denies substantial rest or night pain. She will have some soreness anteriorly if she does lots of stairs but for the most part is tolerating this. She would like to put off knee arthroplasty as long as she can but would like to pursue viscosupplementation once again. Alhaji Brock was last seen in the office on 7/12/2021 we completed viscosupplementation with Visco 3 to her knees. She did reasonably well become a little more sore lately. She is trying to perform her exercise program and does take Aleve very episodically and also utilize her Voltaren gel on an episodic basis. There is no recent injury to her knee but she did have an episode in roughly Thanksgiving 2021 which a frozen bottle of water fell onto the top of her freezer striking the distal aspect of her right foot. She did have immediate pain and did develop some occultly with walking and had only mild swelling but no ecchymosis.   She does have underlying hammer toeing to toes 2 through 4 believing that she had she sustained a contusion put off initial evaluation but due to persistence of pain, she was seen in after-hours on 1/21/2002 and subsequently in the emergency room on 1/23/2022 for episodic foot pain. Her x-rays are negative for subacute fracture at that point and did show hammer toeing. She was encouraged to utilize her Voltaren gel and seek follow-up with her podiatrist Dr. Catrina Penny and does have an appointment to see them on 1/28/2022. 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 She does not have supplemental orthotics. She does have episodic pain and while she does have some soreness over the heel she is also having some lateral ankle tenderness likely consistent with peroneal tendinitis. She is a type of rehabilitation. Medical History:  Patient's medications, allergies, past medical, surgical, social and family histories were reviewed and updated as appropriate. Review of Systems:  Pertinent items are noted in HPI  Review of systems reviewed from Patient History Form dated on 10/21/2020 and available in the patient's chart under the Media tab. Vital Signs:  Ht 5' 1\" (1.549 m)   Wt 158 lb (71.7 kg)   BMI 29.85 kg/m²     General Exam:   Constitutional: Patient is adequately groomed with no evidence of malnutrition  Mental Status: The patient is oriented to time, place and person. The patient's mood and affect are appropriate. Lymphatic: The lymphatic examination bilaterally reveals all areas to be without enlargement or induration. Knee Examination:    Left Inspection: Does have a trace knee joint effusion without high-grade deformity knees bilaterally. She does have patellofemoral crepitation. Palpation:  Left knee reveals good ligamentous stability in all directions with negative Lachman, drawer, pivot shift and posterior drawer.   Good medial and lateral stability at 0 and 30°. She has more moderate tenderness peripatellar and retropatellar medial and lateral facet. Negative apprehension sign. Minimal medial or lateral joint line tenderness and negative Natasha sign. No masses in the popliteal space. Range of Motion:  0-130° left    Strength:  Quadriceps strength 3+ over 5 left    Special Tests:  Negative Homans sign left    Skin: There are no rashes, ulcerations or lesions. Gait: Patient ambulating without ambulatory aids    Reflex intact    Additional Comments:   Evaluation of her right knee does reveal only a trace knee joint effusion although she does have some patellofemoral and medial compartment crepitation. She does exhibit markedly improved medial joint line tenderness with a now negative Natasha's without appreciable click. Substantial lateral joint line tenderness with negative meniscal clicks. She does have reasonable range of motion with flexion to 125. I do not sense high-grade instability. She has fair Quad tone with strength being about 4-5 with flexion and extension. No evidence of obvious instability. She has a negative patellar grind test on the right with negative apprehension test.  Screening hip testing appears to be fairly benign. Evaluation of her right foot does reveal obvious hammer toeing 2-2 through 5. She does have some mild second through fifth MTP tenderness and mild tenderness over the midfoot and peroneal tendon. No Lisfranc tenderness or substantial ecchymosis or focal swelling. Right foot MTP grind test produces only mild discomfort currently and she does state that her pain symptoms with walking are very episodic. Additional Examinations:         Right Lower Extremity: Examination of the right lower extremity does not show any tenderness, deformity or injury. Range of motion is unremarkable. There is no gross instability. There are no rashes, ulcerations or lesions.   Strength and tone are normal.  Left Lower Extremity: Examination of the left lower extremity does not show any tenderness, deformity or injury. Range of motion is unremarkable. There is no gross instability. There are no rashes, ulcerations or lesions. Strength and tone are normal.    Radiology:     X-rays obtained and reviewed in office 10/21/2020  Views standing AP, PA, lateral and sunrise  Location bilateral knee  Impression moderate advanced patellofemoral primary osteoarthritis with intermediate medial compartment narrowing left slightly worse than right      Right foot AP lateral bleak films were reviewed from 1/23/2022 and does show some mild midfoot osteoarthritic change and obvious hammertoe in toes 2 through 4 but no evidence of subacute fracturing. She does have inferior calcaneal spurring with only minimal tenderness over the plantar fascial insertion over the medial calcaneal tubercle    Assessment :  #1. Recurrent symptomatic moderate to significant medial compartment osteoarthritis with significant grade 4 patellofemoral arthropathy and improved recurrent synovitis with bilateral knee Orthovisc No. 1  #2. Nearly 2 months status post right forefoot contusion with hammer toeing toes 2 through 5 with episodic MTP synovitis with associated right midfoot osteoarthritis and low-grade peroneal tendinitis pes cavus overpronation pending podiatry evaluation 1/28/2022    Impression:  Encounter Diagnoses   Name Primary?  Primary osteoarthritis of both knees Yes    Chondromalacia of both patellae     Chronic pain of left knee     Chronic pain of right knee        Office Procedures:  Orders Placed This Encounter   Procedures    20610 - [place] DRAIN/INJECT LARGE JOINT/BURSA       Treatment Plan:  Treatment options were discussed with Verito Stock today. We did review her bilateral knee weightbearing plain films as well as her previous MRI of her left knee and treatments thus far.   Overall her symptoms were doing much better following her last round of viscosupplementation with Euflexxa to her knees bilaterally on 7/12/2021. Over the last several weeks she has noticed increasing pain and disability with pain going up to about a 3-6 out of 10 with distance walking and in particular with stair climbing. Clinically at this point she is doing reasonably well and is able to get by with just taking Aleve on a very episodic basis as opposed to her meloxicam 7.5 mg daily. He also does have Voltaren gel occasionally. She was strong encouraged to continue with her patellar protection program.  She may utilize her wraparound knee brace. After discussion of pros and cons of Visco supplementation, she did receive her first injection of Orthovisc to her knees bilaterally. This was performed using the standard prefilled 2 cc syringe. She will be seen back each in the next 2 weeks to finish up her viscosupplementation series and she will contact us in the interim with questions or concerns. Regard to her right foot I think we are dealing with a subacute contusion with MTP synovitis and mild aggravation to her midfoot osteoarthritis and low-grade peroneal tendinitis although she does have hammertoe in 2 through 4. Her x-rays did not show subacute fracturing and she was encouraged to keep her appointment with podiatrist as she may be a good candidate for orthotics with metatarsal barring in therapy. We did put her plain films recently of her foot on disc for her. She will be seen back each next 2 weeks to continue with her Orthovisc injections to her knees bilaterally.   She will contact us in interim with questions or concerns

## 2022-02-02 ENCOUNTER — OFFICE VISIT (OUTPATIENT)
Dept: ORTHOPEDIC SURGERY | Age: 82
End: 2022-02-02
Payer: MEDICARE

## 2022-02-02 DIAGNOSIS — M25.561 CHRONIC PAIN OF RIGHT KNEE: ICD-10-CM

## 2022-02-02 DIAGNOSIS — M22.42 CHONDROMALACIA OF BOTH PATELLAE: ICD-10-CM

## 2022-02-02 DIAGNOSIS — G89.29 CHRONIC PAIN OF LEFT KNEE: ICD-10-CM

## 2022-02-02 DIAGNOSIS — G89.29 CHRONIC PAIN OF RIGHT KNEE: ICD-10-CM

## 2022-02-02 DIAGNOSIS — M25.562 CHRONIC PAIN OF LEFT KNEE: ICD-10-CM

## 2022-02-02 DIAGNOSIS — M22.41 CHONDROMALACIA OF BOTH PATELLAE: ICD-10-CM

## 2022-02-02 DIAGNOSIS — M17.0 PRIMARY OSTEOARTHRITIS OF BOTH KNEES: Primary | ICD-10-CM

## 2022-02-02 PROCEDURE — 20610 DRAIN/INJ JOINT/BURSA W/O US: CPT | Performed by: FAMILY MEDICINE

## 2022-02-02 NOTE — PROGRESS NOTES
CC:  FU Knee Osteoarthritis with Viscosupplementation       HPI: History of Present Illness:    Jalen Mustafa was last seen in the office on 7/12/2021 we completed viscosupplementation with Visco 3 to her knees. She did reasonably well become a little more sore lately. She is trying to perform her exercise program and does take Aleve very episodically and also utilize her Voltaren gel on an episodic basis. There is no recent injury to her knee but she did have an episode in roughly Thanksgiving 2021 which a frozen bottle of water fell onto the top of her freezer striking the distal aspect of her right foot. She did have immediate pain and did develop some occultly with walking and had only mild swelling but no ecchymosis. She does have underlying hammer toeing to toes 2 through 4 believing that she had she sustained a contusion put off initial evaluation but due to persistence of pain, she was seen in after-hours on 1/21/2002 and subsequently in the emergency room on 1/23/2022 for episodic foot pain. Her x-rays are negative for subacute fracture at that point and did show hammer toeing. She was encouraged to utilize her Voltaren gel and seek follow-up with her podiatrist Dr. Dannie Soares and does have an appointment to see them on 1/28/2022. 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 She does not have supplemental orthotics. She does have episodic pain and while she does have some soreness over the heel she is also having some lateral ankle tenderness likely consistent with peroneal tendinitis. She is a type of rehabilitation. We last outlined in the office on 1/26/2022 and was started once again on Orthovisc to her knees bilaterally.  She presents back today stating that she is actually feeling better and has noticed a moderate improvement overall in her arthritic symptoms. He has been utilizing her Voltaren gel and tries to work on her stretches. Denies locking or catching. She does utilize her brace on occasion and has been using her cane. Denies true locking catching or instability symptoms would like to put off surgical intervention as long she can. She is here today for her second Orthovisc injection is typically done well with viscosupplementation in the past.      PE: no substantial change in exam.    Assessment:  Knee Osteoarthritis with viscosupplementation      PROCEDURE NOTE:    PRE-PROCEDURE DIAGNOSIS: DJD knee    POST-PROCEDURE DIAGNOSIS: DJD knee    Injection #2 of Orthovisc bilaterally. PROCEDURE:  With the patient's permission, her bilaterally knee was prepped in standard sterile fashion with Betadine and Alcohol and the prefilled 2cc injection of Orthovisc was injected intra-articularly into the bilaterally knee via a superolateral approach without difficulty. The patient tolerated this well without difficulty. A band-aid was applied. POST-PROCEDURE INSTRUCTIONS GIVEN TO PATIENT: The patient was advised to ice the knee for 15-20 minutes to relieve any injection site related pain. FOLLOW-UP: as directed. We will continue with her Voltaren gel although she occasionally does take an Aleve. She will work on her exercise program continue with her bracing and use her cane as necessary. She will be seen back next week to finish up her Euflexxa series is to be done well with this.  She would like to avoid surgery as long she can

## 2022-02-09 ENCOUNTER — OFFICE VISIT (OUTPATIENT)
Dept: ORTHOPEDIC SURGERY | Age: 82
End: 2022-02-09
Payer: MEDICARE

## 2022-02-09 DIAGNOSIS — G89.29 CHRONIC PAIN OF RIGHT KNEE: ICD-10-CM

## 2022-02-09 DIAGNOSIS — M22.41 CHONDROMALACIA OF BOTH PATELLAE: ICD-10-CM

## 2022-02-09 DIAGNOSIS — M17.0 PRIMARY OSTEOARTHRITIS OF BOTH KNEES: Primary | ICD-10-CM

## 2022-02-09 DIAGNOSIS — M22.42 CHONDROMALACIA OF BOTH PATELLAE: ICD-10-CM

## 2022-02-09 DIAGNOSIS — M25.561 CHRONIC PAIN OF RIGHT KNEE: ICD-10-CM

## 2022-02-09 DIAGNOSIS — G89.29 CHRONIC PAIN OF LEFT KNEE: ICD-10-CM

## 2022-02-09 DIAGNOSIS — M25.562 CHRONIC PAIN OF LEFT KNEE: ICD-10-CM

## 2022-02-09 PROCEDURE — 20610 DRAIN/INJ JOINT/BURSA W/O US: CPT | Performed by: FAMILY MEDICINE

## 2022-02-09 RX ORDER — MELOXICAM 15 MG/1
15 TABLET ORAL DAILY
Qty: 30 TABLET | Refills: 3 | Status: SHIPPED | OUTPATIENT
Start: 2022-02-09 | End: 2022-09-24 | Stop reason: SDUPTHER

## 2022-02-09 RX ORDER — MELOXICAM 15 MG/1
15 TABLET ORAL DAILY
Qty: 30 TABLET | Refills: 3 | Status: SHIPPED
Start: 2022-02-09 | End: 2022-02-09 | Stop reason: CLARIF

## 2022-02-09 NOTE — PROGRESS NOTES
CC:  FU Knee Osteoarthritis with Viscosupplementation       HPI: History of Present Illness:    Dedrick Gómez was last seen in the office on 7/12/2021 we completed viscosupplementation with Visco 3 to her knees. She did reasonably well become a little more sore lately. She is trying to perform her exercise program and does take Aleve very episodically and also utilize her Voltaren gel on an episodic basis. There is no recent injury to her knee but she did have an episode in roughly Thanksgiving 2021 which a frozen bottle of water fell onto the top of her freezer striking the distal aspect of her right foot. She did have immediate pain and did develop some occultly with walking and had only mild swelling but no ecchymosis. She does have underlying hammer toeing to toes 2 through 4 believing that she had she sustained a contusion put off initial evaluation but due to persistence of pain, she was seen in after-hours on 1/21/2002 and subsequently in the emergency room on 1/23/2022 for episodic foot pain. Her x-rays are negative for subacute fracture at that point and did show hammer toeing. She was encouraged to utilize her Voltaren gel and seek follow-up with her podiatrist Dr. Anni Napier and does have an appointment to see them on 1/28/2022. 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 She does not have supplemental orthotics. She does have episodic pain and while she does have some soreness over the heel she is also having some lateral ankle tenderness likely consistent with peroneal tendinitis. She is a type of rehabilitation. We last saw Dedrick Gómez in the office on 1/26/2022 and was started once again on Orthovisc to her knees bilaterally.  She presents back today stating that she is actually feeling better and has noticed a moderate improvement overall in her arthritic symptoms. He has been utilizing her Voltaren gel and tries to work on her stretches. Denies locking or catching. She does utilize her brace on occasion and has been using her cane. Denies true locking catching or instability symptoms would like to put off surgical intervention as long she can. She is here today for her second Orthovisc injection is typically done well with viscosupplementation in the past.    Aneta Paget was last seen in the office on 2/2/2022 and was continued on her previous injections for her underlying knee osteoarthritis. Overall she is doing somewhat better is been a little bit sore with the cold weather. She admits she has not been walking as much as she should and does occasionally use her Voltaren gel but has been alternating between ibuprofen and Aleve. She does utilize her brace at time and has been utilizing her cane but denies locking catching or true instability symptoms. She describes her current symptoms as more irritating as opposed to highly limiting and would like to be more active. Denies locking catching or true instability symptoms. PE: no substantial change in exam.    Assessment:  Knee Osteoarthritis with viscosupplementation      PROCEDURE NOTE:    PRE-PROCEDURE DIAGNOSIS: DJD knee    POST-PROCEDURE DIAGNOSIS: DJD knee    Injection #3 of Orthovisc bilaterally. PROCEDURE:  With the patient's permission, her bilaterally knee was prepped in standard sterile fashion with Betadine and Alcohol and the prefilled 2cc injection of Orthovisc was injected intra-articularly into the bilaterally knee via a superolateral approach without difficulty. The patient tolerated this well without difficulty. A band-aid was applied. POST-PROCEDURE INSTRUCTIONS GIVEN TO PATIENT: The patient was advised to ice the knee for 15-20 minutes to relieve any injection site related pain. FOLLOW-UP: as directed.   After discussing medication options, we did an filling her meloxicam 15 mg daily and she was strongly encouraged to resume her walking program when the weather allows and to work on her home-based exercise program with periodic use of her knee braces and cane. She is aware that she can have repeat viscosupplementation at 6-month intervals or interim steroid injections at 3 months if necessary. She would like to avoid knee arthroplasty as long she can will contact us in the interim with questions or concerns.

## 2022-05-11 ENCOUNTER — HOSPITAL ENCOUNTER (EMERGENCY)
Age: 82
Discharge: HOME | End: 2022-05-11
Attending: EMERGENCY MEDICINE
Payer: MEDICARE

## 2022-05-11 VITALS
HEART RATE: 72 BPM | OXYGEN SATURATION: 97 % | TEMPERATURE: 98.4 F | BODY MASS INDEX: 29.83 KG/M2 | RESPIRATION RATE: 20 BRPM | WEIGHT: 158 LBS | HEIGHT: 61 IN | DIASTOLIC BLOOD PRESSURE: 86 MMHG | SYSTOLIC BLOOD PRESSURE: 186 MMHG

## 2022-05-11 DIAGNOSIS — I10 ASYMPTOMATIC HYPERTENSION: Primary | ICD-10-CM

## 2022-05-11 LAB
A/G RATIO: 1.5 (ref 1.1–2.2)
ALBUMIN SERPL-MCNC: 4.7 G/DL (ref 3.4–5)
ALP BLD-CCNC: 99 U/L (ref 40–129)
ALT SERPL-CCNC: 18 U/L (ref 10–40)
ANION GAP SERPL CALCULATED.3IONS-SCNC: 11 MMOL/L (ref 3–16)
AST SERPL-CCNC: 20 U/L (ref 15–37)
BASOPHILS ABSOLUTE: 0.1 K/UL (ref 0–0.2)
BASOPHILS RELATIVE PERCENT: 0.7 %
BILIRUB SERPL-MCNC: 0.5 MG/DL (ref 0–1)
BUN BLDV-MCNC: 17 MG/DL (ref 7–20)
CALCIUM SERPL-MCNC: 9.6 MG/DL (ref 8.3–10.6)
CHLORIDE BLD-SCNC: 100 MMOL/L (ref 99–110)
CO2: 26 MMOL/L (ref 21–32)
CREAT SERPL-MCNC: 0.9 MG/DL (ref 0.6–1.2)
EOSINOPHILS ABSOLUTE: 0.1 K/UL (ref 0–0.6)
EOSINOPHILS RELATIVE PERCENT: 0.7 %
GFR AFRICAN AMERICAN: >60
GFR NON-AFRICAN AMERICAN: >60
GLUCOSE BLD-MCNC: 103 MG/DL (ref 70–99)
HCT VFR BLD CALC: 40.5 % (ref 36–48)
HEMOGLOBIN: 13.4 G/DL (ref 12–16)
LYMPHOCYTES ABSOLUTE: 2.2 K/UL (ref 1–5.1)
LYMPHOCYTES RELATIVE PERCENT: 20.5 %
MCH RBC QN AUTO: 29.2 PG (ref 26–34)
MCHC RBC AUTO-ENTMCNC: 33 G/DL (ref 31–36)
MCV RBC AUTO: 88.5 FL (ref 80–100)
MONOCYTES ABSOLUTE: 1 K/UL (ref 0–1.3)
MONOCYTES RELATIVE PERCENT: 9.6 %
NEUTROPHILS ABSOLUTE: 7.4 K/UL (ref 1.7–7.7)
NEUTROPHILS RELATIVE PERCENT: 68.5 %
PDW BLD-RTO: 15.3 % (ref 12.4–15.4)
PLATELET # BLD: 276 K/UL (ref 135–450)
PMV BLD AUTO: 7 FL (ref 5–10.5)
POTASSIUM SERPL-SCNC: 4.1 MMOL/L (ref 3.5–5.1)
RBC # BLD: 4.58 M/UL (ref 4–5.2)
SODIUM BLD-SCNC: 137 MMOL/L (ref 136–145)
TOTAL PROTEIN: 7.9 G/DL (ref 6.4–8.2)
TROPONIN: <0.01 NG/ML
WBC # BLD: 10.8 K/UL (ref 4–11)

## 2022-05-11 PROCEDURE — 99284 EMERGENCY DEPT VISIT MOD MDM: CPT

## 2022-05-11 PROCEDURE — 84484 ASSAY OF TROPONIN QUANT: CPT

## 2022-05-11 PROCEDURE — 85025 COMPLETE CBC W/AUTO DIFF WBC: CPT

## 2022-05-11 PROCEDURE — 80053 COMPREHEN METABOLIC PANEL: CPT

## 2022-05-11 PROCEDURE — 93005 ELECTROCARDIOGRAM TRACING: CPT | Performed by: EMERGENCY MEDICINE

## 2022-05-11 ASSESSMENT — ENCOUNTER SYMPTOMS
STRIDOR: 0
SHORTNESS OF BREATH: 0
WHEEZING: 0
VOMITING: 0
VOICE CHANGE: 0
ABDOMINAL PAIN: 0
FACIAL SWELLING: 0
NAUSEA: 0
TROUBLE SWALLOWING: 0
COLOR CHANGE: 0

## 2022-05-11 ASSESSMENT — PAIN SCALES - GENERAL: PAINLEVEL_OUTOF10: 0

## 2022-05-11 ASSESSMENT — PAIN - FUNCTIONAL ASSESSMENT: PAIN_FUNCTIONAL_ASSESSMENT: 0-10

## 2022-05-12 LAB
EKG ATRIAL RATE: 80 BPM
EKG DIAGNOSIS: NORMAL
EKG P AXIS: 62 DEGREES
EKG P-R INTERVAL: 170 MS
EKG Q-T INTERVAL: 370 MS
EKG QRS DURATION: 72 MS
EKG QTC CALCULATION (BAZETT): 426 MS
EKG R AXIS: 76 DEGREES
EKG T AXIS: -63 DEGREES
EKG VENTRICULAR RATE: 80 BPM

## 2022-05-12 PROCEDURE — 93010 ELECTROCARDIOGRAM REPORT: CPT | Performed by: INTERNAL MEDICINE

## 2022-05-12 NOTE — CONSULTS
Placed call to pt's PCP @ 2055  RE: Mat Speed.  HTN per MD Dr. Elizabeth Simms Courser called bacck @ 2106

## 2022-05-12 NOTE — ED PROVIDER NOTES
[address] [facility]  ED  eMERGENCY dEPARTMENT eNCOUnter      Pt Name: Romayne Sloan  MRN: [id number]  Quintengfdaysi 1940  Date of evaluation: 5/11/2022  Provider: Lennox Ovens, MD    CHIEF COMPLAINT       Chief Complaint   Patient presents with    Hypertension     sent by PCP for htn; denies symptoms; denies pain;          HISTORY OF PRESENT ILLNESS   (Location/Symptom, Timing/Onset, Context/Setting, Quality, Duration, Modifying Factors, Severity)  Note limiting factors. Romayne Sloan is a 80 y.o. female with history of hypertension who was previously on amlodipine but 2 months ago had amlodipine discontinued due to mild leg swelling as well as uncontrolled hypertension and had switched to irbesartan who presents for approximately 1 week of uncontrolled blood pressures. Patient denies any symptoms specifically denying any headache shortness of breath chest pain focal weaknesses numbness or weakness in arms or legs. She reports her blood pressure has been as high as 200/100 at home. She reports she called her primary care doctor's office and did not speak to a doctor but spoke to  to advise she go to the ER. Patient reports she is completely asymptomatic at this time. HPI    Nursing Notes were reviewed. REVIEW OFSYSTEMS    (2-9 systems for level 4, 10 or more for level 5)     Review of Systems   Constitutional: Negative for appetite change, fever and unexpected weight change. HENT: Negative for facial swelling, trouble swallowing and voice change. Eyes: Negative for visual disturbance. Respiratory: Negative for shortness of breath, wheezing and stridor. Cardiovascular: Negative for chest pain and palpitations. Gastrointestinal: Negative for abdominal pain, nausea and vomiting. Genitourinary: Negative for dysuria and vaginal bleeding. Musculoskeletal: Negative for neck pain and neck stiffness. Skin: Negative for color change and wound.    Neurological: Negative for seizures and syncope. Psychiatric/Behavioral: Negative for self-injury and suicidal ideas. Except as noted above the remainder of the review of systems was reviewed and negative. 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 sugery for leaking fluid at same time of cochlear implant         SURGICAL HISTORY       Past Surgical History:   Procedure Laterality Date    APPENDECTOMY      APPENDECTOMY      CARDIAC CATHETERIZATION  2004    COCHLEAR IMPLANT  2012    COLONOSCOPY  11/16/2017    polyp    HYSTERECTOMY  1994    LUNG SURGERY      Left lung complete removal    SHOULDER SURGERY      right shoulder- no block given due to only having one lung    SHOULDER SURGERY Left     video arthroscopy, rotator cuff repair, biceps tenodesis         CURRENT MEDICATIONS       Previous Medications    ALBUTEROL (PROVENTIL HFA) 108 (90 BASE) MCG/ACT INHALER    Inhale 2 puffs into the lungs every 6 hours as needed for Wheezing. AMLODIPINE (NORVASC) 10 MG TABLET    Take  by mouth daily. Verify dose from H & P    BOSWELLIA-GLUCOSAMINE-VIT D (OSTEO BI-FLEX ONE PER DAY PO)    Take by mouth    FLUTICASONE (FLONASE) 50 MCG/ACT NASAL SPRAY    USE 2 SPRAYS NASALLY DAILY    LORATADINE (CLARITIN) 10 MG CAPS    Take  by mouth.  Clarify dose    MELOXICAM (MOBIC) 15 MG TABLET    Take 1 tablet by mouth daily    MELOXICAM (MOBIC) 7.5 MG TABLET    Take 1 tablet by mouth daily    MOMETASONE-FORMOTEROL (DULERA) 200-5 MCG/ACT INHALER    Inhale 2 puffs into the lungs    NABUMETONE (RELAFEN) 500 MG TABLET    Take 1 tablet by mouth 2 times daily    NONFORMULARY    AREDS eye vitamins       ALLERGIES     Latex, Morphine and related, Percocet [oxycodone-acetaminophen], Codeine, Tetracycline, Tetracyclines & related, and Morphine    FAMILY HISTORY       Family History   Problem Relation Age of Onset    High Blood Pressure Father     Migraines Father     Heart Attack Father         cause death    Breast Cancer Sister           SOCIAL HISTORY       Social History     Socioeconomic History    Marital status:      Spouse name: None    Number of children: None    Years of education: None    Highest education level: None   Occupational History    None   Tobacco Use    Smoking status: Never Smoker    Smokeless tobacco: Never Used   Substance and Sexual Activity    Alcohol use: No    Drug use: No    Sexual activity: None   Other Topics Concern    None   Social History Narrative    None     Social Determinants of Health     Financial Resource Strain:     Difficulty of Paying Living Expenses: Not on file   Food Insecurity:     Worried About Running Out of Food in the Last Year: Not on file    [place]ez of Food in the Last Year: Not on file   Transportation Needs:     Lack of Transportation (Medical): Not on file    Lack of Transportation (Non-Medical):  Not on file   Physical Activity:     Days of Exercise per Week: Not on file    Minutes of Exercise per Session: Not on file   Stress:     Feeling of Stress : Not on file   Social Connections:     Frequency of Communication with Friends and Family: Not on file    Frequency of Social Gatherings with Friends and Family: Not on file    Attends Gnosticism Services: Not on file    Active Member of Clubs or Organizations: Not on file    Attends Club or Organization Meetings: Not on file    Marital Status: Not on file   Intimate Partner Violence:     Fear of Current or Ex-Partner: Not on file    Emotionally Abused: Not on file    Physically Abused: Not on file    Sexually Abused: Not on file   Housing Stability:     Unable to Pay for Housing in the Last Year: Not on file    Number of Places Lived in the Last Year: Not on file    Unstable Housing in the Last Year: Not on file         PHYSICAL EXAM    (up to 7 for level 4, 8 or more for level 5)     ED Triage Vitals [05/11/22 1859]   BP Temp Temp Source Pulse Resp SpO2 Height Weight   (!) 187/96 98.4 °F (36.9 °C) Oral 80 22 96 % 5' 1\" (1.549 m) 158 lb (71.7 kg)       Physical Exam  Vitals and nursing note reviewed. Constitutional:       Appearance: She is well-developed. She is not diaphoretic. HENT:      Head: Normocephalic and atraumatic. Right Ear: External ear normal.      Left Ear: External ear normal.   Eyes:      Conjunctiva/sclera: Conjunctivae normal.   Neck:      Vascular: No JVD. Trachea: No tracheal deviation. Cardiovascular:      Rate and Rhythm: Normal rate. Pulmonary:      Effort: Pulmonary effort is normal. No respiratory distress. Breath sounds: Normal breath sounds. No wheezing. Abdominal:      General: There is no distension. Palpations: Abdomen is soft. Tenderness: There is no abdominal tenderness. There is no guarding or rebound. Musculoskeletal:         General: No tenderness or deformity. Normal range of motion. Cervical back: Neck supple. Skin:     General: Skin is warm and dry. Neurological:      Mental Status: She is alert. Cranial Nerves: No cranial nerve deficit. Comments: Normal mental status. Normal speech. Symmetric smile. Sensation intact in all CMV distributions of the face bilaterally. Strength and sensation intact in all 4 extremities. DIAGNOSTIC RESULTS     EKG:All EKG's are interpreted by the Emergency Department Physician who either signs or Co-signs this chart in the absence of a cardiologist.    The Ekg interpreted by me shows  normal sinus rhythm with a rate of 80  Axis is   Normal  QTc is  426ms  Intervals and Durations are unremarkable.       ST Segments: nonspecific changes  Nonspecific changes and T waves in lateral leads have now become inverted T waves in lateral leads compared to previous EKG on 8/30/2017      LABS:  Labs Reviewed   COMPREHENSIVE METABOLIC PANEL - Abnormal; Notable for the following components:       Result Value    Glucose 103 (*)     All other components within normal limits   CBC WITH AUTO DIFFERENTIAL   TROPONIN       All otherlabs were within normal range or not returned as of this dictation. EMERGENCY DEPARTMENT COURSE and DIFFERENTIAL DIAGNOSIS/MDM:   Vitals:    Vitals:    05/11/22 1859 05/11/22 2012 05/11/22 2030   BP: (!) 187/96  (!) 186/86   Pulse: 80  72   Resp: 22  20   Temp: 98.4 °F (36.9 °C) 98.4 °F (36.9 °C)    TempSrc: Oral Oral    SpO2: 96%  97%   Weight: 158 lb (71.7 kg) 158 lb (71.7 kg)    Height: 5' 1\" (1.549 m) 5' 1\" (1.549 m)          MDM  Patient is asymptomatic. Patient is hypertensive to 186/86 however without any intervention does not does reduce to 160/80. EKG shows nonspecific changes of nonspecific T wave changes in lateral leads becoming inverted T waves. Troponin is undetectable. The case is discussed with the patient as well as the physician on-call for her primary care office. They recommend the patient restart amlodipine in addition to her current irbesatan and they will follow her up tomorrow. Strict ER return precautions given for any chest pain shortness of breath or stroke symptoms. The patient and her son expressed understanding and agreement with this plan and the patient is discharged home. CONSULTS:  IP CONSULT TO PRIMARY CARE PROVIDER      Procedures    FINAL IMPRESSION      1.  Asymptomatic hypertension          DISPOSITION/PLAN   DISPOSITION Decision To Discharge 05/11/2022 09:20:45 PM      PATIENT REFERRED TO:  Colton Castleman, MD [place] [address] [place] [address]  [phone]    In 1 day      [facility]  ED  [address]  [address] [place] [address] [id number] [phone]    If symptoms worsen      (Please note that portions of this note were completed with a voice recognition program.  Efforts were made to edit the dictations but occasionally words aremis-transcribed. )    Deidre Akins MD (electronically signed)  Attending Emergency Physician           Deidre Akins MD  05/11/22 0990 Normal

## 2022-07-11 DIAGNOSIS — M17.0 PRIMARY OSTEOARTHRITIS OF BOTH KNEES: Primary | ICD-10-CM

## 2022-07-11 DIAGNOSIS — M25.561 CHRONIC PAIN OF RIGHT KNEE: ICD-10-CM

## 2022-07-11 DIAGNOSIS — M22.42 CHONDROMALACIA OF BOTH PATELLAE: ICD-10-CM

## 2022-07-11 DIAGNOSIS — G89.29 CHRONIC PAIN OF RIGHT KNEE: ICD-10-CM

## 2022-07-11 DIAGNOSIS — G89.29 CHRONIC PAIN OF LEFT KNEE: ICD-10-CM

## 2022-07-11 DIAGNOSIS — M25.562 CHRONIC PAIN OF LEFT KNEE: ICD-10-CM

## 2022-07-11 DIAGNOSIS — M22.41 CHONDROMALACIA OF BOTH PATELLAE: ICD-10-CM

## 2022-07-19 ENCOUNTER — TELEPHONE (OUTPATIENT)
Dept: ADMINISTRATIVE | Age: 82
End: 2022-07-19

## 2022-07-19 NOTE — TELEPHONE ENCOUNTER
The patient called in and would like to repeat visco injections. Patient reports a good response to the previous round and would like to repeat.

## 2022-08-06 ENCOUNTER — OFFICE VISIT (OUTPATIENT)
Dept: ORTHOPEDIC SURGERY | Age: 82
End: 2022-08-06
Payer: MEDICARE

## 2022-08-06 VITALS — WEIGHT: 158 LBS | BODY MASS INDEX: 29.83 KG/M2 | HEIGHT: 61 IN

## 2022-08-06 DIAGNOSIS — M25.521 RIGHT ELBOW PAIN: Primary | ICD-10-CM

## 2022-08-06 PROCEDURE — 99213 OFFICE O/P EST LOW 20 MIN: CPT | Performed by: PHYSICIAN ASSISTANT

## 2022-08-06 PROCEDURE — 1123F ACP DISCUSS/DSCN MKR DOCD: CPT | Performed by: PHYSICIAN ASSISTANT

## 2022-09-07 ENCOUNTER — OFFICE VISIT (OUTPATIENT)
Dept: ORTHOPEDIC SURGERY | Age: 82
End: 2022-09-07
Payer: MEDICARE

## 2022-09-07 DIAGNOSIS — G89.29 CHRONIC PAIN OF RIGHT KNEE: ICD-10-CM

## 2022-09-07 DIAGNOSIS — M22.42 CHONDROMALACIA OF BOTH PATELLAE: ICD-10-CM

## 2022-09-07 DIAGNOSIS — M17.0 PRIMARY OSTEOARTHRITIS OF BOTH KNEES: Primary | ICD-10-CM

## 2022-09-07 DIAGNOSIS — M25.562 CHRONIC PAIN OF LEFT KNEE: ICD-10-CM

## 2022-09-07 DIAGNOSIS — M25.561 CHRONIC PAIN OF RIGHT KNEE: ICD-10-CM

## 2022-09-07 DIAGNOSIS — G89.29 CHRONIC PAIN OF LEFT KNEE: ICD-10-CM

## 2022-09-07 DIAGNOSIS — M22.41 CHONDROMALACIA OF BOTH PATELLAE: ICD-10-CM

## 2022-09-07 PROCEDURE — 20610 DRAIN/INJ JOINT/BURSA W/O US: CPT | Performed by: FAMILY MEDICINE

## 2022-09-07 PROCEDURE — 99214 OFFICE O/P EST MOD 30 MIN: CPT | Performed by: FAMILY MEDICINE

## 2022-09-07 PROCEDURE — 1123F ACP DISCUSS/DSCN MKR DOCD: CPT | Performed by: FAMILY MEDICINE

## 2022-09-07 NOTE — PROGRESS NOTES
Chief Complaint    Knee Pain (ORTHOVISC#1 GABY KNEES)    Evaluation recurrent worsening right knee pain with known history of left knee multicompartment osteoarthritis status post completion of bilateral knee Euflexxa 7/12/2021    History of Present Illness:  Deni Jj is a [de-identified] y.o. female who is a very nice patient of Dr. Bob Vila and longstanding patient of Dr. Lock [place] is being seen today for evaluation of recurrent right greater than left knee pain. Once again she is known to have fairly substantial bilateral knee patellofemoral arthropathy with medial compartment osteoarthritis left greater than right but is more symptomatic on the right-hand side. She will last received her rounds of viscosupplementation her knees bilaterally on 3/9/2020 and was doing reasonably well up until early October 2020 when she began to notice a gradual onset of pain to the anterior medial portion of her knees. There is no history of actual injury or new activity prior to becoming symptomatic. She does complain of an achy pain which is fairly minor at rest at 1-2 out of 10 but when she is up and walking particular doing lots of stairs and distance walking or working out in the yard, her pain can be quite prominent at 5-6 out of 10. She feels as if her Euflexxa is beginning to wear out. She has been a little bit lax in performing her home-based exercise program and was able to wean off of her meloxicam when the Euflexxa was working properly for her. Denies true locking catching or instability symptoms. She is being seen today for orthopedic and sports consultation with updated imaging. We last saw Corie Tenorio in the office on 2/9/2022 when we finished up her Orthovisc injections to her knees bilaterally for underlying significant bilateral knee osteoarthritis with patellofemoral arthropathy.   She is done very well and just in the last few days she has had some recurrent achiness to the anterior and medial portion of her knees.  There is no interim history of injury fall or trauma. She would like to consider viscosupplementation once again as she has done reasonably well. She admits she has been a little lax in performing her home-based exercises. She has been using her Voltaren gel on an episodic basis but really is not requiring her bracing. She is also utilizing her meloxicam as well as her glucosamine. Medical History:  Patient's medications, allergies, past medical, surgical, social and family histories were reviewed and updated as appropriate. Review of Systems:  Pertinent items are noted in HPI  Review of systems reviewed from Patient History Form dated on 10/21/2020 and available in the patient's chart under the Media tab. Vital Signs: There were no vitals taken for this visit. General Exam:   Constitutional: Patient is adequately groomed with no evidence of malnutrition  Mental Status: The patient is oriented to time, place and person. The patient's mood and affect are appropriate. Lymphatic: The lymphatic examination bilaterally reveals all areas to be without enlargement or induration. Knee Examination:    Left Inspection: Does have a trace knee joint effusion without high-grade deformity knees bilaterally. She does have patellofemoral crepitation. Palpation:  Left knee reveals good ligamentous stability in all directions with negative Lachman, drawer, pivot shift and posterior drawer. Good medial and lateral stability at 0 and 30°. She has more moderate tenderness peripatellar and retropatellar medial and lateral facet. Negative apprehension sign. Minimal medial or lateral joint line tenderness and negative Natasha sign. No masses in the popliteal space. Range of Motion:  0-130° left    Strength:  Quadriceps strength 3+ over 5 left    Special Tests:  Negative Homans sign left    Skin: There are no rashes, ulcerations or lesions.     Gait: Patient ambulating without ambulatory aids    Reflex intact    Additional Comments:   Evaluation of her right knee does reveal only a trace knee joint effusion although she does have some patellofemoral and medial compartment crepitation. She does exhibit markedly improved medial joint line tenderness with a now negative Natasha's without appreciable click. Substantial lateral joint line tenderness with negative meniscal clicks. She does have reasonable range of motion with flexion to 125. I do not sense high-grade instability. She has fair Quad tone with strength being about 4-5 with flexion and extension. No evidence of obvious instability. She has a negative patellar grind test on the right with negative apprehension test.  Screening hip testing appears to be fairly benign. Evaluation of her right foot does reveal obvious hammer toeing 2-2 through 5. She does have some mild second through fifth MTP tenderness and mild tenderness over the midfoot and peroneal tendon. No Lisfranc tenderness or substantial ecchymosis or focal swelling. Right foot MTP grind test produces only mild discomfort currently and she does state that her pain symptoms with walking are very episodic. Additional Examinations:         Right Lower Extremity: Examination of the right lower extremity does not show any tenderness, deformity or injury. Range of motion is unremarkable. There is no gross instability. There are no rashes, ulcerations or lesions. Strength and tone are normal.  Left Lower Extremity: Examination of the left lower extremity does not show any tenderness, deformity or injury. Range of motion is unremarkable. There is no gross instability. There are no rashes, ulcerations or lesions.   Strength and tone are normal.    Radiology:     X-rays obtained and reviewed in office 10/21/2020  Views standing AP, PA, lateral and sunrise  Location bilateral knee  Impression moderate advanced patellofemoral primary osteoarthritis with intermediate medial compartment narrowing left slightly worse than right      Right foot AP lateral bleak films were reviewed from 1/23/2022 and does show some mild midfoot osteoarthritic change and obvious hammertoe in toes 2 through 4 but no evidence of subacute fracturing. She does have inferior calcaneal spurring with only minimal tenderness over the plantar fascial insertion over the medial calcaneal tubercle    Assessment :  #1. Recurrent symptomatic moderate to significant medial compartment osteoarthritis with significant grade 4 patellofemoral arthropathy and improved recurrent synovitis with bilateral knee Orthovisc No. 1  #2. History of right forefoot contusion with stable hammer toeing toes 2 through 5 with episodic MTP synovitis with associated right midfoot osteoarthritis and low-grade peroneal tendinitis pes cavus overpronation with podiatry evaluation 1/28/2022    Impression:  Encounter Diagnoses   Name Primary? Primary osteoarthritis of both knees Yes    Chondromalacia of both patellae     Chronic pain of right knee     Chronic pain of left knee        Office Procedures:  Orders Placed This Encounter   Procedures    20610 - [place] DRAIN/INJECT LARGE JOINT/BURSA       Treatment Plan:  Treatment options were discussed with Rina Pérez today. We did review her bilateral knee weightbearing plain films as well as her previous MRI of her left knee and treatments thus far. Overall her symptoms were doing much better following her last round of viscosupplementation with Euflexxa to her knees bilaterally on 2/9/2022. Over the last several weeks she has noticed increasing pain and disability with pain going up to about a 3-6 out of 10 with distance walking and in particular with stair climbing. Clinically at this point she is doing reasonably well and is able to get by with just taking Aleve on a very episodic basis as opposed to her meloxicam 7.5 mg daily. He also does have Voltaren gel occasionally.   She was strong encouraged to continue with her patellar protection program.  She may utilize her wraparound knee brace. After discussion of pros and cons of Visco supplementation, she did receive her first injection of Orthovisc to her knees bilaterally. This was performed using the standard prefilled 2 cc syringe. She will be seen back each in the next 2 weeks to finish up her viscosupplementation series and she will contact us in the interim with questions or concerns. She will be seen back each next 2 weeks to continue with her Orthovisc injections to her knees bilaterally.   She will contact us in interim with questions or concerns

## 2022-09-14 ENCOUNTER — OFFICE VISIT (OUTPATIENT)
Dept: ORTHOPEDIC SURGERY | Age: 82
End: 2022-09-14
Payer: MEDICARE

## 2022-09-14 VITALS — HEIGHT: 61 IN | WEIGHT: 158 LBS | BODY MASS INDEX: 29.83 KG/M2

## 2022-09-14 DIAGNOSIS — M17.0 PRIMARY OSTEOARTHRITIS OF BOTH KNEES: Primary | ICD-10-CM

## 2022-09-14 DIAGNOSIS — G89.29 CHRONIC PAIN OF RIGHT KNEE: ICD-10-CM

## 2022-09-14 DIAGNOSIS — M25.562 CHRONIC PAIN OF LEFT KNEE: ICD-10-CM

## 2022-09-14 DIAGNOSIS — M22.41 CHONDROMALACIA OF BOTH PATELLAE: ICD-10-CM

## 2022-09-14 DIAGNOSIS — M22.42 CHONDROMALACIA OF BOTH PATELLAE: ICD-10-CM

## 2022-09-14 DIAGNOSIS — M25.561 CHRONIC PAIN OF RIGHT KNEE: ICD-10-CM

## 2022-09-14 DIAGNOSIS — G89.29 CHRONIC PAIN OF LEFT KNEE: ICD-10-CM

## 2022-09-14 PROCEDURE — 99999 PR OFFICE/OUTPT VISIT,PROCEDURE ONLY: CPT | Performed by: FAMILY MEDICINE

## 2022-09-14 PROCEDURE — 20610 DRAIN/INJ JOINT/BURSA W/O US: CPT | Performed by: FAMILY MEDICINE

## 2022-09-14 NOTE — PROGRESS NOTES
CC:  FU Knee Osteoarthritis with Viscosupplementation       HPI: History of Present Illness:    Agus Chávez was last seen in the office on 7/12/2021 we completed viscosupplementation with Visco 3 to her knees. She did reasonably well become a little more sore lately. She is trying to perform her exercise program and does take Aleve very episodically and also utilize her Voltaren gel on an episodic basis. There is no recent injury to her knee but she did have an episode in roughly Thanksgiving 2021 which a frozen bottle of water fell onto the top of her freezer striking the distal aspect of her right foot. She did have immediate pain and did develop some occultly with walking and had only mild swelling but no ecchymosis. She does have underlying hammer toeing to toes 2 through 4 believing that she had she sustained a contusion put off initial evaluation but due to persistence of pain, she was seen in after-hours on 1/21/2002 and subsequently in the emergency room on 1/23/2022 for episodic foot pain. Her x-rays are negative for subacute fracture at that point and did show hammer toeing. She was encouraged to utilize her Voltaren gel and seek follow-up with her podiatrist Dr. Germán Perea and does have an appointment to see them on 1/28/2022. 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 She does not have supplemental orthotics. She does have episodic pain and while she does have some soreness over the heel she is also having some lateral ankle tenderness likely consistent with peroneal tendinitis. She is a type of rehabilitation. We last outlined in the office on 1/26/2022 and was started once again on Orthovisc to her knees bilaterally.  She presents back today stating that she is actually feeling better and has noticed a moderate improvement overall in her arthritic symptoms. He has been utilizing her Voltaren gel and tries to work on her stretches. Denies locking or catching. She does utilize her brace on occasion and has been using her cane. Denies true locking catching or instability symptoms would like to put off surgical intervention as long she can. She is here today for her second Orthovisc injection is typically done well with viscosupplementation in the past.    We last saw Raymundo Dixon in the office on 9/7/2022 and was started on Orthovisc once again for her knees bilaterally. She presents back today for that she is doing very well. She has been utilizing her Voltaren gel does work on her exercise program and tries to stretch. Denies locking or catching with occasional use of her knee brace and cane. She is here today for second Orthovisc injection which she is typically done well with and denies locking catching or true instability symptoms. She is hoping to put off knee arthroplasty as long as she can. PE: no substantial change in exam.    Assessment:  Knee Osteoarthritis with viscosupplementation      PROCEDURE NOTE:    PRE-PROCEDURE DIAGNOSIS: DJD knee    POST-PROCEDURE DIAGNOSIS: DJD knee    Injection #2 of Orthovisc bilaterally. PROCEDURE:  With the patient's permission, her bilaterally knee was prepped in standard sterile fashion with Betadine and Alcohol and the prefilled 2cc injection of Orthovisc was injected intra-articularly into the bilaterally knee via a superolateral approach without difficulty. The patient tolerated this well without difficulty. A band-aid was applied. POST-PROCEDURE INSTRUCTIONS GIVEN TO PATIENT: The patient was advised to ice the knee for 15-20 minutes to relieve any injection site related pain. FOLLOW-UP: as directed. We will continue with her Voltaren gel although she occasionally does take an Aleve. She will work on her exercise program continue with her bracing and use her cane as necessary. She will be seen back next week to finish up her Euflexxa series is to be done well with this.  She would like to avoid surgery as long she can

## 2022-09-20 ENCOUNTER — APPOINTMENT (OUTPATIENT)
Dept: GENERAL RADIOLOGY | Age: 82
DRG: 872 | End: 2022-09-20
Payer: MEDICARE

## 2022-09-20 ENCOUNTER — HOSPITAL ENCOUNTER (INPATIENT)
Age: 82
LOS: 4 days | Discharge: NURSING, REHAB OR LONG-TERM CARE | DRG: 872 | End: 2022-09-24
Attending: EMERGENCY MEDICINE | Admitting: INTERNAL MEDICINE
Payer: MEDICARE

## 2022-09-20 ENCOUNTER — TELEPHONE (OUTPATIENT)
Dept: ORTHOPEDIC SURGERY | Age: 82
End: 2022-09-20

## 2022-09-20 DIAGNOSIS — A68.9 RECURRENT FEVER: Primary | ICD-10-CM

## 2022-09-20 DIAGNOSIS — M11.20 PSEUDOGOUT: ICD-10-CM

## 2022-09-20 PROBLEM — A41.9 SEPSIS (HCC): Status: ACTIVE | Noted: 2022-09-20

## 2022-09-20 LAB
A/G RATIO: 1.7 (ref 1.1–2.2)
ALBUMIN SERPL-MCNC: 4.8 G/DL (ref 3.4–5)
ALP BLD-CCNC: 97 U/L (ref 40–129)
ALT SERPL-CCNC: 25 U/L (ref 10–40)
AMORPHOUS: NORMAL /HPF
ANION GAP SERPL CALCULATED.3IONS-SCNC: 15 MMOL/L (ref 3–16)
ANISOCYTOSIS: ABNORMAL
APPEARANCE FLUID: NORMAL
AST SERPL-CCNC: 23 U/L (ref 15–37)
ATYPICAL LYMPHOCYTE RELATIVE PERCENT: 1 % (ref 0–6)
BANDED NEUTROPHILS RELATIVE PERCENT: 13 % (ref 0–7)
BASOPHILS ABSOLUTE: 0.1 K/UL (ref 0–0.2)
BASOPHILS RELATIVE PERCENT: 1 %
BILIRUB SERPL-MCNC: 0.9 MG/DL (ref 0–1)
BILIRUBIN URINE: NEGATIVE
BLOOD, URINE: ABNORMAL
BUN BLDV-MCNC: 20 MG/DL (ref 7–20)
C-REACTIVE PROTEIN: 139.9 MG/L (ref 0–5.1)
CALCIUM SERPL-MCNC: 9.8 MG/DL (ref 8.3–10.6)
CELL COUNT FLUID TYPE: NORMAL
CHLORIDE BLD-SCNC: 89 MMOL/L (ref 99–110)
CLARITY: CLEAR
CLOT EVALUATION: NORMAL
CO2: 26 MMOL/L (ref 21–32)
COLOR FLUID: YELLOW
COLOR: YELLOW
CREAT SERPL-MCNC: 1 MG/DL (ref 0.6–1.2)
CRYSTAL CMT 2: NORMAL
CRYSTALS, FLUID: NORMAL
EOSINOPHILS ABSOLUTE: 0 K/UL (ref 0–0.6)
EOSINOPHILS RELATIVE PERCENT: 0 %
EPITHELIAL CELLS, UA: NORMAL /HPF (ref 0–5)
GFR AFRICAN AMERICAN: >60
GFR NON-AFRICAN AMERICAN: 53
GLUCOSE BLD-MCNC: 109 MG/DL (ref 70–99)
GLUCOSE URINE: NEGATIVE MG/DL
HCT VFR BLD CALC: 43.2 % (ref 36–48)
HEMOGLOBIN: 14.6 G/DL (ref 12–16)
INFLUENZA A: NOT DETECTED
INFLUENZA B: NOT DETECTED
KETONES, URINE: ABNORMAL MG/DL
LACTIC ACID: 1.2 MMOL/L (ref 0.4–2)
LEUKOCYTE ESTERASE, URINE: NEGATIVE
LYMPHOCYTES ABSOLUTE: 1.2 K/UL (ref 1–5.1)
LYMPHOCYTES RELATIVE PERCENT: 8 %
MACROCYTES: ABNORMAL
MACROPHAGE FLUID: 3 %
MCH RBC QN AUTO: 29.4 PG (ref 26–34)
MCHC RBC AUTO-ENTMCNC: 33.9 G/DL (ref 31–36)
MCV RBC AUTO: 86.9 FL (ref 80–100)
MICROSCOPIC EXAMINATION: YES
MONOCYTES ABSOLUTE: 1.4 K/UL (ref 0–1.3)
MONOCYTES RELATIVE PERCENT: 10 %
NEUTROPHIL, FLUID: 97 %
NEUTROPHILS ABSOLUTE: 11 K/UL (ref 1.7–7.7)
NEUTROPHILS RELATIVE PERCENT: 67 %
NITRITE, URINE: NEGATIVE
NUCLEATED CELLS FLUID: NORMAL /CUMM
NUMBER OF CELLS COUNTED FLUID: 100
PATH CONSULT FLUID: YES
PDW BLD-RTO: 15.2 % (ref 12.4–15.4)
PH UA: 6 (ref 5–8)
PLATELET # BLD: 331 K/UL (ref 135–450)
PLATELET SLIDE REVIEW: ADEQUATE
PMV BLD AUTO: 6.7 FL (ref 5–10.5)
POIKILOCYTES: ABNORMAL
POLYCHROMASIA: ABNORMAL
POTASSIUM SERPL-SCNC: 4.7 MMOL/L (ref 3.5–5.1)
PROCALCITONIN: 0.2 NG/ML (ref 0–0.15)
PROTEIN UA: NEGATIVE MG/DL
RBC # BLD: 4.97 M/UL (ref 4–5.2)
RBC FLUID: 150 /CUMM
RBC UA: NORMAL /HPF (ref 0–4)
RENAL EPITHELIAL, UA: NORMAL /HPF (ref 0–1)
SARS-COV-2 RNA, RT PCR: NOT DETECTED
SEDIMENTATION RATE, ERYTHROCYTE: 51 MM/HR (ref 0–30)
SLIDE REVIEW: ABNORMAL
SODIUM BLD-SCNC: 130 MMOL/L (ref 136–145)
SOURCE BODY FLUID: NORMAL
SPECIFIC GRAVITY UA: 1.01 (ref 1–1.03)
SPECIMEN STATUS: NORMAL
TOTAL PROTEIN: 7.7 G/DL (ref 6.4–8.2)
URINE REFLEX TO CULTURE: ABNORMAL
URINE TYPE: ABNORMAL
UROBILINOGEN, URINE: 0.2 E.U./DL
WBC # BLD: 13.7 K/UL (ref 4–11)
WBC UA: NORMAL /HPF (ref 0–5)

## 2022-09-20 PROCEDURE — 89051 BODY FLUID CELL COUNT: CPT

## 2022-09-20 PROCEDURE — 73610 X-RAY EXAM OF ANKLE: CPT

## 2022-09-20 PROCEDURE — 96365 THER/PROPH/DIAG IV INF INIT: CPT

## 2022-09-20 PROCEDURE — 0S9G3ZZ DRAINAGE OF LEFT ANKLE JOINT, PERCUTANEOUS APPROACH: ICD-10-PCS | Performed by: EMERGENCY MEDICINE

## 2022-09-20 PROCEDURE — 84300 ASSAY OF URINE SODIUM: CPT

## 2022-09-20 PROCEDURE — G0378 HOSPITAL OBSERVATION PER HR: HCPCS

## 2022-09-20 PROCEDURE — 1200000000 HC SEMI PRIVATE

## 2022-09-20 PROCEDURE — 83605 ASSAY OF LACTIC ACID: CPT

## 2022-09-20 PROCEDURE — 87040 BLOOD CULTURE FOR BACTERIA: CPT

## 2022-09-20 PROCEDURE — 85025 COMPLETE CBC W/AUTO DIFF WBC: CPT

## 2022-09-20 PROCEDURE — 87070 CULTURE OTHR SPECIMN AEROBIC: CPT

## 2022-09-20 PROCEDURE — 71045 X-RAY EXAM CHEST 1 VIEW: CPT

## 2022-09-20 PROCEDURE — 99285 EMERGENCY DEPT VISIT HI MDM: CPT

## 2022-09-20 PROCEDURE — 84145 PROCALCITONIN (PCT): CPT

## 2022-09-20 PROCEDURE — 81001 URINALYSIS AUTO W/SCOPE: CPT

## 2022-09-20 PROCEDURE — 6360000002 HC RX W HCPCS: Performed by: EMERGENCY MEDICINE

## 2022-09-20 PROCEDURE — 83935 ASSAY OF URINE OSMOLALITY: CPT

## 2022-09-20 PROCEDURE — 87205 SMEAR GRAM STAIN: CPT

## 2022-09-20 PROCEDURE — 82570 ASSAY OF URINE CREATININE: CPT

## 2022-09-20 PROCEDURE — 2580000003 HC RX 258: Performed by: EMERGENCY MEDICINE

## 2022-09-20 PROCEDURE — 87636 SARSCOV2 & INF A&B AMP PRB: CPT

## 2022-09-20 PROCEDURE — 89060 EXAM SYNOVIAL FLUID CRYSTALS: CPT

## 2022-09-20 PROCEDURE — 86140 C-REACTIVE PROTEIN: CPT

## 2022-09-20 PROCEDURE — 85652 RBC SED RATE AUTOMATED: CPT

## 2022-09-20 PROCEDURE — 80053 COMPREHEN METABOLIC PANEL: CPT

## 2022-09-20 RX ORDER — 0.9 % SODIUM CHLORIDE 0.9 %
1000 INTRAVENOUS SOLUTION INTRAVENOUS ONCE
Status: COMPLETED | OUTPATIENT
Start: 2022-09-20 | End: 2022-09-20

## 2022-09-20 RX ORDER — ONDANSETRON 2 MG/ML
4 INJECTION INTRAMUSCULAR; INTRAVENOUS EVERY 6 HOURS PRN
Status: DISCONTINUED | OUTPATIENT
Start: 2022-09-20 | End: 2022-09-24 | Stop reason: HOSPADM

## 2022-09-20 RX ORDER — POTASSIUM CHLORIDE 7.45 MG/ML
10 INJECTION INTRAVENOUS PRN
Status: DISCONTINUED | OUTPATIENT
Start: 2022-09-20 | End: 2022-09-21

## 2022-09-20 RX ORDER — SODIUM CHLORIDE 0.9 % (FLUSH) 0.9 %
10 SYRINGE (ML) INJECTION PRN
Status: DISCONTINUED | OUTPATIENT
Start: 2022-09-20 | End: 2022-09-24 | Stop reason: HOSPADM

## 2022-09-20 RX ORDER — ACETAMINOPHEN 650 MG/1
650 SUPPOSITORY RECTAL EVERY 6 HOURS PRN
Status: DISCONTINUED | OUTPATIENT
Start: 2022-09-20 | End: 2022-09-24 | Stop reason: HOSPADM

## 2022-09-20 RX ORDER — MAGNESIUM SULFATE IN WATER 40 MG/ML
2000 INJECTION, SOLUTION INTRAVENOUS PRN
Status: DISCONTINUED | OUTPATIENT
Start: 2022-09-20 | End: 2022-09-21

## 2022-09-20 RX ORDER — SODIUM CHLORIDE 9 MG/ML
INJECTION, SOLUTION INTRAVENOUS PRN
Status: DISCONTINUED | OUTPATIENT
Start: 2022-09-20 | End: 2022-09-24 | Stop reason: HOSPADM

## 2022-09-20 RX ORDER — ACETAMINOPHEN 325 MG/1
650 TABLET ORAL EVERY 6 HOURS PRN
Status: DISCONTINUED | OUTPATIENT
Start: 2022-09-20 | End: 2022-09-24 | Stop reason: HOSPADM

## 2022-09-20 RX ORDER — PROMETHAZINE HYDROCHLORIDE 25 MG/1
12.5 TABLET ORAL EVERY 6 HOURS PRN
Status: DISCONTINUED | OUTPATIENT
Start: 2022-09-20 | End: 2022-09-24 | Stop reason: HOSPADM

## 2022-09-20 RX ORDER — SODIUM CHLORIDE 0.9 % (FLUSH) 0.9 %
10 SYRINGE (ML) INJECTION EVERY 12 HOURS SCHEDULED
Status: DISCONTINUED | OUTPATIENT
Start: 2022-09-20 | End: 2022-09-24 | Stop reason: HOSPADM

## 2022-09-20 RX ADMIN — CEFTRIAXONE SODIUM 1000 MG: 1 INJECTION, POWDER, FOR SOLUTION INTRAMUSCULAR; INTRAVENOUS at 21:47

## 2022-09-20 RX ADMIN — SODIUM CHLORIDE 1000 ML: 9 INJECTION, SOLUTION INTRAVENOUS at 18:51

## 2022-09-20 ASSESSMENT — PAIN - FUNCTIONAL ASSESSMENT: PAIN_FUNCTIONAL_ASSESSMENT: NONE - DENIES PAIN

## 2022-09-20 ASSESSMENT — PAIN SCALES - GENERAL: PAINLEVEL_OUTOF10: 4

## 2022-09-20 NOTE — TELEPHONE ENCOUNTER
General Question     Subject: PATIENT STATS SHE HAS TWISTED HER LEG AND IS NOW EXPERIENCING SWELLING. SHE IS UNSURE IF SHE SHOULD RECEIVE AN INJECTION TOMORROW. PLEASE ADVISE.    Patient Jimmy Kate \"Bri\"  Contact Number: [phone]

## 2022-09-20 NOTE — TELEPHONE ENCOUNTER
SPOKE TO PATIENT AND SHE TWISTED HER FOOT/ANKLE AND IS REPORTING SWELLING. I TOLD HER TO ARRIVE A LITTLE BIT EARLY FOR HER APPT TOMORROW AND WE WOULD XRAY HER ANKLE TO MAKE SURE THERE IS NO FRACTURE PRESENT. I TOLD HER AT THIS POINT THERE WOULD BE NO REASON TO NOT GET HER ORTHOVISC INJECTIONS AS HER KNEE WAS NOT INJURED. ADVISED HER TO KEEP LEGS UP AS MUCH AS SHE COULD, ICE, ANKLE PUMPS,  AND ACE WRAP.

## 2022-09-21 LAB
A/G RATIO: 1.1 (ref 1.1–2.2)
ALBUMIN SERPL-MCNC: 3.6 G/DL (ref 3.4–5)
ALP BLD-CCNC: 76 U/L (ref 40–129)
ALT SERPL-CCNC: 14 U/L (ref 10–40)
ANION GAP SERPL CALCULATED.3IONS-SCNC: 11 MMOL/L (ref 3–16)
ANTI-NUCLEAR ANTIBODY (ANA): NEGATIVE
AST SERPL-CCNC: 15 U/L (ref 15–37)
BANDED NEUTROPHILS RELATIVE PERCENT: 10 % (ref 0–7)
BASOPHILS ABSOLUTE: 0 K/UL (ref 0–0.2)
BASOPHILS RELATIVE PERCENT: 0 %
BILIRUB SERPL-MCNC: 0.7 MG/DL (ref 0–1)
BUN BLDV-MCNC: 15 MG/DL (ref 7–20)
CALCIUM SERPL-MCNC: 9 MG/DL (ref 8.3–10.6)
CHLORIDE BLD-SCNC: 98 MMOL/L (ref 99–110)
CO2: 24 MMOL/L (ref 21–32)
CREAT SERPL-MCNC: 0.7 MG/DL (ref 0.6–1.2)
CREATININE URINE: 96.4 MG/DL (ref 28–259)
EKG ATRIAL RATE: 81 BPM
EKG DIAGNOSIS: NORMAL
EKG P AXIS: 65 DEGREES
EKG P-R INTERVAL: 170 MS
EKG Q-T INTERVAL: 350 MS
EKG QRS DURATION: 88 MS
EKG QTC CALCULATION (BAZETT): 406 MS
EKG R AXIS: 86 DEGREES
EKG T AXIS: -71 DEGREES
EKG VENTRICULAR RATE: 81 BPM
EOSINOPHILS ABSOLUTE: 0.3 K/UL (ref 0–0.6)
EOSINOPHILS RELATIVE PERCENT: 3 %
GFR AFRICAN AMERICAN: >60
GFR NON-AFRICAN AMERICAN: >60
GLUCOSE BLD-MCNC: 104 MG/DL (ref 70–99)
HCT VFR BLD CALC: 37.8 % (ref 36–48)
HEMATOLOGY PATH CONSULT: NORMAL
HEMATOLOGY PATH CONSULT: YES
HEMOGLOBIN: 12.7 G/DL (ref 12–16)
LV EF: 53 %
LVEF MODALITY: NORMAL
LYMPHOCYTES ABSOLUTE: 1.5 K/UL (ref 1–5.1)
LYMPHOCYTES RELATIVE PERCENT: 14 %
MAGNESIUM: 2.2 MG/DL (ref 1.8–2.4)
MCH RBC QN AUTO: 29.3 PG (ref 26–34)
MCHC RBC AUTO-ENTMCNC: 33.7 G/DL (ref 31–36)
MCV RBC AUTO: 86.9 FL (ref 80–100)
MONOCYTES ABSOLUTE: 1.5 K/UL (ref 0–1.3)
MONOCYTES RELATIVE PERCENT: 14 %
NEUTROPHILS ABSOLUTE: 7.4 K/UL (ref 1.7–7.7)
NEUTROPHILS RELATIVE PERCENT: 59 %
OSMOLALITY URINE: 371 MOSM/KG (ref 390–1070)
OSMOLALITY: 281 MOSM/KG (ref 280–301)
PARATHYROID HORMONE INTACT: 21.8 PG/ML (ref 14–72)
PDW BLD-RTO: 14.9 % (ref 12.4–15.4)
PHOSPHORUS: 2.8 MG/DL (ref 2.5–4.9)
PLATELET # BLD: 297 K/UL (ref 135–450)
PLATELET SLIDE REVIEW: ADEQUATE
PMV BLD AUTO: 6.5 FL (ref 5–10.5)
POTASSIUM REFLEX MAGNESIUM: 4 MMOL/L (ref 3.5–5.1)
RBC # BLD: 4.35 M/UL (ref 4–5.2)
SLIDE REVIEW: ABNORMAL
SODIUM BLD-SCNC: 133 MMOL/L (ref 136–145)
SODIUM URINE: 49 MMOL/L
TOTAL PROTEIN: 6.8 G/DL (ref 6.4–8.2)
VITAMIN D 25-HYDROXY: 39.3 NG/ML
WBC # BLD: 10.7 K/UL (ref 4–11)

## 2022-09-21 PROCEDURE — 2580000003 HC RX 258: Performed by: INTERNAL MEDICINE

## 2022-09-21 PROCEDURE — G0378 HOSPITAL OBSERVATION PER HR: HCPCS

## 2022-09-21 PROCEDURE — 94640 AIRWAY INHALATION TREATMENT: CPT

## 2022-09-21 PROCEDURE — 6360000002 HC RX W HCPCS: Performed by: INTERNAL MEDICINE

## 2022-09-21 PROCEDURE — 87040 BLOOD CULTURE FOR BACTERIA: CPT

## 2022-09-21 PROCEDURE — 6370000000 HC RX 637 (ALT 250 FOR IP)

## 2022-09-21 PROCEDURE — 80053 COMPREHEN METABOLIC PANEL: CPT

## 2022-09-21 PROCEDURE — 2580000003 HC RX 258

## 2022-09-21 PROCEDURE — 84100 ASSAY OF PHOSPHORUS: CPT

## 2022-09-21 PROCEDURE — 96366 THER/PROPH/DIAG IV INF ADDON: CPT

## 2022-09-21 PROCEDURE — 96367 TX/PROPH/DG ADDL SEQ IV INF: CPT

## 2022-09-21 PROCEDURE — 6370000000 HC RX 637 (ALT 250 FOR IP): Performed by: INTERNAL MEDICINE

## 2022-09-21 PROCEDURE — 83930 ASSAY OF BLOOD OSMOLALITY: CPT

## 2022-09-21 PROCEDURE — 82306 VITAMIN D 25 HYDROXY: CPT

## 2022-09-21 PROCEDURE — 96361 HYDRATE IV INFUSION ADD-ON: CPT

## 2022-09-21 PROCEDURE — 99231 SBSQ HOSP IP/OBS SF/LOW 25: CPT | Performed by: SPECIALIST/TECHNOLOGIST

## 2022-09-21 PROCEDURE — 93005 ELECTROCARDIOGRAM TRACING: CPT | Performed by: INTERNAL MEDICINE

## 2022-09-21 PROCEDURE — 1200000000 HC SEMI PRIVATE

## 2022-09-21 PROCEDURE — 83970 ASSAY OF PARATHORMONE: CPT

## 2022-09-21 PROCEDURE — 36415 COLL VENOUS BLD VENIPUNCTURE: CPT

## 2022-09-21 PROCEDURE — 6370000000 HC RX 637 (ALT 250 FOR IP): Performed by: NURSE PRACTITIONER

## 2022-09-21 PROCEDURE — 83735 ASSAY OF MAGNESIUM: CPT

## 2022-09-21 PROCEDURE — 85025 COMPLETE CBC W/AUTO DIFF WBC: CPT

## 2022-09-21 PROCEDURE — 86038 ANTINUCLEAR ANTIBODIES: CPT

## 2022-09-21 PROCEDURE — C8929 TTE W OR WO FOL WCON,DOPPLER: HCPCS

## 2022-09-21 RX ORDER — ALBUTEROL SULFATE 90 UG/1
2 AEROSOL, METERED RESPIRATORY (INHALATION) EVERY 6 HOURS PRN
Status: DISCONTINUED | OUTPATIENT
Start: 2022-09-21 | End: 2022-09-24 | Stop reason: HOSPADM

## 2022-09-21 RX ORDER — SODIUM CHLORIDE 9 MG/ML
INJECTION, SOLUTION INTRAVENOUS CONTINUOUS
Status: DISCONTINUED | OUTPATIENT
Start: 2022-09-21 | End: 2022-09-24 | Stop reason: HOSPADM

## 2022-09-21 RX ORDER — AMLODIPINE BESYLATE 5 MG/1
5 TABLET ORAL NIGHTLY
Status: DISCONTINUED | OUTPATIENT
Start: 2022-09-21 | End: 2022-09-24 | Stop reason: HOSPADM

## 2022-09-21 RX ORDER — COLCHICINE 0.6 MG/1
0.6 TABLET ORAL DAILY
Status: DISCONTINUED | OUTPATIENT
Start: 2022-09-21 | End: 2022-09-21

## 2022-09-21 RX ORDER — CETIRIZINE HYDROCHLORIDE 10 MG/1
10 TABLET ORAL DAILY
Status: DISCONTINUED | OUTPATIENT
Start: 2022-09-21 | End: 2022-09-24 | Stop reason: HOSPADM

## 2022-09-21 RX ORDER — COLCHICINE 0.6 MG/1
0.6 TABLET ORAL 3 TIMES DAILY
Status: DISCONTINUED | OUTPATIENT
Start: 2022-09-21 | End: 2022-09-21

## 2022-09-21 RX ORDER — FLUTICASONE PROPIONATE 50 MCG
2 SPRAY, SUSPENSION (ML) NASAL DAILY
Status: DISCONTINUED | OUTPATIENT
Start: 2022-09-21 | End: 2022-09-24 | Stop reason: HOSPADM

## 2022-09-21 RX ORDER — COLCHICINE 0.6 MG/1
0.6 TABLET ORAL 2 TIMES DAILY
Status: DISCONTINUED | OUTPATIENT
Start: 2022-09-21 | End: 2022-09-24 | Stop reason: HOSPADM

## 2022-09-21 RX ADMIN — Medication 2 PUFF: at 20:58

## 2022-09-21 RX ADMIN — COLCHICINE 0.6 MG: 0.6 TABLET, FILM COATED ORAL at 20:05

## 2022-09-21 RX ADMIN — Medication 2 PUFF: at 08:10

## 2022-09-21 RX ADMIN — SODIUM CHLORIDE: 9 INJECTION, SOLUTION INTRAVENOUS at 09:28

## 2022-09-21 RX ADMIN — CEFTRIAXONE SODIUM 1000 MG: 1 INJECTION, POWDER, FOR SOLUTION INTRAMUSCULAR; INTRAVENOUS at 21:55

## 2022-09-21 RX ADMIN — AMLODIPINE BESYLATE 5 MG: 5 TABLET ORAL at 20:44

## 2022-09-21 RX ADMIN — ACETAMINOPHEN 650 MG: 325 TABLET ORAL at 00:37

## 2022-09-21 RX ADMIN — COLCHICINE 0.6 MG: 0.6 TABLET, FILM COATED ORAL at 14:22

## 2022-09-21 RX ADMIN — VANCOMYCIN HYDROCHLORIDE 1000 MG: 1 INJECTION, POWDER, LYOPHILIZED, FOR SOLUTION INTRAVENOUS at 20:07

## 2022-09-21 RX ADMIN — Medication 10 ML: at 00:36

## 2022-09-21 RX ADMIN — VANCOMYCIN HYDROCHLORIDE 1000 MG: 1 INJECTION, POWDER, LYOPHILIZED, FOR SOLUTION INTRAVENOUS at 02:42

## 2022-09-21 RX ADMIN — CETIRIZINE HYDROCHLORIDE 10 MG: 10 TABLET, FILM COATED ORAL at 20:44

## 2022-09-21 RX ADMIN — ACETAMINOPHEN 650 MG: 325 TABLET ORAL at 20:05

## 2022-09-21 ASSESSMENT — PAIN DESCRIPTION - ORIENTATION
ORIENTATION: LEFT
ORIENTATION: LEFT

## 2022-09-21 ASSESSMENT — PAIN SCALES - GENERAL
PAINLEVEL_OUTOF10: 0
PAINLEVEL_OUTOF10: 4
PAINLEVEL_OUTOF10: 5

## 2022-09-21 ASSESSMENT — PAIN DESCRIPTION - LOCATION
LOCATION: ANKLE;LEG
LOCATION: ANKLE;KNEE

## 2022-09-21 NOTE — PLAN OF CARE
Problem: Pain  Goal: Verbalizes/displays adequate comfort level or baseline comfort level  9/21/2022 1037 by Genaro Stoddard RN  Outcome: Progressing  9/21/2022 0046 by Brandei Bernal RN  Outcome: Progressing

## 2022-09-21 NOTE — PROGRESS NOTES
Hospitalist Progress Note      PCP: Leydi Richard MD    Date of Admission: 9/20/2022    Chief Complaint: fever, joint swelling    Hospital Course: [de-identified] y.o. female who presented to [facility] with past medical history of hypertension, emphysema, osteoarthritis, asthma presented to the ED with chief complaint of left ankle pain    Patient reported that she had teeth extraction almost a month ago she is doing took antibiotics unclear which type completed but has reported that since then has had elbow pain and bilateral wrist pain no known alleviating factor and occurred when she woke up in the morning. Then the symptoms resolved and the patient been having intermittent night sweats and intermittent fevers was noted to have a UTI and was started on Macrobid which the patient has been taking daily. Patient then came here after waking up in the morning with left ankle being more swollen no known alleviating or exacerbating factor patient. Patient denied having any autoimmune disease, or prior history of joint swelling in the past.     Subjective: denies chest pain, dyspnea, n/v/d, dysuria, fever, chills, headache. Reports left ankle pain.        Medications:  Reviewed    Infusion Medications    sodium chloride 75 mL/hr at 09/21/22 0928    sodium chloride       Scheduled Medications    cefTRIAXone (ROCEPHIN) IV  1,000 mg IntraVENous Q24H    fluticasone  2 spray Each Nostril Daily    mometasone-formoterol  2 puff Inhalation BID    vancomycin  1,000 mg IntraVENous Q18H    colchicine  0.6 mg Oral BID    sodium chloride flush  10 mL IntraVENous 2 times per day     PRN Meds: albuterol sulfate HFA, sodium chloride flush, sodium chloride, promethazine **OR** ondansetron, acetaminophen **OR** acetaminophen      Intake/Output Summary (Last 24 hours) at 9/21/2022 1554  Last data filed at 9/21/2022 0521  Gross per 24 hour   Intake 60 ml   Output 525 ml   Net -465 ml       Physical Exam Performed:    /63   Pulse 91 Temp 98.5 °F (36.9 °C) (Oral)   Resp 16   Ht 5' 1\" (1.549 m)   Wt 152 lb 12.5 oz (69.3 kg)   SpO2 93%   BMI 28.87 kg/m²     General appearance: resting in bed, comfortable. No apparent distress, appears stated age and cooperative. HEENT: Pupils equal, round, and reactive to light. Conjunctivae/corneas clear. Neck: Supple, with full range of motion. No jugular venous distention. Trachea midline. Respiratory:  Normal respiratory effort. Clear to auscultation, bilaterally without Rales/Wheezes/Rhonchi. Room air  Cardiovascular: Regular rate and rhythm with normal S1/S2 without, rubs or gallops. Murmur  Abdomen: Soft, non-tender, non-distended with normal bowel sounds. Musculoskeletal: No clubbing, cyanosis or edema bilaterally. Full range of motion without deformity. Left ankle warm, swollen, pain with palpation  Skin: Skin color, texture, turgor normal. No rashes or lesions. Neurologic: Neurovascularly intact without any focal sensory/motor deficits. Cranial nerves: II-XII intact, grossly non-focal.  Psychiatric: Alert and oriented, thought content appropriate, normal insight  Capillary Refill: Brisk, 3 seconds, normal   Peripheral Pulses: +2 palpable, equal bilaterally       Labs:   Recent Labs     09/20/22  1708 09/21/22  0642   WBC 13.7* 10.7   HGB 14.6 12.7   HCT 43.2 37.8    297     Recent Labs     09/20/22  1708 09/21/22  0642   * 133*   K 4.7 4.0   CL 89* 98*   CO2 26 24   BUN 20 15   CREATININE 1.0 0.7   CALCIUM 9.8 9.0   PHOS  --  2.8     Recent Labs     09/20/22  1708 09/21/22  0642   AST 23 15   ALT 25 14   BILITOT 0.9 0.7   ALKPHOS 97 76     No results for input(s): INR in the last 72 hours. No results for input(s): Ardelle Chalk in the last 72 hours.     Urinalysis:      Lab Results   Component Value Date/Time    NITRU Negative 09/20/2022 08:32 PM    WBCUA 0-2 09/20/2022 08:32 PM    BACTERIA Rare 08/30/2017 08:18 PM    RBCUA 0-2 09/20/2022 08:32 PM    BLOODU TRACE-INTACT 09/20/2022 08:32 PM    SPECGRAV 1.010 09/20/2022 08:32 PM    GLUCOSEU Negative 09/20/2022 08:32 PM       Radiology:  XR CHEST PORTABLE   Final Result   1. No acute cardiopulmonary findings. 2.  Status post left pneumonectomy. XR ANKLE LEFT (MIN 3 VIEWS)   Final Result   Soft tissue swelling surrounding the ankle with no other acute findings. Ossifications medial malleolus that could be developmental or related to an   old injury. Moderate calcaneal spurring. Assessment/Plan:    Active Hospital Problems    Diagnosis     Sepsis ([address] [A41.9]      Priority: Medium     Sepsis, present on arrival  -Several possible sources: UTI, left ankle, recent tooth extraction  -Leukocytosis, tachycardia, elevated procal  -UA on arrival bland  -Cultures pending (blood and body fluid)  -Echo: EF 50-55%; G1 DD; no mention of vegetation    Pseudogout  -Fluid from left ankle sent for study: cloudy, yellow fluid; calcium pyrophosphate dihydrate crystals; extracellular crystals  -Vanc/ceftriaxone received in ED  -Continue abx for now, follow cultures  -Ortho consulted, appreciate recs  -ESR 51, .9  -Colchicine BID  -Monitor     Hyponatremia  -Na 130 on arrival, is now 133  -IV fluid bolus in ED  -Monitor    Hypertension, controlled  -Hold home meds  -Monitor    Recent tooth extraction  -Reported to have been ~1 month ago  -Took an abx following, but unsure of which it was    Emphysema  -Per chart review, 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

## 2022-09-21 NOTE — PROGRESS NOTES
Patient admitted to room 543 from ED. Patient oriented to room, call light, bed rails, phone, lights and bathroom. Bed alarm in place, patient aware of placement and reason. Telemetry box  in place, patient aware of placement and reason. Standard precautions in place.

## 2022-09-21 NOTE — CARE COORDINATION
CASE MANAGEMENT INITIAL ASSESSMENT      Reviewed chart and completed assessment with patient:jason  Family present: yes  Explained Case Management role/services. yes    Primary contact information:[employer] Decision Maker :   Primary Decision Maker: Apollo King Child - [phone]    Secondary Decision Maker: Connor Dewey - Child - [phone]    Secondary Decision Maker: Coby Barrera - Child - [phone]    Secondary Decision Maker: Bertha Gomes - Child - [phone]          Can this person be reached and be able to respond quickly, such as within a few minutes or hours? Yes    Admit date/status:09/20/22  Diagnosis:Pseudogout   Is this a Readmission?:  No      Insurance:[employer]et Medicare   Precert required for SNF: Yes       3 night stay required: No    Living arrangements, Adls, care needs, prior to admission:Lives alone, IPTA for all ADL's    Durable Medical Equipment at home:  Walker__Cane_X_RTS__ BSC__Shower Chair__  02__ HHN__ CPAP__  BiPap__  Hospital Bed__ W/C___ Other_____    Services in the home and/or outpatient, prior to admission:none    Current PCP:Taco Caruso        Medications: Prescription coverage? Yes     Transportation needs: none       PT/OT recs:when appropriate per ortho [facility] Notification (HEN):needed for SNF    Barriers to discharge:none    Plan/comments:Alva herzog in a house alone, with 4 BAUTISTA. Patient was IPTA and could manage all ADL's. She stated she had used a cane in the past. Patient needs an echo, ortho sx to weigh in, and PT/OT when appropriate. Patient is open to [facility] is needed. Will continue to follow.       Belen Carolina RN

## 2022-09-21 NOTE — PLAN OF CARE
Problem: Discharge Planning  Goal: Discharge to home or other facility with appropriate resources  Outcome: Progressing  Flowsheets (Taken 9/20/2022 4932)  Discharge to home or other facility with appropriate resources: Identify barriers to discharge with patient and caregiver     Problem: Pain  Goal: Verbalizes/displays adequate comfort level or baseline comfort level  Outcome: Progressing     Problem: Safety - Adult  Goal: Free from fall injury  Outcome: Progressing     Problem: ABCDS Injury Assessment  Goal: Absence of physical injury  Outcome: Progressing Returned call and spoke with Farhana. Advised to contact Dr. Espinosa as her office is managing patient care and chemotherapy.     ----- Message from Elizabeth Fish sent at 10/6/2020  9:09 AM CDT -----  Who Called: Farhana ([facility])    What is the reqeust in detail: Wanted to inform the office that the patient is complaining of leg weakeness and the PT is concerned. Please advise.     Can the clinic reply by [facility]?: No    Best Call Back Number: [phone]

## 2022-09-21 NOTE — CONSULTS
Department of Orthopedic Surgery  Physician Assistant   Consult Note        Reason for Consult: Possible left ankle septic arthritis  Requesting Physician: Gallo Kaur MD  Date of Service: 9/21/2022 3:37 PM    CHIEF COMPLAINT:  As Above    History Obtained From:  patient, family member -patient's son    HISTORY OF PRESENT ILLNESS:                The patient is a [de-identified] y.o. female who presents with above chief complaint. Patient states that on Monday she began experiencing ankle pain that progressed to complete inability to bear weight on the left ankle. Of note, she has experienced intermittent fevers as high as 102 degrees for the last month, been seen recently in urgent care and diagnosed with urinary tract infection that was treated with a course of oral antibiotics. She also notes that she had some recent left elbow pain, stiffness, that resolved spontaneously. She is status post tooth extraction a few weeks ago. Patient presented to [facility] for further evaluation after talking with her daughter who told her that you can get an infection in your blood after tooth extraction. Patient notes pain with movement or weightbearing on the left ankle, recent increase in swelling, status post arthrocentesis in the ED. Denies any dysesthesias or other orthopedic complaints at this time.  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 for leaking fluid at same time of cochlear implant     Past Surgical History:        Procedure Laterality Date    APPENDECTOMY      APPENDECTOMY      CARDIAC CATHETERIZATION  2004    COCHLEAR IMPLANT  2012    COLONOSCOPY 11/16/2017    polyp    EYE SURGERY      HYSTERECTOMY (CERVIX STATUS UNKNOWN)  1994    LUNG SURGERY      Left lung complete removal    SHOULDER SURGERY      right shoulder- no block given due to only having one lung    SHOULDER SURGERY Left     video arthroscopy, rotator cuff repair, biceps tenodesis    SKIN BIOPSY      TONSILLECTOMY           Medications Prior to Admission:   Prior to Admission medications    Medication Sig Start Date End Date Taking? Authorizing Provider   meloxicam (MOBIC) 15 MG tablet Take 1 tablet by mouth daily 2/9/22   Antolin Aguilar MD   meloxicam (MOBIC) 7.5 MG tablet Take 1 tablet by mouth daily 10/21/20   Antolin Aguilar MD   Boswellia-Glucosamine-Vit D (OSTEO BI-FLEX ONE PER DAY PO) Take by mouth    Historical Provider, MD   nabumetone (RELAFEN) 500 MG tablet Take 1 tablet by mouth 2 times daily 10/14/19   Antolin Aguilar MD   fluticasone (FLONASE) 50 MCG/ACT nasal spray USE 2 SPRAYS NASALLY DAILY 1/30/17   Historical Provider, MD   mometasone-formoterol [facility]) 200-5 MCG/ACT inhaler Inhale 2 puffs into the lungs 3/2/17   Historical Provider, MD   NONFORMULARY AREDS eye vitamins    Historical Provider, MD   albuterol (PROVENTIL HFA) 108 (90 BASE) MCG/ACT inhaler Inhale 2 puffs into the lungs every 6 hours as needed for Wheezing. 10/16/14   Dorothy Thomas MD   amLODIPine (NORVASC) 10 MG tablet Take  by mouth daily. Verify dose from H & P    Historical Provider, MD   loratadine (CLARITIN) 10 MG capsule Take  by mouth. Clarify dose    Historical Provider, MD       Allergies:  Latex, Morphine and related, Percocet [oxycodone-acetaminophen], Codeine, Tetracycline, Tetracyclines & related, and Morphine    Social History:    Tobacco:  reports that she has never smoked. She has never used smokeless tobacco.   Alcohol:  reports no history of alcohol use.    Illicit Drug: No  Family History:       Problem Relation Age of Onset    High Blood Pressure Father     Migraines Father     Heart Attack Father [[id number]]    Collected: 09/20/22 2018    Updated: 09/20/22 2122    Specimen Source: Synovial Fluid     Source Body Fluid Ankle   Left    Crystals, Fluid see below    Comment: Calcium pyrophosphate dihydrate       Crystal Cmt 2 see below    Comment: Extracellular crystals are present       Path Consult Fluid Yes    Comment: Sent for Pathology Review          Radiology:   XR CHEST PORTABLE   Final Result   1. No acute cardiopulmonary findings. 2.  Status post left pneumonectomy. XR ANKLE LEFT (MIN 3 VIEWS)   Final Result   Soft tissue swelling surrounding the ankle with no other acute findings. Ossifications medial malleolus that could be developmental or related to an   old injury. Moderate calcaneal spurring. IMPRESSION/RECOMMENDATIONS:    Assessment: Left ankle pseudogout    Plan:  1) plan for nonoperative management of the left ankle. Synovial fluid sample from the emergency department indicated presence of calcium pyrophosphate dihydrate crystals consistent with pseudogout. Cell count 14,000 nucleated cells, slight leukocytosis yesterday that has resolved today, afebrile since presentation to the hospital. Low suspicion for septic joint, cultures no growth to date. Recommend treatment for pseudogout managed by hospitalist team.  Ace wrap was placed today in the left ankle for compression to help with swelling and pain control. Okay for PT/OT weightbearing as tolerated to left lower extremity with assistive device. Orthopedics will follow up tomorrow with patient for improvement of symptoms/swelling with gout medication. Patient can follow-up with orthopedics outpatient as well to check for for total resolution of symptoms. Thank you for the opportunity to consult on this patient.     Wendy Zacarias

## 2022-09-21 NOTE — CONSULTS
Pharmacy to Dose Vancomycin    Dx: Bone and Joint infection  Goal trough = 15-20  Pt wt = 67.1 kg  Recent Labs     09/20/22  1708   CREATININE 1.0     Recent Labs     09/20/22  1708   WBC 13.7*     Regimen: 1000 mg IV every 18 hours. Start time: 01:55 on 09/21/2022  Exposure target: AUC24 (range)400-600 mg/L.hr   AUC24,ss: 489 mg/L.hr  Probability of AUC24 > 400: 74 %  Ctrough,ss: 15.3 mg/L  Probability of Ctrough,ss > 20: 22 %  Probability of nephrotoxicity (Lodise ANDREINA 2009): 11 %  Vancomycin trough: 9/22 1400  Talya Rodriguez [facility] 9/21/2022 1:56 AM      Vancomycin Day 2  Current Dosing: Vancomycin 1000 mg q18h  Recent Labs     09/22/22  1350   VANCOTROUGH 6.4*     Recent Labs     09/20/22  1708 09/21/22  0642 09/22/22  0602   CREATININE 1.0 0.7 0.6     Estimated Creatinine Clearance: 68 mL/min (based on SCr of 0.6 mg/dL). Recent Labs     09/20/22  1708 09/21/22  0642 09/22/22  0602   WBC 13.7* 10.7 10.3     Culture Date      Source                       Results  9/20                  body fluid                    NGTD  9/21                    blood                         NGTD    Calculated AUC < 400  Increase to Vancomycin 1000 mg q12h; Est   Vancomycin level ordered for tomorrow at 2220 [facility], PharmD 9/22/2022  2:37 PM      Vancomycin Day 3  Current Dosing: Vancomycin 1000 mg q12h  Recent Labs     09/21/22  0642 09/22/22  0602 09/23/22  0649   CREATININE 0.7 0.6 0.8     Estimated Creatinine Clearance: 51 mL/min (based on SCr of 0.8 mg/dL).   Recent Labs     09/21/22  0642 09/22/22  0602 09/23/22  0649   WBC 10.7 10.3 7.9     Culture Date      Source                       Results  9/20                  body fluid                    NGTD  9/21                    blood                         NGTD    Calculated AUC > 600  SCr fluctuating  Vancomycin random ordered for today at 1400  May need to decrease frequency to q18h, but will adjust based on level  Francie Hunter PharmD 9/23/2022  9:31 AM

## 2022-09-21 NOTE — PROGRESS NOTES
Occupational Therapy   Treatment Attempt Note    Attempted OT evaluation this date, however pt declining till tomorrow. Pt pleasant and polite, pt had company and requesting OT return at later date. Will re-attempt on 9/22.        Flores Wiseman, OTR/L

## 2022-09-21 NOTE — PROGRESS NOTES
4 Eyes Skin Assessment     The patient is being assess for   Admission    I agree that 2 RN's have performed a thorough Head to Toe Skin Assessment on the patient. ALL assessment sites listed below have been assessed. Areas assessed for pressure by both nurses:   [x]   Head, Face, and Ears   [x]   Shoulders, Back, and Chest, Abdomen  [x]   Arms, Elbows, and Hands   [x]   Coccyx, Sacrum, and Ischium  [x]   Legs, Feet, and Heels        Skin Assessed Under all Medical Devices by both nurses:  None noted               All Mepilex Borders were peeled back and area peeked at by both nurses:  No: none noted  Please list where Mepilex Borders are located:  none noted             **SHARE this note so that the co-signing nurse is able to place an eSignature**    Co-signer eSignature: Electronically signed by Peyton Baxter RN on 9/21/22 at 6:40 AM EDT    Does the Patient have Skin Breakdown related to pressure?   No            Cliff Prevention initiated:  No   Wound Care Orders initiated:  No      [facility] nurse consulted for Pressure Injury (Stage 3,4, Unstageable, DTI, NWPT, Complex wounds)and New or Established Ostomies:  No      Primary Nurse eSignature: Electronically signed by Kvng Hewitt RN on 9/21/22 at 6:27 AM EDT

## 2022-09-21 NOTE — ED PROVIDER NOTES
[facility]  ED  EMERGENCY DEPARTMENT ENCOUNTER      Pt Name: Colette Anderson  MRN: [id number]  [place] 1940  Date of evaluation: 9/20/2022  Provider: Dino Baptiste MD    CHIEF COMPLAINT       Chief Complaint   Patient presents with    Fever     Off and on for last month. Seen at Urgent Care on Saturday, diagnosed with UTI. Started on Nitrofurantoin. Fever of 101.2 when she awoke from nap today. Pt had tooth extraction at the ed of August.    Joint Swelling     Left ankle starting 3 weeks prior. Swelling into foot and up into leg with redness over lateral malleolus. Pt denies injury. HISTORY OF PRESENT ILLNESS   (Location/Symptom, Timing/Onset, Context/Setting, Quality, Duration, Modifying Factors, Severity)  Note limiting factors. Colette Anderson is a [de-identified] y.o. female with past medical history of hypertension, hepatitis C here today with intermittent fevers and left ankle pain. The patient states that for nearly a month she has been having intermittent fevers at home up to 102 degrees. She states that approximately a week prior to this she had a tooth extracted and did take a brief course of antibiotics. She notes the fevers have been intermittent throughout this time. And when they initially started she was having some wrist pain, redness and swelling that was relatively short-lived. She states she is just feeling tired and fatigued and had little energy. She notes that last week she went to an urgent care facility and was told that she had a urinary tract infection and started taking nitrofurantoin but states she continues to have fevers. In addition to that, she states over the past 24 to 48 hours she has had increasing redness, swelling, and pain in the left ankle. Reports no trauma. States it is too painful to walk on. She is had mild diarrhea since starting antibiotics but denies nausea or vomiting. No abdominal pain. Denies dysuria at this time.   She has no shortness of breath or cough though has a remote history of a left lung resection due to prior empyema when she was 5years old    HPI    Nursing Notes were reviewed. REVIEW OF SYSTEMS    (2-9 systems for level 4, 10 or more for level 5)     Review of Systems    Please see HPI for pertinent positive and negative review of system findings. A full 10 system ROS was performed and otherwise negative. 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         SURGICAL HISTORY       Past Surgical History:   Procedure Laterality Date    APPENDECTOMY      APPENDECTOMY      CARDIAC CATHETERIZATION  2004    COCHLEAR IMPLANT  2012    COLONOSCOPY  11/16/2017    polyp    HYSTERECTOMY (CERVIX STATUS UNKNOWN)  1994    LUNG SURGERY      Left lung complete removal    SHOULDER SURGERY      right shoulder- no block given due to only having one lung    SHOULDER SURGERY Left     video arthroscopy, rotator cuff repair, biceps tenodesis         CURRENT MEDICATIONS       Previous Medications    ALBUTEROL (PROVENTIL HFA) 108 (90 BASE) MCG/ACT INHALER    Inhale 2 puffs into the lungs every 6 hours as needed for Wheezing. AMLODIPINE (NORVASC) 10 MG TABLET    Take  by mouth daily. Verify dose from H & P    BOSWELLIA-GLUCOSAMINE-VIT D (OSTEO BI-FLEX ONE PER DAY PO)    Take by mouth    FLUTICASONE (FLONASE) 50 MCG/ACT NASAL SPRAY    USE 2 SPRAYS NASALLY DAILY    LORATADINE (CLARITIN) 10 MG CAPSULE    Take  by mouth.  Clarify dose    MELOXICAM (MOBIC) 15 MG TABLET    Take 1 tablet by mouth daily    MELOXICAM (MOBIC) 7.5 MG TABLET    Take 1 tablet by mouth daily    MOMETASONE-FORMOTEROL left foot. Psychiatric:  Normal mood      DIAGNOSTIC RESULTS       Labs Reviewed   CBC WITH AUTO DIFFERENTIAL - Abnormal; Notable for the following components:       Result Value    WBC 13.7 (*)     Neutrophils Absolute 11.0 (*)     Monocytes Absolute 1.4 (*)     Bands Relative 13 (*)     Anisocytosis Occasional (*)     Macrocytes Occasional (*)     Polychromasia Occasional (*)     Poikilocytes Occasional (*)     All other components within normal limits   COMPREHENSIVE METABOLIC PANEL - Abnormal; Notable for the following components:    Sodium 130 (*)     Chloride 89 (*)     Glucose 109 (*)     GFR Non- 53 (*)     All other components within normal limits   URINALYSIS WITH REFLEX TO CULTURE - Abnormal; Notable for the following components:    Ketones, Urine TRACE (*)     Blood, Urine TRACE-INTACT (*)     All other components within normal limits   C-REACTIVE PROTEIN - Abnormal; Notable for the following components:    .9 (*)     All other components within normal limits   SEDIMENTATION RATE - Abnormal; Notable for the following components:    Sed Rate 51 (*)     All other components within normal limits   CULTURE, BLOOD 1   CULTURE, BLOOD 2   CULTURE, BODY FLUID    Narrative:     ORDER#: [id number]                          ORDERED BY: Jorge Saez  SOURCE: Synovial Fluid                     COLLECTED:  09/20/22 20:18  ANTIBIOTICS AT JEFF.:                      RECEIVED :  09/20/22 20:26   COVID-19 & INFLUENZA COMBO   LACTIC ACID   SAMPLE POSSIBLE BLOOD BANK TESTING   CELL COUNT WITH DIFFERENTIAL, BODY FLUID   CRYSTALS, BODY FLUID   MICROSCOPIC URINALYSIS       Interpretation per the Radiologist below, if obtained/available at the time of this note:    XR CHEST PORTABLE   Final Result   1. No acute cardiopulmonary findings. 2.  Status post left pneumonectomy.          XR ANKLE LEFT (MIN 3 VIEWS)   Final Result   Soft tissue swelling surrounding the ankle with no other acute findings. Ossifications medial malleolus that could be developmental or related to an   old injury. Moderate calcaneal spurring. All other labs/imaging were within normal range or not returned as of this dictation. EMERGENCY DEPARTMENT COURSE and DIFFERENTIAL DIAGNOSIS/MDM:   Vitals:    Vitals:    09/20/22 1857 09/20/22 1920 09/20/22 1950 09/20/22 2020   BP: (!) 129/56 (!) 119/51 (!) 125/47 (!) 117/59   Pulse: 83 84 88 89   Resp: 16 28 18 22   Temp:       TempSrc:       SpO2: 94% 94% 95% 95%   Weight:       Height:           Patient presented to the emergency department today with intermittent fevers over the last month. She noticed that this started to occur approximately 1 week after a dental extraction. Seen recently diagnosed with possible UTI just based on trace leuk esterase on urinalysis dipstick without microscopic testing. Overall not overtly consistent with UTI. Currently on nitrofurantoin. Here today vital signs are stable but she does have significant leukocytosis with left shift and elevated ESR and CRP. Primarily pain at this time is in the left ankle. As it was red, swollen and had significant painful passive range of motion I was concerned for possible septic arthritis and did perform arthrocentesis which ultimately showed evidence of pseudogout. There were only 14,500 nucleated cells which is low and unlikely to represent a septic arthritis especially in association with CPPD crystals. Orthopedics consulted and agree with this based on clinical findings. Ankle synovial fluid cultures are pending however. Still plan to admit the patient to the hospital to follow along blood cultures as there is concern for underlying bacteremia. She was given 1 dose of Rocephin here. Abdelrahman Marti M.D., am the primary clinician of record.      MDM      CONSULTS     IP CONSULT TO HOSPITALIST    Critical Care:   None    REASSESSMENT          PROCEDURE     Unless otherwise

## 2022-09-21 NOTE — RT PROTOCOL NOTE
RT Inhaler-Nebulizer Bronchodilator Protocol Note    There is a bronchodilator order in the chart from a provider indicating to follow the RT Bronchodilator Protocol and there is an Initiate RT Inhaler-Nebulizer Bronchodilator Protocol order as well (see protocol at bottom of note). CXR Findings:  XR CHEST PORTABLE    Result Date: 9/20/2022  1. No acute cardiopulmonary findings. 2.  Status post left pneumonectomy. The findings from the last RT Protocol Assessment were as follows:   History Pulmonary Disease: None or smoker <15 pack years  Respiratory Pattern: Regular pattern and RR 12-20 bpm  Breath Sounds: Slightly diminished and/or crackles  Cough: Strong, spontaneous, non-productive  Indication for Bronchodilator Therapy: On home bronchodilators  Bronchodilator Assessment Score: 2    Aerosolized bronchodilator medication orders have been revised according to the RT Inhaler-Nebulizer Bronchodilator Protocol below. Respiratory Therapist to perform RT Therapy Protocol Assessment initially then follow the protocol. Repeat RT Therapy Protocol Assessment PRN for score 0-3 or on second treatment, BID, and PRN for scores above 3. No Indications - adjust the frequency to every 6 hours PRN wheezing or bronchospasm, if no treatments needed after 48 hours then discontinue using Per Protocol order mode. If indication present, adjust the RT bronchodilator orders based on the Bronchodilator Assessment Score as indicated below. Use Inhaler orders unless patient has one or more of the following: on home nebulizer, not able to hold breath for 10 seconds, is not alert and oriented, cannot activate and use MDI correctly, or respiratory rate 25 breaths per minute or more, then use the equivalent nebulizer order(s) with same Frequency and PRN reasons based on the score. If a patient is on this medication at home then do not decrease Frequency below that used at home.     0-3 - enter or revise RT bronchodilator order(s) to equivalent RT Bronchodilator order with Frequency of every 4 hours PRN for wheezing or increased work of breathing using Per Protocol order mode. 4-6 - enter or revise RT Bronchodilator order(s) to two equivalent RT bronchodilator orders with one order with BID Frequency and one order with Frequency of every 4 hours PRN wheezing or increased work of breathing using Per Protocol order mode. 7-10 - enter or revise RT Bronchodilator order(s) to two equivalent RT bronchodilator orders with one order with TID Frequency and one order with Frequency of every 4 hours PRN wheezing or increased work of breathing using Per Protocol order mode. 11-13 - enter or revise RT Bronchodilator order(s) to one equivalent RT bronchodilator order with QID Frequency and an Albuterol order with Frequency of every 4 hours PRN wheezing or increased work of breathing using Per Protocol order mode. Greater than 13 - enter or revise RT Bronchodilator order(s) to one equivalent RT bronchodilator order with every 4 hours Frequency and an Albuterol order with Frequency of every 2 hours PRN wheezing or increased work of breathing using Per Protocol order mode. RT to enter RT Home Evaluation for COPD & MDI Assessment order using Per Protocol order mode.     Electronically signed by Chichi Almanza RCP on 9/21/2022 at 4:50 AM

## 2022-09-21 NOTE — FLOWSHEET NOTE
09/20/22 6600   Assessment   Charting Type Admission   Psychosocial   Psychosocial (WDL) WDL   Neurological   Neuro (WDL) WDL   Level of Consciousness 0   Carmella Coma Scale   Eye Opening 4   Best Verbal Response 5   Best Motor Response 6   Carmella Coma Scale Score 15   HEENT (Head, Ears, Eyes, Nose, & Throat)   HEENT (WDL) X   Right Eye Glasses; Impaired vision   Left Eye Glasses; Impaired vision   Right Ear Impaired hearing   Left Ear Impaired hearing   Respiratory   Respiratory (WDL) X   Respiratory Pattern Regular   Respiratory Depth Normal   Respiratory Quality/Effort Unlabored   Breath Sounds   Right Upper Lobe Clear   Right Middle Lobe Clear   Right Lower Lobe Clear   Cardiac   Cardiac (WDL) WDL   Gastrointestinal   Abdominal (WDL) WDL   Genitourinary   Genitourinary (WDL) X  (purewick)   Peripheral Vascular   Peripheral Vascular (WDL) X   Edema Left lower extremity   LLE Edema +2   RLE Neurovascular Assessment   Capillary Refill Less than/Equal to 3 seconds   Color Appropriate for Ethnicity   Temperature Warm   R Pedal Pulse +1   LLE Neurovascular Assessment   Capillary Refill Less than/Equal to 3 seconds   Color Red   Temperature Hot   L Pedal Pulse +1   Dual Clinician Skin Assessment   Dual Skin Assessment (4 Eyes) WDL   Skin Integumentary    Skin Integumentary (WDL) WDL   Musculoskeletal   Musculoskeletal (WDL) X   RL Extremity Weakness   LL Extremity Weakness; Swelling   Musculoskeletal Details   L Ankle Swelling

## 2022-09-21 NOTE — H&P
Hospital Medicine History & Physical      PCP: Stanley Astorga MD    Date of Admission: 9/20/2022    Date of Service: Pt seen/examined on 9/20/2022    Pt seen/examined face to face on and admitted as inpatient with expected LOS greater than two midnights due to medical therapy  Chief Complaint:    Chief Complaint   Patient presents with    Fever     Off and on for last month. Seen at Urgent Care on Saturday, diagnosed with UTI. Started on Nitrofurantoin. Fever of 101.2 when she awoke from nap today. Pt had tooth extraction at the ed of August.    Joint Swelling     Left ankle starting 3 weeks prior. Swelling into foot and up into leg with redness over lateral malleolus. Pt denies injury. History Of Present Illness:      [de-identified] y.o. female who presented to [facility] with past medical history of hypertension, emphysema, osteoarthritis, asthma presented to the ED with chief complaint of left ankle pain      Patient reported that she had teeth extraction almost a month ago she is doing took antibiotics unclear which type completed but has reported that since then has had elbow pain and bilateral wrist pain no known alleviating factor and occurred when she woke up in the morning. Then the symptoms resolved and the patient been having intermittent night sweats and intermittent fevers was noted to have a UTI and was started on Macrobid which the patient has been taking daily. Patient then came here after waking up in the morning with left ankle being more swollen no known alleviating or exacerbating factor patient.   Patient denied having any autoimmune disease, or prior history of joint swelling in the past.  Past Medical History:          Diagnosis Date    Asthma     Ear discomfort     Hepatitis C     completely gone now took special medcation summer 2014- no problem with LFT's now    Hypertension     Liver disease     Lung disorder     empyema as child left lung removed    Macular degeneration Osteoarthritis     Skull anomaly     had skull sugery for leaking fluid at same time of cochlear implant       Past Surgical History:          Procedure Laterality Date    APPENDECTOMY      APPENDECTOMY      CARDIAC CATHETERIZATION  2004    COCHLEAR IMPLANT  2012    COLONOSCOPY  11/16/2017    polyp    EYE SURGERY      HYSTERECTOMY (CERVIX STATUS UNKNOWN)  1994    LUNG SURGERY      Left lung complete removal    SHOULDER SURGERY      right shoulder- no block given due to only having one lung    SHOULDER SURGERY Left     video arthroscopy, rotator cuff repair, biceps tenodesis    SKIN BIOPSY      TONSILLECTOMY         Medications Prior to Admission:      Prior to Admission medications    Medication Sig Start Date End Date Taking? Authorizing Provider   meloxicam (MOBIC) 15 MG tablet Take 1 tablet by mouth daily 2/9/22   Josiah Simmons MD   meloxicam (MOBIC) 7.5 MG tablet Take 1 tablet by mouth daily 10/21/20   Josiah Simmons MD   Boswellia-Glucosamine-Vit D (OSTEO BI-FLEX ONE PER DAY PO) Take by mouth    Historical Provider, MD   nabumetone (RELAFEN) 500 MG tablet Take 1 tablet by mouth 2 times daily 10/14/19   Josiah Simmons MD   fluticasone (FLONASE) 50 MCG/ACT nasal spray USE 2 SPRAYS NASALLY DAILY 1/30/17   Historical Provider, MD   mometasone-formoterol [facility]) 200-5 MCG/ACT inhaler Inhale 2 puffs into the lungs 3/2/17   Historical Provider, MD   NONFORMULARY AREDS eye vitamins    Historical Provider, MD   albuterol (PROVENTIL HFA) 108 (90 BASE) MCG/ACT inhaler Inhale 2 puffs into the lungs every 6 hours as needed for Wheezing. 10/16/14   Tomasa Leventhal, MD   amLODIPine (NORVASC) 10 MG tablet Take  by mouth daily. Verify dose from H & P    Historical Provider, MD   loratadine (CLARITIN) 10 MG capsule Take  by mouth.  Clarify dose    Historical Provider, MD       Allergies:  Latex, Morphine and related, Percocet [oxycodone-acetaminophen], Codeine, Tetracycline, Tetracyclines & related, and Morphine    Social History: TOBACCO:   reports that she has never smoked. She has never used smokeless tobacco.  ETOH:   reports no history of alcohol use. E-cigarette/Vaping       Questions Responses    E-cigarette/Vaping Use     Start Date     Passive Exposure     Quit Date     Counseling Given     Comments               Family History:      Family History reviewed with patient, and positive for heart attack        Problem Relation Age of Onset    High Blood Pressure Father     Migraines Father     Heart Attack Father         cause death    Breast Cancer Sister        REVIEW OF SYSTEMS:     Constitutional:  No Fever, No Chills, No Night Sweats  ENT/Mouth:  No Nasal Congestion,  No Hoarseness, No new mouth lesion  Eyes:  No Eye Pain, No Redness, No Discharge  Cardiovascular:  No Chest Pain, No Orthopnea, No Palpitations  Respiratory:  No Cough, No Sputum, No Dyspnea  Gastrointestinal: No Vomiting, No Diarrhea, No abdominal pain  Genitourinary: No Urinary Frequency, No Hematuria, No Urinary pain  Musculoskeletal: + Worsening Arthralgias, No worsening Myalgias  Skin:  No new Skin Lesions, No new skin rash  Neuro:  No new weakness, No new numbness. Psych:  No suicial ideation, No Violence ideation    PHYSICAL EXAM PERFORMED:    BP (!) 162/81   Pulse 90   Temp 99.3 °F (37.4 °C) (Oral)   Resp 18   Ht 5' 1\" (1.549 m)   Wt 148 lb (67.1 kg)   SpO2 95%   BMI 27.96 kg/m²     General appearance:  mild acute distress, appears older than stated age  HEENT:   atraumatic, sclera anicteric, Conjunctivae clear. Neck: Supple,Trachea midline, no goiter  Respiratory:minimal accessory muscle usage, Normal respiratory effort. Clear to auscultation, bilaterally without wheezing  Cardiovascular:  Regular rate and rhythm, capillary refill 2 seconds  Abdomen: Soft, non-tender, non-distended with normal bowel sounds. Musculoskeletal:  No clubbing, cyanosis. trace edema LE bilaterally.   Left ankle tenderness, erythema and warmth with pain with active and passive range of motion  Skin: turgor normal.  No new rashes or lesions. Neurologic: Alert and oriented x4, no new focal sensory/motor deficits. Labs:     Recent Labs     09/20/22  1708   WBC 13.7*   HGB 14.6   HCT 43.2        Recent Labs     09/20/22  1708   *   K 4.7   CL 89*   CO2 26   BUN 20   CREATININE 1.0   CALCIUM 9.8     Recent Labs     09/20/22  1708   AST 23   ALT 25   BILITOT 0.9   ALKPHOS 97     No results for input(s): INR in the last 72 hours. No results for input(s): Kathyrn Belch in the last 72 hours. Urinalysis:      Lab Results   Component Value Date/Time    NITRU Negative 09/20/2022 08:32 PM    WBCUA 0-2 09/20/2022 08:32 PM    BACTERIA Rare 08/30/2017 08:18 PM    RBCUA 0-2 09/20/2022 08:32 PM    BLOODU TRACE-INTACT 09/20/2022 08:32 PM    SPECGRAV 1.010 09/20/2022 08:32 PM    GLUCOSEU Negative 09/20/2022 08:32 PM       Radiology:     CXR: I have reviewed the CXR with the following interpretation:   No acute process left pneumectomy  EKG:  I have reviewed the EKG with the following interpretation:   Pending  XR CHEST PORTABLE   Final Result   1. No acute cardiopulmonary findings. 2.  Status post left pneumonectomy. XR ANKLE LEFT (MIN 3 VIEWS)   Final Result   Soft tissue swelling surrounding the ankle with no other acute findings. Ossifications medial malleolus that could be developmental or related to an   old injury. Moderate calcaneal spurring.              ASSESSMENT AND PLAN:    Active Hospital Problems    Diagnosis Date Noted    Sepsis [facility]) [A41.9] 09/20/2022     Priority: Medium     Sepsis: Tachycardia, leukocytosis  This occurring while patient was on Macrobid  Left ankle inflammatory response likely pseudogout  Blood cultures x3, echocardiogram given night sweats despite being on antibiotic with recent dental procedure  Aspirate obtained in the ED showing 14 K 24% neutrophilic predominant this was while patient was on antibiotic  Did notice the patient have calcium crystals which suggests suspicion of pseudogout  But also patient currently presenting with sepsis in the setting of left joint inflammatory presentation, noted 14 K nucleated cell is technically not high enough to be septic arthritis however patient has been on antibiotics, which could give the low WBC white count on aspirate and does have a negative gram stain thus will treat empirically with vancomycin and ceftriaxone for now pending culture  In regards to the calcium pseudogout presentation, I will order PTH, iron, mag phos calcium GERI for further work-up    Left ankle inflammatory joint:  Empirically treated for septic arthritis  Orthopedic consulted for further work-up and potentially intra-articular glucocorticoid injection once septic ruled out  Cultures pending, gram stain negative    Calcium pyrophosphate crystal deposition disease:  Etiology unclear at this time, no prior history of this  Further work-up ordered  Noted patient does have chronic arthritis and takes NSAIDs  Supportive care in the meantime    Hyponatremia: Serum osm, urine lites pending  Received bolus in the ED  Continue recheck    Essential Hypertension: Held home medication  Emphysema: Not in acute exacerbation, continue inhalers home  Chronic osteoarthritis on NSAIDs, held for now    Diet: NPO except meds ordered    DVT Prophylaxis: Held    Dispo:   Expected LOS greater than two [place], DO

## 2022-09-22 LAB
A/G RATIO: 1 (ref 1.1–2.2)
ALBUMIN SERPL-MCNC: 3.1 G/DL (ref 3.4–5)
ALP BLD-CCNC: 73 U/L (ref 40–129)
ALT SERPL-CCNC: 13 U/L (ref 10–40)
ANION GAP SERPL CALCULATED.3IONS-SCNC: 8 MMOL/L (ref 3–16)
AST SERPL-CCNC: 14 U/L (ref 15–37)
BASOPHILS ABSOLUTE: 0.1 K/UL (ref 0–0.2)
BASOPHILS RELATIVE PERCENT: 0.7 %
BILIRUB SERPL-MCNC: 0.5 MG/DL (ref 0–1)
BUN BLDV-MCNC: 9 MG/DL (ref 7–20)
CALCIUM SERPL-MCNC: 8.5 MG/DL (ref 8.3–10.6)
CHLORIDE BLD-SCNC: 99 MMOL/L (ref 99–110)
CO2: 26 MMOL/L (ref 21–32)
CREAT SERPL-MCNC: 0.6 MG/DL (ref 0.6–1.2)
EOSINOPHILS ABSOLUTE: 0.1 K/UL (ref 0–0.6)
EOSINOPHILS RELATIVE PERCENT: 1.2 %
GFR AFRICAN AMERICAN: >60
GFR NON-AFRICAN AMERICAN: >60
GLUCOSE BLD-MCNC: 110 MG/DL (ref 70–99)
HCT VFR BLD CALC: 35.7 % (ref 36–48)
HEMOGLOBIN: 12 G/DL (ref 12–16)
LYMPHOCYTES ABSOLUTE: 1.1 K/UL (ref 1–5.1)
LYMPHOCYTES RELATIVE PERCENT: 10.5 %
MCH RBC QN AUTO: 29 PG (ref 26–34)
MCHC RBC AUTO-ENTMCNC: 33.6 G/DL (ref 31–36)
MCV RBC AUTO: 86.4 FL (ref 80–100)
MONOCYTES ABSOLUTE: 1.3 K/UL (ref 0–1.3)
MONOCYTES RELATIVE PERCENT: 13 %
NEUTROPHILS ABSOLUTE: 7.7 K/UL (ref 1.7–7.7)
NEUTROPHILS RELATIVE PERCENT: 74.6 %
PATH CONSULT FLUID: NORMAL
PDW BLD-RTO: 14.7 % (ref 12.4–15.4)
PLATELET # BLD: 286 K/UL (ref 135–450)
PMV BLD AUTO: 6.7 FL (ref 5–10.5)
POTASSIUM REFLEX MAGNESIUM: 3.8 MMOL/L (ref 3.5–5.1)
RBC # BLD: 4.14 M/UL (ref 4–5.2)
SODIUM BLD-SCNC: 133 MMOL/L (ref 136–145)
TOTAL PROTEIN: 6.1 G/DL (ref 6.4–8.2)
VANCOMYCIN TROUGH: 6.4 UG/ML (ref 10–20)
WBC # BLD: 10.3 K/UL (ref 4–11)

## 2022-09-22 PROCEDURE — 94640 AIRWAY INHALATION TREATMENT: CPT

## 2022-09-22 PROCEDURE — 96366 THER/PROPH/DIAG IV INF ADDON: CPT

## 2022-09-22 PROCEDURE — 97530 THERAPEUTIC ACTIVITIES: CPT

## 2022-09-22 PROCEDURE — 36415 COLL VENOUS BLD VENIPUNCTURE: CPT

## 2022-09-22 PROCEDURE — 96372 THER/PROPH/DIAG INJ SC/IM: CPT

## 2022-09-22 PROCEDURE — 96375 TX/PRO/DX INJ NEW DRUG ADDON: CPT

## 2022-09-22 PROCEDURE — 6360000002 HC RX W HCPCS: Performed by: INTERNAL MEDICINE

## 2022-09-22 PROCEDURE — 96361 HYDRATE IV INFUSION ADD-ON: CPT

## 2022-09-22 PROCEDURE — 6370000000 HC RX 637 (ALT 250 FOR IP)

## 2022-09-22 PROCEDURE — 6370000000 HC RX 637 (ALT 250 FOR IP): Performed by: NURSE PRACTITIONER

## 2022-09-22 PROCEDURE — 6370000000 HC RX 637 (ALT 250 FOR IP): Performed by: INTERNAL MEDICINE

## 2022-09-22 PROCEDURE — 80202 ASSAY OF VANCOMYCIN: CPT

## 2022-09-22 PROCEDURE — 99232 SBSQ HOSP IP/OBS MODERATE 35: CPT | Performed by: SPECIALIST/TECHNOLOGIST

## 2022-09-22 PROCEDURE — 1200000000 HC SEMI PRIVATE

## 2022-09-22 PROCEDURE — G0378 HOSPITAL OBSERVATION PER HR: HCPCS

## 2022-09-22 PROCEDURE — 2580000003 HC RX 258: Performed by: INTERNAL MEDICINE

## 2022-09-22 PROCEDURE — 6360000002 HC RX W HCPCS: Performed by: SPECIALIST/TECHNOLOGIST

## 2022-09-22 PROCEDURE — 97166 OT EVAL MOD COMPLEX 45 MIN: CPT

## 2022-09-22 PROCEDURE — 97116 GAIT TRAINING THERAPY: CPT

## 2022-09-22 PROCEDURE — 80053 COMPREHEN METABOLIC PANEL: CPT

## 2022-09-22 PROCEDURE — 6360000002 HC RX W HCPCS

## 2022-09-22 PROCEDURE — 85025 COMPLETE CBC W/AUTO DIFF WBC: CPT

## 2022-09-22 PROCEDURE — APPNB30 APP NON BILLABLE TIME 0-30 MINS: Performed by: SPECIALIST/TECHNOLOGIST

## 2022-09-22 PROCEDURE — 2580000003 HC RX 258

## 2022-09-22 PROCEDURE — 97162 PT EVAL MOD COMPLEX 30 MIN: CPT

## 2022-09-22 RX ORDER — KETOROLAC TROMETHAMINE 30 MG/ML
15 INJECTION, SOLUTION INTRAMUSCULAR; INTRAVENOUS EVERY 6 HOURS PRN
Status: DISCONTINUED | OUTPATIENT
Start: 2022-09-22 | End: 2022-09-23

## 2022-09-22 RX ORDER — ENOXAPARIN SODIUM 100 MG/ML
40 INJECTION SUBCUTANEOUS DAILY
Status: DISCONTINUED | OUTPATIENT
Start: 2022-09-22 | End: 2022-09-24 | Stop reason: HOSPADM

## 2022-09-22 RX ADMIN — Medication 2 PUFF: at 08:26

## 2022-09-22 RX ADMIN — VANCOMYCIN HYDROCHLORIDE 1000 MG: 1 INJECTION, POWDER, LYOPHILIZED, FOR SOLUTION INTRAVENOUS at 16:03

## 2022-09-22 RX ADMIN — FLUTICASONE PROPIONATE 2 SPRAY: 50 SPRAY, METERED NASAL at 08:51

## 2022-09-22 RX ADMIN — Medication 2 PUFF: at 19:28

## 2022-09-22 RX ADMIN — SODIUM CHLORIDE: 9 INJECTION, SOLUTION INTRAVENOUS at 21:46

## 2022-09-22 RX ADMIN — ACETAMINOPHEN 650 MG: 325 TABLET ORAL at 06:35

## 2022-09-22 RX ADMIN — COLCHICINE 0.6 MG: 0.6 TABLET, FILM COATED ORAL at 08:52

## 2022-09-22 RX ADMIN — SODIUM CHLORIDE: 9 INJECTION, SOLUTION INTRAVENOUS at 04:44

## 2022-09-22 RX ADMIN — AMLODIPINE BESYLATE 5 MG: 5 TABLET ORAL at 20:17

## 2022-09-22 RX ADMIN — KETOROLAC TROMETHAMINE 15 MG: 30 INJECTION, SOLUTION INTRAMUSCULAR at 15:55

## 2022-09-22 RX ADMIN — ENOXAPARIN SODIUM 40 MG: 100 INJECTION SUBCUTANEOUS at 15:55

## 2022-09-22 RX ADMIN — COLCHICINE 0.6 MG: 0.6 TABLET, FILM COATED ORAL at 20:17

## 2022-09-22 RX ADMIN — CETIRIZINE HYDROCHLORIDE 10 MG: 10 TABLET, FILM COATED ORAL at 20:16

## 2022-09-22 RX ADMIN — CEFTRIAXONE SODIUM 1000 MG: 1 INJECTION, POWDER, FOR SOLUTION INTRAMUSCULAR; INTRAVENOUS at 20:20

## 2022-09-22 ASSESSMENT — PAIN SCALES - GENERAL
PAINLEVEL_OUTOF10: 0
PAINLEVEL_OUTOF10: 5
PAINLEVEL_OUTOF10: 0
PAINLEVEL_OUTOF10: 4

## 2022-09-22 ASSESSMENT — PAIN DESCRIPTION - ORIENTATION: ORIENTATION: LEFT

## 2022-09-22 ASSESSMENT — PAIN DESCRIPTION - DESCRIPTORS
DESCRIPTORS: ACHING
DESCRIPTORS: ACHING

## 2022-09-22 ASSESSMENT — PAIN DESCRIPTION - LOCATION
LOCATION: ANKLE
LOCATION: HEAD

## 2022-09-22 NOTE — PROGRESS NOTES
06:02 AM    CO2 26 09/22/2022 06:02 AM    BUN 9 09/22/2022 06:02 AM    CREATININE 0.6 09/22/2022 06:02 AM    GLUCOSE 110 09/22/2022 06:02 AM    CALCIUM 8.5 09/22/2022 06:02 AM            Assessment:      left ankle pseudogout. Plan:      1:  WBAT to the LLE with assistive device. Joint fluid cultures continue NGTD, pt still febrile overnight.  Still low to no suspicion for septic ankle joint with cell count of 14,000.   2:  Continue Deep venous thrombosis prophylaxis  3:  Continue Pain Control- pt with anaphylactic rxn to morphine related drugs, added toradol for pain control  4: PT/OT recommending SNF, CM following  5: hospitalist team management for pseudogout    JIGNA Vuong

## 2022-09-22 NOTE — PROGRESS NOTES
Hospitalist Progress Note      PCP: Amando Cabrera MD    Date of Admission: 9/20/2022    Chief Complaint: fever, joint swelling    Hospital Course: [de-identified] y.o. female who presented to [facility] with past medical history of hypertension, emphysema, osteoarthritis, asthma presented to the ED with chief complaint of left ankle pain    Patient reported that she had teeth extraction almost a month ago she is doing took antibiotics unclear which type completed but has reported that since then has had elbow pain and bilateral wrist pain no known alleviating factor and occurred when she woke up in the morning. Then the symptoms resolved and the patient been having intermittent night sweats and intermittent fevers was noted to have a UTI and was started on Macrobid which the patient has been taking daily. Patient then came here after waking up in the morning with left ankle being more swollen no known alleviating or exacerbating factor patient. Patient denied having any autoimmune disease, or prior history of joint swelling in the past.     Subjective: denies chest pain, dyspnea, n/v/d, dysuria, fever, chills, headache.  Reports improved left ankle pain and mobility      Medications:  Reviewed    Infusion Medications    sodium chloride 75 mL/hr at 09/22/22 0444    sodium chloride       Scheduled Medications    vancomycin  1,000 mg IntraVENous Q12H    cefTRIAXone (ROCEPHIN) IV  1,000 mg IntraVENous Q24H    fluticasone  2 spray Each Nostril Daily    mometasone-formoterol  2 puff Inhalation BID    colchicine  0.6 mg Oral BID    amLODIPine  5 mg Oral Nightly    cetirizine  10 mg Oral Daily    sodium chloride flush  10 mL IntraVENous 2 times per day     PRN Meds: albuterol sulfate HFA, sodium chloride flush, sodium chloride, promethazine **OR** ondansetron, acetaminophen **OR** acetaminophen      Intake/Output Summary (Last 24 hours) at 9/22/2022 1507  Last data filed at 9/22/2022 0655  Gross per 24 hour   Intake -- Output 1750 ml   Net -1750 ml       Physical Exam Performed:    /75   Pulse 90   Temp 98.6 °F (37 °C) (Oral)   Resp 14   Ht 5' 1\" (1.549 m)   Wt 160 lb 4.4 oz (72.7 kg)   SpO2 95%   BMI 30.28 kg/m²     General appearance: sitting in chair, comfortable. No apparent distress, appears stated age and cooperative. HEENT: Pupils equal, round, and reactive to light. Conjunctivae/corneas clear. Neck: Supple, with full range of motion. No jugular venous distention. Trachea midline. Respiratory:  Normal respiratory effort. Clear to auscultation, bilaterally without Rales/Wheezes/Rhonchi. Room air  Cardiovascular: Regular rate and rhythm with normal S1/S2 without, rubs or gallops. Murmur  Abdomen: Soft, non-tender, non-distended with normal bowel sounds. Musculoskeletal: No clubbing, cyanosis or edema bilaterally. Full range of motion without deformity. Left ankle warm, swollen, pain with palpation  Skin: Skin color, texture, turgor normal. No rashes or lesions. Neurologic: Neurovascularly intact without any focal sensory/motor deficits. Cranial nerves: II-XII intact, grossly non-focal.  Psychiatric: Alert and oriented, thought content appropriate, normal insight  Capillary Refill: Brisk, 3 seconds, normal   Peripheral Pulses: +2 palpable, equal bilaterally       Labs:   Recent Labs     09/20/22 1708 09/21/22  0642 09/22/22  0602   WBC 13.7* 10.7 10.3   HGB 14.6 12.7 12.0   HCT 43.2 37.8 35.7*    297 286     Recent Labs     09/20/22 1708 09/21/22  0642 09/22/22  0602   * 133* 133*   K 4.7 4.0 3.8   CL 89* 98* 99   CO2 26 24 26   BUN 20 15 9   CREATININE 1.0 0.7 0.6   CALCIUM 9.8 9.0 8.5   PHOS  --  2.8  --      Recent Labs     09/20/22 1708 09/21/22  0642 09/22/22  0602   AST 23 15 14*   ALT 25 14 13   BILITOT 0.9 0.7 0.5   ALKPHOS 97 76 73     No results for input(s): INR in the last 72 hours. No results for input(s): Amish Gooden in the last 72 hours.     Urinalysis:      Lab Results Component Value Date/Time    NITRU Negative 09/20/2022 08:32 PM    [address] 0-2 09/20/2022 08:32 PM    BACTERIA Rare 08/30/2017 08:18 PM    RBCUA 0-2 09/20/2022 08:32 PM    BLOODU TRACE-INTACT 09/20/2022 08:32 PM    SPECGRAV 1.010 09/20/2022 08:32 PM    GLUCOSEU Negative 09/20/2022 08:32 PM       Radiology:  XR CHEST PORTABLE   Final Result   1. No acute cardiopulmonary findings. 2.  Status post left pneumonectomy. XR ANKLE LEFT (MIN 3 VIEWS)   Final Result   Soft tissue swelling surrounding the ankle with no other acute findings. Ossifications medial malleolus that could be developmental or related to an   old injury. Moderate calcaneal spurring. Assessment/Plan:    Active Hospital Problems    Diagnosis     Sepsis ([facility] [address] [A41.9]      Priority: Medium     Sepsis, present on arrival  -Several possible sources: UTI, left ankle, recent tooth extraction  -Leukocytosis, tachycardia, elevated procal  -UA on arrival bland  -Cultures pending (blood and body fluid)  -Echo: EF 50-55%; G1 DD; no mention of vegetation    Pseudogout  -Fluid from left ankle sent for study: cloudy, yellow fluid; calcium pyrophosphate dihydrate crystals; extracellular crystals  -Vanc/ceftriaxone received in ED  -Continue abx for now, follow cultures  -Ortho consulted, appreciate recs  -ESR 51, .9  -Colchicine BID  -Monitor     Hyponatremia  -Na 130 on arrival, is now 133  -IV fluid bolus in ED  -Monitor    Hypertension, controlled  -Hold home meds  -Monitor    Recent tooth extraction  -Reported to have been ~1 month ago  -Took an abx following, but unsure of which it was    Emphysema  -Per chart review, 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

## 2022-09-22 NOTE — CARE COORDINATION
Chart reviewed Day 2. PT/OT rec SNF, list given to family/patient to give three preferences so we can start referrals. Patient potentially ready tomorrow for DC. Will continue to follow for needs. 82  Family gave me SNF choices 1. [facility] 2. [facility] 3. [facility] [place] sent.     Phoebe Chaudhari RN

## 2022-09-22 NOTE — PROGRESS NOTES
Physical Therapy  Facility/Department: [facility] C5 - MED SURG/ORTHO  Physical Therapy Initial Assessment    Name: Rebeka Dickey  : 1940  MRN: [id number]  Date of Service: 2022    Discharge Recommendations:  Subacute/Skilled Nursing Facility   PT Equipment Recommendations  Equipment Needed: No  Other: TBD at SNF      Patient Diagnosis(es): The primary encounter diagnosis was Recurrent fever. A diagnosis of Pseudogout was also pertinent to this visit. Past Medical History:  has a past medical history of Asthma, Ear discomfort, Hepatitis C, Hypertension, Liver disease, Lung disorder, Macular degeneration, Osteoarthritis, and Skull anomaly. Past Surgical History:  has a past surgical history that includes Appendectomy; Lung surgery; Appendectomy; Cochlear implant (); Cardiac catheterization (); Hysterectomy (); shoulder surgery; shoulder surgery (Left); Colonoscopy (2017); eye surgery; Tonsillectomy; and skin biopsy. Assessment   Body Structures, Functions, Activity Limitations Requiring Skilled Therapeutic Intervention: Decreased functional mobility ; Decreased ROM; Decreased strength;Decreased endurance;Decreased balance; Increased pain  Assessment: Pt referred for PT evaluation during current hospital stay with dx of pseudo-gout of L ankle. Pt currently functioning below her baseline, requiring assist ranging from modA x 2 initially to Joselito x 1-2 toward end of session for functional mobility tasks and gait. Pt initially very hesitant and fearful to stand and WB through LLE, although balance and level of (I) improved over course of session with repetition. Recommend SNF at D/C in light of current deficits and lack of consistent 24-hr assist at home. Pt & family agreeable to current D/C recommendations.   Treatment Diagnosis: Decreased LE strength and (I) with functional mobility  Specific Instructions for Next Treatment: Progress ther ex and mobility as tolerated  Therapy Prognosis: Good  Decision Making: Medium Complexity  Requires PT Follow-Up: Yes  Activity Tolerance: Patient tolerated evaluation without incident;Patient tolerated treatment well;Patient limited by pain     Plan   Plan: 3-5 times per week  Specific Instructions for Next Treatment: Progress ther ex and mobility as tolerated  Current Treatment Recommendations: Strengthening, Balance training, Functional mobility training, Transfer training, Endurance training, Gait training, Home exercise program, Safety education & training, Patient/Caregiver education & training, Equipment evaluation, education, & procurement, Therapeutic activities  Safety Devices: All fall risk precautions in place, Call light within reach, Chair alarm in place, Gait belt, Patient at risk for falls, Left in chair, Nurse notified     Restrictions  Restrictions/Precautions  Restrictions/Precautions: Weight Bearing, General Precautions, Fall Risk  Lower Extremity Weight Bearing Restrictions  Left Lower Extremity Weight Bearing: Weight Bearing As Tolerated  Position Activity Restriction  Other position/activity restrictions: WBAT to LLE with assistive device per ortho note 9/21/22, IV lines, telemetry, high fall risk per nursing assessment     Subjective   Pain: Pt with no c/o of pain at rest, reports she is \"feeling stiff. \"  Pt with c/o acute pain in L ankle/foot when standing although did not rate on 0-10 scale. General  Chart Reviewed: Yes  Patient assessed for rehabilitation services?: Yes  Family / Caregiver Present: Yes (son-in-law)  Referring Practitioner: FLOR Dennis CNP  Referral Date : 09/21/22  Diagnosis: Pseudogout of L ankle  Follows Commands: Within Functional Limits  General Comment: Pt resting in bed upon entry of therapy staff  Subjective: Pt agreeable to work with PT this morning, pleasant and cooperative.   States she's a little fearful of transferring OOB due to anxiety re: fear of falling but she's still willing to standing and 5 reps of standing marching at RW with Joselito x 2 as part of pre-gait warmup)    Transfer Training  Interventions: Safety awareness training;Verbal cues; Visual cues  Sit to Stand: Moderate assistance;Assist X2 (modA x 2 decreasing to Joselito x 2 with repetition)  Stand to Sit: Minimum assistance  Bed to Chair: Minimum assistance;Assist X2 (using RW)    Gait Training  Overall Level of Assistance: Minimum assistance;Assist X2 (Joselito x 2 decreasing to Joselito x 1 with repetition)  Interventions: Safety awareness training;Verbal cues; Visual cues  Speed/Cassia: Pace decreased (< 100 feet/min); Shuffled; Slow  Step Length: Left shortened;Right shortened  Stance: Left decreased;Time (2* pain)  Gait Abnormalities: Antalgic;Decreased step clearance; Step to gait (slightly antalgic gait observed during L stance, although degree of limp improved with repetition)  Distance (ft): 25 Feet  Assistive Device: Walker, rolling;Gait belt    AM-PAC Score  AM-PAC Inpatient Mobility Raw Score : 12 (09/22/22 1122)  AM-PAC Inpatient T-Scale Score : 35.33 (09/22/22 1122)  Mobility Inpatient CMS 0-100% Score: 68.66 (09/22/22 1122)  Mobility Inpatient CMS G-Code Modifier : CL (09/22/22 1122)    Goals  Short Term Goals  Time Frame for Short term goals: 1 week, 9/29/22 (unless otherwise specified)  Short term goal 1: Pt will transfer supine <-> sit with SBA  Short term goal 2: Pt will transfer sit <-> stand and bed>chair using RW with SBA  Short term goal 3: Pt will ambulate x 60 feet using RW with CGA x 1  Short term goal 4: By 9/25/22: Pt will tolerate 12-15 reps BLE exercise for strengthening, balance, and endurance  Patient Goals   Patient goals : \"To get stronger and to be able to eventually go home\"       Education  Patient Education  Education Given To: Patient  Education Provided: Role of Therapy;Precautions;Transfer Training;Plan of Care;Family Education;Equipment; Fall Prevention Strategies  Education Provided Comments: Disease-specific education: Pt educated on role of PT and benefits of regular OOB activity in order to prevent complications of prolonged bedrest; pt and son-in-law verbalize understanding. Education Method: Demonstration;Verbal  Barriers to Learning: None  Education Outcome: Verbalized understanding;Demonstrated understanding;Continued education needed      Therapy Time   Individual Concurrent Group Co-treatment   Time In 0904         Time Out 0944         Minutes 40         Timed Code Treatment Minutes: [address], [place] #[id number]    If pt is unable to be seen after this session, please let this note serve as discharge summary. Please see case management note for discharge disposition. Thank you.

## 2022-09-22 NOTE — PROGRESS NOTES
Occupational Therapy  Facility/Department: [place] [address] - MED SURG/ORTHO  Occupational Therapy Initial Assessment & Treatment    Name: Colette Anderson  : 1940  MRN: [id number]  Date of Service: 2022    Discharge Recommendations:  Subacute/Skilled Nursing Facility  OT Equipment Recommendations  Other: Defer to facility to obtain       Patient Diagnosis(es): The primary encounter diagnosis was Recurrent fever. A diagnosis of Pseudogout was also pertinent to this visit. Past Medical History:  has a past medical history of Asthma, Ear discomfort, Hepatitis C, Hypertension, Liver disease, Lung disorder, Macular degeneration, Osteoarthritis, and Skull anomaly. Past Surgical History:  has a past surgical history that includes Appendectomy; Lung surgery; Appendectomy; Cochlear implant (); Cardiac catheterization (); Hysterectomy (); shoulder surgery; shoulder surgery (Left); Colonoscopy (2017); eye surgery; Tonsillectomy; and skin biopsy. Assessment   Performance deficits / Impairments: Decreased functional mobility ; Decreased ADL status; Decreased strength;Decreased endurance;Decreased balance;Decreased high-level IADLs  Assessment: Pt is a [de-identified] yo F who presented to [facility] for psuedogout of L ankle. Pt lives alone in single story home w/ 4 BAUTISTA. Pt IND w/ ADLs and func mob at baseline. Upon eval, pt completed bed mob w/ minAx2 and func mob/transfers w/ minAx2, grossly. Pt is presenting below her baseline and would benefit from inpatient OT services to improve deficit areas. Rec SNF at d/c to maximize pt's functional status and ensure safety prior to pt's return to home environment.   Prognosis: Good  Decision Making: Medium Complexity  REQUIRES OT FOLLOW-UP: Yes  Activity Tolerance  Activity Tolerance: Patient Tolerated treatment well;Patient limited by pain        AM-PAC Daily Activity Inpatient   How much help for putting on and taking off regular lower body clothing?: A Lot  How much help for Bathing?: A Lot  How much help for Toileting?: A Lot  How much help for putting on and taking off regular upper body clothing?: A Little  How much help for taking care of personal grooming?: A Little  How much help for eating meals?: None  AM-PAC Inpatient Daily Activity Raw Score: 16  AM-PAC Inpatient ADL T-Scale Score : 35.96  ADL Inpatient CMS 0-100% Score: 53.32  ADL Inpatient CMS G-Code Modifier : CK       Plan   Plan  Times per Week: 4-6x/wk  Current Treatment Recommendations: Strengthening, Balance training, Functional mobility training, Endurance training, Pain management, Safety education & training, Patient/Caregiver education & training, Self-Care / ADL       Restrictions  Restrictions/Precautions  Restrictions/Precautions: Weight Bearing, General Precautions, Fall Risk  Lower Extremity Weight Bearing Restrictions  Left Lower Extremity Weight Bearing: Weight Bearing As Tolerated  Position Activity Restriction  Other position/activity restrictions: WBAT to LLE with assistive device per ortho note 9/21/22, IV lines, telemetry, high fall risk per nursing assessment      Subjective   General  Chart Reviewed: Yes, Progress Notes, History and Physical  Patient assessed for rehabilitation services?: Yes  Subjective  Subjective: pt resting comfortably in bed using bedpan upon arrival, agreeable to eval  Pt with no c/o of pain at rest, reports she is \"feeling stiff. \"  Pt with c/o acute pain in L ankle/foot when standing although did not rate on 0-10 scale.       Social/Functional History  Social/Functional History  Lives With: Alone  Type of Home: House  Home Layout: One level, Laundry in basement  Home Access: Stairs to enter with rails  Entrance Stairs - Number of Steps: 4 BAUTISTA  Entrance Stairs - Rails: Both  Bathroom Shower/Tub: Tub/Shower unit  Bathroom Toilet: Standard  Bathroom Equipment: Grab bars in shower  Bathroom Accessibility: Accessible  Home Equipment: Maximo Metcalf  Has the patient had two or more falls in the

## 2022-09-23 LAB
A/G RATIO: 1 (ref 1.1–2.2)
ALBUMIN SERPL-MCNC: 3 G/DL (ref 3.4–5)
ALP BLD-CCNC: 74 U/L (ref 40–129)
ALT SERPL-CCNC: 14 U/L (ref 10–40)
ANION GAP SERPL CALCULATED.3IONS-SCNC: 8 MMOL/L (ref 3–16)
AST SERPL-CCNC: 14 U/L (ref 15–37)
BASOPHILS ABSOLUTE: 0.1 K/UL (ref 0–0.2)
BASOPHILS RELATIVE PERCENT: 0.7 %
BILIRUB SERPL-MCNC: <0.2 MG/DL (ref 0–1)
BUN BLDV-MCNC: 15 MG/DL (ref 7–20)
CALCIUM SERPL-MCNC: 8.6 MG/DL (ref 8.3–10.6)
CHLORIDE BLD-SCNC: 103 MMOL/L (ref 99–110)
CO2: 24 MMOL/L (ref 21–32)
CREAT SERPL-MCNC: 0.8 MG/DL (ref 0.6–1.2)
EOSINOPHILS ABSOLUTE: 0.2 K/UL (ref 0–0.6)
EOSINOPHILS RELATIVE PERCENT: 3.2 %
GFR AFRICAN AMERICAN: >60
GFR NON-AFRICAN AMERICAN: >60
GLUCOSE BLD-MCNC: 103 MG/DL (ref 70–99)
HCT VFR BLD CALC: 33.8 % (ref 36–48)
HEMOGLOBIN: 11.2 G/DL (ref 12–16)
LYMPHOCYTES ABSOLUTE: 1.2 K/UL (ref 1–5.1)
LYMPHOCYTES RELATIVE PERCENT: 14.7 %
MAGNESIUM: 2.1 MG/DL (ref 1.8–2.4)
MCH RBC QN AUTO: 28.7 PG (ref 26–34)
MCHC RBC AUTO-ENTMCNC: 33.1 G/DL (ref 31–36)
MCV RBC AUTO: 86.8 FL (ref 80–100)
MONOCYTES ABSOLUTE: 1 K/UL (ref 0–1.3)
MONOCYTES RELATIVE PERCENT: 13.2 %
NEUTROPHILS ABSOLUTE: 5.4 K/UL (ref 1.7–7.7)
NEUTROPHILS RELATIVE PERCENT: 68.2 %
PDW BLD-RTO: 14.6 % (ref 12.4–15.4)
PLATELET # BLD: 315 K/UL (ref 135–450)
PMV BLD AUTO: 6.4 FL (ref 5–10.5)
POTASSIUM REFLEX MAGNESIUM: 3.4 MMOL/L (ref 3.5–5.1)
RBC # BLD: 3.9 M/UL (ref 4–5.2)
SODIUM BLD-SCNC: 135 MMOL/L (ref 136–145)
TOTAL PROTEIN: 6 G/DL (ref 6.4–8.2)
WBC # BLD: 7.9 K/UL (ref 4–11)

## 2022-09-23 PROCEDURE — 97530 THERAPEUTIC ACTIVITIES: CPT

## 2022-09-23 PROCEDURE — 96372 THER/PROPH/DIAG INJ SC/IM: CPT

## 2022-09-23 PROCEDURE — 6360000002 HC RX W HCPCS

## 2022-09-23 PROCEDURE — 1200000000 HC SEMI PRIVATE

## 2022-09-23 PROCEDURE — 97116 GAIT TRAINING THERAPY: CPT

## 2022-09-23 PROCEDURE — 97535 SELF CARE MNGMENT TRAINING: CPT

## 2022-09-23 PROCEDURE — G0378 HOSPITAL OBSERVATION PER HR: HCPCS

## 2022-09-23 PROCEDURE — 96366 THER/PROPH/DIAG IV INF ADDON: CPT

## 2022-09-23 PROCEDURE — 2580000003 HC RX 258

## 2022-09-23 PROCEDURE — 99231 SBSQ HOSP IP/OBS SF/LOW 25: CPT | Performed by: SPECIALIST/TECHNOLOGIST

## 2022-09-23 PROCEDURE — 6370000000 HC RX 637 (ALT 250 FOR IP): Performed by: NURSE PRACTITIONER

## 2022-09-23 PROCEDURE — 96361 HYDRATE IV INFUSION ADD-ON: CPT

## 2022-09-23 PROCEDURE — APPNB45 APP NON BILLABLE 31-45 MINUTES: Performed by: SPECIALIST/TECHNOLOGIST

## 2022-09-23 PROCEDURE — 36415 COLL VENOUS BLD VENIPUNCTURE: CPT

## 2022-09-23 PROCEDURE — 85025 COMPLETE CBC W/AUTO DIFF WBC: CPT

## 2022-09-23 PROCEDURE — 94640 AIRWAY INHALATION TREATMENT: CPT

## 2022-09-23 PROCEDURE — 83735 ASSAY OF MAGNESIUM: CPT

## 2022-09-23 PROCEDURE — 97110 THERAPEUTIC EXERCISES: CPT

## 2022-09-23 PROCEDURE — 80053 COMPREHEN METABOLIC PANEL: CPT

## 2022-09-23 PROCEDURE — 6370000000 HC RX 637 (ALT 250 FOR IP)

## 2022-09-23 PROCEDURE — 6360000002 HC RX W HCPCS: Performed by: SPECIALIST/TECHNOLOGIST

## 2022-09-23 RX ORDER — SULFAMETHOXAZOLE AND TRIMETHOPRIM 800; 160 MG/1; MG/1
1 TABLET ORAL EVERY 12 HOURS SCHEDULED
Status: DISCONTINUED | OUTPATIENT
Start: 2022-09-23 | End: 2022-09-24 | Stop reason: HOSPADM

## 2022-09-23 RX ORDER — MELOXICAM 7.5 MG/1
15 TABLET ORAL DAILY
Status: DISCONTINUED | OUTPATIENT
Start: 2022-09-23 | End: 2022-09-24 | Stop reason: HOSPADM

## 2022-09-23 RX ORDER — POTASSIUM CHLORIDE 20 MEQ/1
20 TABLET, EXTENDED RELEASE ORAL ONCE
Status: COMPLETED | OUTPATIENT
Start: 2022-09-23 | End: 2022-09-23

## 2022-09-23 RX ORDER — SULFAMETHOXAZOLE AND TRIMETHOPRIM 800; 160 MG/1; MG/1
1 TABLET ORAL 2 TIMES DAILY
Qty: 14 TABLET | Refills: 0 | Status: SHIPPED | OUTPATIENT
Start: 2022-09-23 | End: 2022-09-24 | Stop reason: SDUPTHER

## 2022-09-23 RX ORDER — COLCHICINE 0.6 MG/1
0.6 TABLET ORAL 2 TIMES DAILY
Qty: 60 TABLET | Refills: 0 | Status: SHIPPED | OUTPATIENT
Start: 2022-09-23 | End: 2022-09-24 | Stop reason: SDUPTHER

## 2022-09-23 RX ORDER — AMLODIPINE BESYLATE 5 MG/1
5 TABLET ORAL NIGHTLY
Qty: 30 TABLET | Refills: 0 | Status: SHIPPED | OUTPATIENT
Start: 2022-09-23 | End: 2022-09-24 | Stop reason: SDUPTHER

## 2022-09-23 RX ORDER — CEPHALEXIN 500 MG/1
500 CAPSULE ORAL 4 TIMES DAILY
Qty: 28 CAPSULE | Refills: 0 | Status: SHIPPED | OUTPATIENT
Start: 2022-09-23 | End: 2022-09-23 | Stop reason: HOSPADM

## 2022-09-23 RX ADMIN — FLUTICASONE PROPIONATE 2 SPRAY: 50 SPRAY, METERED NASAL at 08:07

## 2022-09-23 RX ADMIN — AMLODIPINE BESYLATE 5 MG: 5 TABLET ORAL at 20:21

## 2022-09-23 RX ADMIN — ENOXAPARIN SODIUM 40 MG: 100 INJECTION SUBCUTANEOUS at 08:04

## 2022-09-23 RX ADMIN — SODIUM CHLORIDE: 9 INJECTION, SOLUTION INTRAVENOUS at 15:42

## 2022-09-23 RX ADMIN — COLCHICINE 0.6 MG: 0.6 TABLET, FILM COATED ORAL at 08:05

## 2022-09-23 RX ADMIN — SULFAMETHOXAZOLE AND TRIMETHOPRIM 1 TABLET: 800; 160 TABLET ORAL at 20:21

## 2022-09-23 RX ADMIN — MELOXICAM 15 MG: 7.5 TABLET ORAL at 15:35

## 2022-09-23 RX ADMIN — Medication 2 PUFF: at 09:02

## 2022-09-23 RX ADMIN — POTASSIUM CHLORIDE 20 MEQ: 1500 TABLET, EXTENDED RELEASE ORAL at 15:35

## 2022-09-23 RX ADMIN — VANCOMYCIN HYDROCHLORIDE 1000 MG: 1 INJECTION, POWDER, LYOPHILIZED, FOR SOLUTION INTRAVENOUS at 04:14

## 2022-09-23 RX ADMIN — Medication 2 PUFF: at 19:44

## 2022-09-23 RX ADMIN — COLCHICINE 0.6 MG: 0.6 TABLET, FILM COATED ORAL at 20:21

## 2022-09-23 RX ADMIN — CETIRIZINE HYDROCHLORIDE 10 MG: 10 TABLET, FILM COATED ORAL at 20:21

## 2022-09-23 NOTE — PROGRESS NOTES
1:  WBAT to the LLE with assistive device. Joint fluid cultures continue NGTD, pt afebrile last 24hrs. Still low to no suspicion for septic ankle joint with cell count of 14,000.   2:  Continue Deep venous thrombosis prophylaxis  3:  Continue Pain Control- pt with anaphylactic rxn to morphine related drugs, added toradol for pain control  4: PT/OT recommending SNF, CM following  5: hospitalist team management for pseudogout  6: Ortho will sign off, please call with any questions or should the clinical picture change.  OK to DC from ortho standpoint    Wendy Hawk

## 2022-09-23 NOTE — CARE COORDINATION
Chart reviewed day 3. [facility] [place] [place] accepted patient and Madelyn Funez started cert with [employer], he will keep us posted. Will continue to follow.      Gonzales Payton RN

## 2022-09-23 NOTE — PROGRESS NOTES
Occupational Therapy  Facility/Department: [facility] C5 - MED SURG/ORTHO  Daily Co-Treatment Note  NAME: Rebeca Flood  : 1940  MRN: [id number]    Date of Service: 2022    Discharge Recommendations:  Subacute/Skilled Nursing Facility  OT Equipment Recommendations  Other: Defer to facility to obtain      Patient Diagnosis(es): The primary encounter diagnosis was Recurrent fever. A diagnosis of Pseudogout was also pertinent to this visit. Assessment    Assessment: Pt tolerated therapy well this date and made great progress towards goals. Pt seen as co-treatment w/ PT this date to safely meet goals and to maximize occupational performance outcomes greater in a co-treatment session than in 1:1 session. Pt completed bed mob w/ SBA and func mob/transfers w/ Joselito/CGA and RW. Pt ambulated community distance w/ minimal evidence of fatigue. Pt continues to benefit from skilled OT services while inpatient. Cont to rec SNF at d/c to maximize pt's functional status and safety prior to pt's return home. Activity Tolerance: Patient tolerated evaluation without incident;Patient tolerated treatment well;Patient limited by pain  Discharge Recommendations: Subacute/Skilled Nursing Facility  Other: Defer to facility to obtain     AM-PAC Daily Activity Inpatient   How much help for putting on and taking off regular lower body clothing?: A Lot  How much help for Bathing?: A Little  How much help for Toileting?: A Little  How much help for putting on and taking off regular upper body clothing?: A Little  How much help for taking care of personal grooming?: None  How much help for eating meals?: None  AM-PAC Inpatient Daily Activity Raw Score: 19  AM-PAC Inpatient ADL T-Scale Score : 40.22  ADL Inpatient CMS 0-100% Score: 42.8  ADL Inpatient CMS G-Code Modifier : CK      Plan   Plan  Times per Week: 4-6x/wk  Current Treatment Recommendations: Strengthening;Balance training;Functional mobility training; Endurance training;Pain cues  Speed/Cassia: Pace decreased (< 100 feet/min)  Distance (ft): 90 Feet (25')  Assistive Device: Walker, rolling;Gait belt     ADL  Grooming: Stand by assistance (in stance at sink)  UE Bathing Skilled Clinical Factors: comb hair  Toileting: Stand by assistance     Safety Devices  Type of Devices: All fall risk precautions in place;Call light within reach; Chair alarm in place;Gait belt;Patient at risk for falls; Left in chair;Nurse notified  Restraints  Restraints Initially in Place: No     Patient Education  Education Given To: Patient; Family  Education Provided: Role of Therapy;Plan of Care;Home Exercise Program;Precautions; ADL Adaptive Strategies;Transfer Training;Energy Conservation;IADL Safety; Fall Prevention Strategies  Education Method: Demonstration;Verbal  Barriers to Learning: None  Education Outcome: Verbalized understanding    Disease Specific Education: Pt educated on weight bearing status, post-op precautions, appropriate DME, and safe mobility with AD. Pt verbalized understanding     Goals  Short Term Goals  Time Frame for Short term goals: 1 week (9/29) unless otherwise noted  Short Term Goal 1: Pt will complete func mob/transfers w/ SBA and  LRAD  Short Term Goal 2: Pt will complete toilet transfer w/ Joselito and LRAD - goal met 9/23 CGA  Short Term Goal 3: Pt will complete LB dressing w/ CGA and LRAD (9/25)  Short Term Goal 4: Pt will complete x10 BUE therex to increase UE strength and support  Patient Goals   Patient goals : to walk again     Therapy Time   Individual Concurrent Group Co-treatment   Time In       1409   Time Out       1438   Minutes       29   Timed Code Treatment Minutes: 29 Minutes     If pt is unable to be seen after this session, please let this note serve as discharge summary. Please see case management note for discharge disposition. Thank you.      Jesika Regalado OTR/L

## 2022-09-23 NOTE — PROGRESS NOTES
Hospitalist Progress Note      PCP: Janelle Aviles MD    Date of Admission: 9/20/2022    Chief Complaint: fever, joint swelling    Hospital Course: [de-identified] y.o. female who presented to [facility] with past medical history of hypertension, emphysema, osteoarthritis, asthma presented to the ED with chief complaint of left ankle pain    Patient reported that she had teeth extraction almost a month ago she is doing took antibiotics unclear which type completed but has reported that since then has had elbow pain and bilateral wrist pain no known alleviating factor and occurred when she woke up in the morning. Then the symptoms resolved and the patient been having intermittent night sweats and intermittent fevers was noted to have a UTI and was started on Macrobid which the patient has been taking daily. Patient then came here after waking up in the morning with left ankle being more swollen no known alleviating or exacerbating factor patient. Patient denied having any autoimmune disease, or prior history of joint swelling in the past.     Subjective: denies chest pain, dyspnea, n/v/d, dysuria, fever, chills, headache.  Reports improved left ankle pain and mobility      Medications:  Reviewed    Infusion Medications    sodium chloride 75 mL/hr at 09/22/22 2146    sodium chloride       Scheduled Medications    sulfamethoxazole-trimethoprim  1 tablet Oral 2 times per day    potassium chloride  20 mEq Oral Once    meloxicam  15 mg Oral Daily    enoxaparin  40 mg SubCUTAneous Daily    fluticasone  2 spray Each Nostril Daily    mometasone-formoterol  2 puff Inhalation BID    colchicine  0.6 mg Oral BID    amLODIPine  5 mg Oral Nightly    cetirizine  10 mg Oral Daily    sodium chloride flush  10 mL IntraVENous 2 times per day     PRN Meds: albuterol sulfate HFA, sodium chloride flush, sodium chloride, promethazine **OR** ondansetron, acetaminophen **OR** acetaminophen      Intake/Output Summary (Last 24 hours) at 9/23/2022 1330  Last data filed at 9/23/2022 0842  Gross per 24 hour   Intake 120 ml   Output 1100 ml   Net -980 ml       Physical Exam Performed:    /77   Pulse 75   Temp 98.2 °F (36.8 °C) (Oral)   Resp 16   Ht 5' 1\" (1.549 m)   Wt 156 lb 8.4 oz (71 kg)   SpO2 96%   BMI 29.58 kg/m²     General appearance: sitting in chair, comfortable. No apparent distress, appears stated age and cooperative. HEENT: Pupils equal, round, and reactive to light. Conjunctivae/corneas clear. Neck: Supple, with full range of motion. No jugular venous distention. Trachea midline. Respiratory:  Normal respiratory effort. Clear to auscultation, bilaterally without Rales/Wheezes/Rhonchi. Room air  Cardiovascular: Regular rate and rhythm with normal S1/S2 without, rubs or gallops. Murmur  Abdomen: Soft, non-tender, non-distended with normal bowel sounds. Musculoskeletal: No clubbing, cyanosis or edema bilaterally. Full range of motion without deformity. Left ankle still with swelling, but improved  Skin: Skin color, texture, turgor normal. No rashes or lesions. Neurologic: Neurovascularly intact without any focal sensory/motor deficits. Cranial nerves: II-XII intact, grossly non-focal.  Psychiatric: Alert and oriented, thought content appropriate, normal insight  Capillary Refill: Brisk, 3 seconds, normal   Peripheral Pulses: +2 palpable, equal bilaterally       Labs:   Recent Labs     09/21/22  0642 09/22/22  0602 09/23/22  0649   WBC 10.7 10.3 7.9   HGB 12.7 12.0 11.2*   HCT 37.8 35.7* 33.8*    286 315     Recent Labs     09/21/22  0642 09/22/22  0602 09/23/22  0649   * 133* 135*   K 4.0 3.8 3.4*   CL 98* 99 103   CO2 24 26 24   BUN 15 9 15   CREATININE 0.7 0.6 0.8   CALCIUM 9.0 8.5 8.6   PHOS 2.8  --   --      Recent Labs     09/21/22  0642 09/22/22  0602 09/23/22  0649   AST 15 14* 14*   ALT 14 13 14   BILITOT 0.7 0.5 <0.2   ALKPHOS 76 73 74     No results for input(s): INR in the last 72 hours.   No results for input(s): Joelle Jones in the last 72 hours. Urinalysis:      Lab Results   Component Value Date/Time    NITRU Negative 09/20/2022 08:32 PM    WBCUA 0-2 09/20/2022 08:32 PM    BACTERIA Rare 08/30/2017 08:18 PM    RBCUA 0-2 09/20/2022 08:32 PM    BLOODU TRACE-INTACT 09/20/2022 08:32 PM    SPECGRAV 1.010 09/20/2022 08:32 PM    GLUCOSEU Negative 09/20/2022 08:32 PM       Radiology:  XR CHEST PORTABLE   Final Result   1. No acute cardiopulmonary findings. 2.  Status post left pneumonectomy. XR ANKLE LEFT (MIN 3 VIEWS)   Final Result   Soft tissue swelling surrounding the ankle with no other acute findings. Ossifications medial malleolus that could be developmental or related to an   old injury. Moderate calcaneal spurring.              Assessment/Plan:    Active Hospital Problems    Diagnosis     Sepsis ([facility] [address] [A41.9]      Priority: Medium     Sepsis, present on arrival  -Several possible sources: UTI, left ankle, recent tooth extraction  -Leukocytosis, tachycardia, elevated procal  -UA on arrival bland  -Cultures pending (blood and body fluid)  -Echo: EF 50-55%; G1 DD; no mention of vegetation    Pseudogout  -Fluid from left ankle sent for study: cloudy, yellow fluid; calcium pyrophosphate dihydrate crystals; extracellular crystals  -Vanc/ceftriaxone received in ED  -Transitioned to bactrim to complete course  -Ortho consulted, appreciate recs  -ESR 51, .9  -Colchicine BID  -Monitor     Hypokalemia  -Monitor and replete as needed    Hyponatremia  -Na 130 on arrival, is now 135  -IV fluid bolus in ED  -Monitor    Hypertension, controlled  -Resume amlodipine  -Monitor    Recent tooth extraction  -Reported to have been ~1 month ago  -Took an abx following, but unsure of which it was    Emphysema, stable  -Per chart review, 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

## 2022-09-23 NOTE — PROGRESS NOTES
Physical Therapy  Facility/Department: [facility] C5 - MED SURG/ORTHO  Daily Treatment Note  NAME: Fadia Felton  : 1940  MRN: [id number]    Date of Service: 2022    Discharge Recommendations:  Subacute/Skilled Nursing Facility   PT Equipment Recommendations  Equipment Needed: No  Other: TBD at SNF  [facility] 6 Clicks Inpatient Mobility:  AM-PAC Mobility Inpatient   How much difficulty turning over in bed?: A Little  How much difficulty sitting down on / standing up from a chair with arms?: A Little  How much difficulty moving from lying on back to sitting on side of bed?: A Little  How much help from another person moving to and from a bed to a chair?: A Little  How much help from another person needed to walk in hospital room?: A Little  How much help from another person for climbing 3-5 steps with a railing?: Total  AM-PAC Inpatient Mobility Raw Score : 16  AM-PAC Inpatient T-Scale Score : 40.78  Mobility Inpatient CMS 0-100% Score: 54.16  Mobility Inpatient CMS G-Code Modifier : CK    Patient Diagnosis(es): The primary encounter diagnosis was Recurrent fever. A diagnosis of Pseudogout was also pertinent to this visit. Assessment   Assessment: Pt requiring assist with mobility ranging from sba to cga/Joselito x 1. Pt improved tolerance for WB through LLE, although balance is still lacking making pt unsafe without assistance. Recommend SNF at D/C in light of current deficits and lack of consistent 24-hr assist at home. Activity Tolerance: Patient tolerated evaluation without incident;Patient tolerated treatment well;Patient limited by pain  Equipment Needed: No  Other: TBD at [facility]     Plan    Plan  Plan: 3-5 times per week  Specific Instructions for Next Treatment: Progress ther ex and mobility as tolerated  Current Treatment Recommendations: Strengthening;Balance training;Functional mobility training;Transfer training; Endurance training;Gait training;Home exercise program;Safety education & training;Patient/Caregiver education & training;Equipment evaluation, education, & procurement; Therapeutic activities     Restrictions  Restrictions/Precautions  Restrictions/Precautions: Weight Bearing, General Precautions, Fall Risk  Lower Extremity Weight Bearing Restrictions  Left Lower Extremity Weight Bearing: Weight Bearing As Tolerated  Position Activity Restriction  Other position/activity restrictions: WBAT to LLE with assistive device per ortho note 9/21/22, IV lines, telemetry, high fall risk per nursing assessment     Subjective    Subjective  Subjective: Pt agreeable to PT session  Pain: pt endorses stiffness, no pain  Orientation  Overall Orientation Status: Within Normal Limits     Objective   Vitals  Heart Rate: 80  BP: 113/62  BP Location: Right upper arm  MAP (Calculated): 79  SpO2: 96 %  O2 Device: None (Room air)  Bed Mobility Training  Bed Mobility Training: Yes  Interventions: Safety awareness training;Verbal cues; Visual cues  Supine to Sit: Stand-by assistance; Additional time (HOB elevated)  Sit to Supine:  (in chair at EOS)  Balance  Sitting: Intact  Standing: Impaired (rw)  Standing - Static: Constant support;Good  Standing - Dynamic: Constant support;Good  Transfer Training  Transfer Training: Yes  Interventions: Safety awareness training;Verbal cues; Visual cues  Sit to Stand: Contact-guard assistance;Minimum assistance; Additional time  Stand to Sit: Additional time;Contact-guard assistance  Bed to Chair: Contact-guard assistance; Additional time; Adaptive equipment (w/ RW)  Toilet Transfer: Adaptive equipment; Additional time;Contact-guard assistance  Gait Training  Gait Training: Yes  Gait  Overall Level of Assistance: Contact-guard assistance  Interventions: Safety awareness training;Verbal cues; Visual cues  Speed/Cassia: Pace decreased (< 100 feet/min)  Distance (ft): 90 Feet (25')  Assistive Device: Walker, rolling;Gait belt     PT Exercises  Exercise Treatment: performed BLE TE 15X EACH: AP, QS, GS, HIP ABD, LAQ, MARCHES       Safety Devices  Type of Devices: All fall risk precautions in place;Call light within reach; Chair alarm in place;Gait belt;Patient at risk for falls; Left in chair;Nurse notified       Goals  Short Term Goals  Time Frame for Short term goals: 1 week, 9/29/22 (unless otherwise specified)  Short term goal 1: Pt will transfer supine <-> sit with SBA -9/23 met  Short term goal 2: Pt will transfer sit <-> stand and bed>chair using RW with SBA  -9/23 cga/min  Short term goal 3: Pt will ambulate x 60 feet using RW with CGA x 1  -9/23 met  Short term goal 4: By 9/25/22: Pt will tolerate 12-15 reps BLE exercise for strengthening, balance, and endurance  -9/23 met  Patient Goals   Patient goals : \"To get stronger and to be able to eventually go home\"    Education  Patient Education  Education Given To: Patient  Education Provided: Role of Therapy;Precautions;Transfer Training;Plan of Care;Family Education;Equipment; Fall Prevention Strategies  Education Provided Comments: Disease-specific education: Pt educated on role of PT and benefits of regular OOB activity in order to prevent complications of prolonged bedrest; pt and son-in-law verbalize understanding.   Education Method: Demonstration;Verbal  Barriers to Learning: None  Education Outcome: Verbalized understanding;Demonstrated understanding;Continued education needed    Therapy Time   Individual Concurrent Group Co-treatment   Time In 1409         Time Out 1438         Minutes 29         Timed Code Treatment Minutes: [facility]

## 2022-09-24 VITALS
RESPIRATION RATE: 18 BRPM | DIASTOLIC BLOOD PRESSURE: 68 MMHG | OXYGEN SATURATION: 97 % | SYSTOLIC BLOOD PRESSURE: 115 MMHG | HEART RATE: 73 BPM | TEMPERATURE: 97.9 F | WEIGHT: 156.53 LBS | HEIGHT: 61 IN | BODY MASS INDEX: 29.55 KG/M2

## 2022-09-24 LAB
A/G RATIO: 1 (ref 1.1–2.2)
ALBUMIN SERPL-MCNC: 2.8 G/DL (ref 3.4–5)
ALP BLD-CCNC: 67 U/L (ref 40–129)
ALT SERPL-CCNC: 16 U/L (ref 10–40)
ANION GAP SERPL CALCULATED.3IONS-SCNC: 10 MMOL/L (ref 3–16)
AST SERPL-CCNC: 18 U/L (ref 15–37)
BASOPHILS ABSOLUTE: 0.1 K/UL (ref 0–0.2)
BASOPHILS RELATIVE PERCENT: 1.4 %
BILIRUB SERPL-MCNC: <0.2 MG/DL (ref 0–1)
BLOOD CULTURE, ROUTINE: NORMAL
BUN BLDV-MCNC: 12 MG/DL (ref 7–20)
CALCIUM SERPL-MCNC: 8.6 MG/DL (ref 8.3–10.6)
CHLORIDE BLD-SCNC: 104 MMOL/L (ref 99–110)
CO2: 22 MMOL/L (ref 21–32)
CREAT SERPL-MCNC: 0.6 MG/DL (ref 0.6–1.2)
CULTURE, BLOOD 2: NORMAL
EOSINOPHILS ABSOLUTE: 0.4 K/UL (ref 0–0.6)
EOSINOPHILS RELATIVE PERCENT: 6.2 %
GFR AFRICAN AMERICAN: >60
GFR NON-AFRICAN AMERICAN: >60
GLUCOSE BLD-MCNC: 94 MG/DL (ref 70–99)
HCT VFR BLD CALC: 33.4 % (ref 36–48)
HEMOGLOBIN: 11.1 G/DL (ref 12–16)
LYMPHOCYTES ABSOLUTE: 1 K/UL (ref 1–5.1)
LYMPHOCYTES RELATIVE PERCENT: 16 %
MCH RBC QN AUTO: 29 PG (ref 26–34)
MCHC RBC AUTO-ENTMCNC: 33.2 G/DL (ref 31–36)
MCV RBC AUTO: 87.3 FL (ref 80–100)
MONOCYTES ABSOLUTE: 0.8 K/UL (ref 0–1.3)
MONOCYTES RELATIVE PERCENT: 12.7 %
NEUTROPHILS ABSOLUTE: 4.1 K/UL (ref 1.7–7.7)
NEUTROPHILS RELATIVE PERCENT: 63.7 %
PDW BLD-RTO: 14.8 % (ref 12.4–15.4)
PLATELET # BLD: 309 K/UL (ref 135–450)
PMV BLD AUTO: 6.7 FL (ref 5–10.5)
POTASSIUM REFLEX MAGNESIUM: 4.3 MMOL/L (ref 3.5–5.1)
RBC # BLD: 3.82 M/UL (ref 4–5.2)
SODIUM BLD-SCNC: 136 MMOL/L (ref 136–145)
TOTAL PROTEIN: 5.7 G/DL (ref 6.4–8.2)
WBC # BLD: 6.4 K/UL (ref 4–11)

## 2022-09-24 PROCEDURE — 96372 THER/PROPH/DIAG INJ SC/IM: CPT

## 2022-09-24 PROCEDURE — 6370000000 HC RX 637 (ALT 250 FOR IP)

## 2022-09-24 PROCEDURE — 96361 HYDRATE IV INFUSION ADD-ON: CPT

## 2022-09-24 PROCEDURE — 85025 COMPLETE CBC W/AUTO DIFF WBC: CPT

## 2022-09-24 PROCEDURE — 2580000003 HC RX 258: Performed by: INTERNAL MEDICINE

## 2022-09-24 PROCEDURE — 80053 COMPREHEN METABOLIC PANEL: CPT

## 2022-09-24 PROCEDURE — 6360000002 HC RX W HCPCS: Performed by: SPECIALIST/TECHNOLOGIST

## 2022-09-24 PROCEDURE — G0378 HOSPITAL OBSERVATION PER HR: HCPCS

## 2022-09-24 PROCEDURE — 36415 COLL VENOUS BLD VENIPUNCTURE: CPT

## 2022-09-24 RX ORDER — AMLODIPINE BESYLATE 5 MG/1
5 TABLET ORAL NIGHTLY
Qty: 30 TABLET | Refills: 0 | Status: SHIPPED | OUTPATIENT
Start: 2022-09-24

## 2022-09-24 RX ORDER — MELOXICAM 15 MG/1
15 TABLET ORAL DAILY
Qty: 30 TABLET | Refills: 0 | Status: SHIPPED | OUTPATIENT
Start: 2022-09-24

## 2022-09-24 RX ORDER — COLCHICINE 0.6 MG/1
0.6 TABLET ORAL 2 TIMES DAILY
Qty: 60 TABLET | Refills: 0 | Status: SHIPPED | OUTPATIENT
Start: 2022-09-24

## 2022-09-24 RX ORDER — SULFAMETHOXAZOLE AND TRIMETHOPRIM 800; 160 MG/1; MG/1
1 TABLET ORAL 2 TIMES DAILY
Qty: 14 TABLET | Refills: 0 | Status: SHIPPED | OUTPATIENT
Start: 2022-09-24 | End: 2022-10-01

## 2022-09-24 RX ADMIN — FLUTICASONE PROPIONATE 2 SPRAY: 50 SPRAY, METERED NASAL at 08:58

## 2022-09-24 RX ADMIN — Medication 10 ML: at 08:58

## 2022-09-24 RX ADMIN — COLCHICINE 0.6 MG: 0.6 TABLET, FILM COATED ORAL at 08:55

## 2022-09-24 RX ADMIN — ENOXAPARIN SODIUM 40 MG: 100 INJECTION SUBCUTANEOUS at 08:56

## 2022-09-24 RX ADMIN — SULFAMETHOXAZOLE AND TRIMETHOPRIM 1 TABLET: 800; 160 TABLET ORAL at 08:55

## 2022-09-24 RX ADMIN — MELOXICAM 15 MG: 7.5 TABLET ORAL at 08:56

## 2022-09-24 NOTE — PLAN OF CARE
Problem: Discharge Planning  Goal: Discharge to home or other facility with appropriate resources  Outcome: Progressing     Problem: Pain  Goal: Verbalizes/displays adequate comfort level or baseline comfort level  Outcome: Progressing     Problem: Safety - Adult  Goal: Free from fall injury  9/24/2022 0909 by Blaine Hawkins RN  Outcome: Progressing  9/24/2022 0014 by Chau Scales RN  Outcome: Progressing     Problem: ABCDS Injury Assessment  Goal: Absence of physical injury  Outcome: Progressing

## 2022-09-24 NOTE — PROGRESS NOTES
IV removed. D/C instructions given. Pt verbalizes understanding and all questions answered. Medications picked up at pharmacy in main lobby. Patient walker and all belongings taken with patient at time of D/C.

## 2022-09-24 NOTE — PLAN OF CARE
Problem: Discharge Planning  Goal: Discharge to home or other facility with appropriate resources  9/24/2022 1529 by Carlos Acosta RN  Outcome: Completed  9/24/2022 0909 by Carlos Acosta RN  Outcome: Progressing     Problem: Pain  Goal: Verbalizes/displays adequate comfort level or baseline comfort level  9/24/2022 1529 by Carlos Acosta RN  Outcome: Completed  9/24/2022 0909 by Carlos Acosta RN  Outcome: Progressing     Problem: Safety - Adult  Goal: Free from fall injury  9/24/2022 1529 by Carlos Acosta RN  Outcome: Completed  9/24/2022 0909 by Carlos Acosta RN  Outcome: Progressing     Problem: ABCDS Injury Assessment  Goal: Absence of physical injury  9/24/2022 1529 by Carlos Acosta RN  Outcome: Completed  9/24/2022 0909 by Carlos Acosta RN  Outcome: Progressing

## 2022-09-24 NOTE — CARE COORDINATION
CASE MANAGEMENT DISCHARGE SUMMARY      Discharge to: home with [address] completed:   Hospital Exemption Notification (HENS) completed: IMM given: (date)     New Durable Medical Equipment ordered/agency: RW delivered to room through [address] [place] [address]. Transportation:    Family/car: Son      Confirmed discharge plan with:RN,[place], and son     Patient: yes/no     Family:  yes/no    Name: Contact number:     Facility/Agency, name:  [facility] faxed [place] pulling orders   Phone number for report to facility:      RN, name: Neli Oneal    Note: Discharging nurse to complete [facility], reconcile [facility], and place final copy with patient's discharge packet. RN to ensure that written prescriptions for  Level II medications are sent with patient to the facility as per protocol.

## 2022-09-24 NOTE — DISCHARGE SUMMARY
Vanc/ceftriaxone received in ED, Transitioned to bactrim to complete course. Ortho consulted, appreciate recs. ESR 51, .9. Colchicine BID. Follow up with PCP to determine further, or preventive, treatment. Hypokalemia, resolved. Monitored and replaced as needed     Hyponatremia, resolved. Na 130 on arrival, it is 136 at discharge. Hypertension, controlled. Continue home dose amlodipine. Pain was likely contributing to elevated readings. Recent tooth extraction. Reported to have been ~1 month ago. Took an abx following, but unsure of which it was. No issues noted with teeth or oral cavity     Emphysema, stable. 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 No evidence of exacerbation at this time. Covid negative. Continue home regimen     Osteoarthritis. Uses NSAID at home. Will discharge with meloxicam 15mg for pain management. Physical Exam Performed:     /75   Pulse 89   Temp 97.6 °F (36.4 °C) (Oral)   Resp 18   Ht 5' 1\" (1.549 m)   Wt 156 lb 8.4 oz (71 kg)   SpO2 96%   BMI 29.58 kg/m²       General appearance: sitting in chair, eating lunch. No apparent distress, appears stated age and cooperative. HEENT: Normal cephalic, atraumatic without obvious deformity. Pupils equal, round, and reactive to light. Extra ocular muscles intact. Conjunctivae/corneas clear. Neck: Supple, with full range of motion. No jugular venous distention. Trachea midline. Respiratory: Normal respiratory effort. Clear to auscultation, bilaterally without Rales/Wheezes/Rhonchi. Room air  Cardiovascular: Regular rate and rhythm with normal S1/S2 without murmurs, rubs or gallops. Abdomen: Soft, non-tender, non-distended with normal bowel sounds. Musculoskeletal: No clubbing, cyanosis or edema bilaterally. Full range of motion without deformity. Left ankle swelling, improved. Skin: Skin color, texture, turgor normal. No rashes or lesions.   Neurologic: Neurovascularly intact without any focal sensory/motor deficits. Cranial nerves: II-XII intact, grossly non-focal.  Psychiatric: Alert and oriented, thought content appropriate, normal insight  Capillary Refill: Brisk,< 3 seconds   Peripheral Pulses: +2 palpable, equal bilaterally       Labs: For convenience and continuity at follow-up the following most recent labs are provided:      CBC:    Lab Results   Component Value Date/Time    WBC 6.4 09/24/2022 05:55 AM    HGB 11.1 09/24/2022 05:55 AM    HCT 33.4 09/24/2022 05:55 AM     09/24/2022 05:55 AM       Renal:    Lab Results   Component Value Date/Time     09/24/2022 05:55 AM    K 4.3 09/24/2022 05:55 AM     09/24/2022 05:55 AM    CO2 22 09/24/2022 05:55 AM    BUN 12 09/24/2022 05:55 AM    CREATININE 0.6 09/24/2022 05:55 AM    CALCIUM 8.6 09/24/2022 05:55 AM    PHOS 2.8 09/21/2022 06:42 AM         Significant Diagnostic Studies    Radiology:   XR CHEST PORTABLE   Final Result   1. No acute cardiopulmonary findings. 2.  Status post left pneumonectomy. XR ANKLE LEFT (MIN 3 VIEWS)   Final Result   Soft tissue swelling surrounding the ankle with no other acute findings. Ossifications medial malleolus that could be developmental or related to an   old injury. Moderate calcaneal spurring. Consults:     IP CONSULT TO HOSPITALIST  PHARMACY TO DOSE VANCOMYCIN  IP CONSULT TO ORTHOPEDIC SURGERY  IP CONSULT TO HOME CARE NEEDS    Disposition: home     Condition at Discharge: Stable    Discharge Instructions/Follow-up:  PCP within 1 week.      Code Status:  Full Code     Activity: activity as tolerated    Diet: regular diet      Discharge Medications:     Current Discharge Medication List             Details   sulfamethoxazole-trimethoprim (BACTRIM DS;SEPTRA DS) 800-160 MG per tablet Take 1 tablet by mouth 2 times daily for 7 days  Qty: 14 tablet, Refills: 0      colchicine (COLCRYS) 0.6 MG tablet Take 1 tablet by mouth 2 times daily  Qty: 60 tablet, Refills: 0 Details   amLODIPine (NORVASC) 5 MG tablet Take 1 tablet by mouth nightly  Qty: 30 tablet, Refills: 0                Details   meloxicam (MOBIC) 15 MG tablet Take 1 tablet by mouth daily  Qty: 30 tablet, Refills: 3      Boswellia-Glucosamine-Vit D (OSTEO BI-FLEX ONE PER DAY PO) Take by mouth      fluticasone (FLONASE) 50 MCG/ACT nasal spray USE 2 SPRAYS NASALLY DAILY      mometasone-formoterol (DULERA) 200-5 MCG/ACT inhaler Inhale 2 puffs into the lungs      NONFORMULARY AREDS eye vitamins      albuterol (PROVENTIL HFA) 108 (90 BASE) MCG/ACT inhaler Inhale 2 puffs into the lungs every 6 hours as needed for Wheezing. Qty: 1 Inhaler, Refills: 0      loratadine (CLARITIN) 10 MG capsule Take 10 mg by mouth at bedtime Clarify dose             Time Spent on discharge is more than 45 minutes in the examination, evaluation, counseling and review of medications and discharge plan. Signed:    FLOR Larson - CNP   9/24/2022      Thank you Maxi Nixon MD for the opportunity to be involved in this patient's care. If you have any questions or concerns, please feel free to contact me at [phone].

## 2022-09-24 NOTE — DISCHARGE INSTR - COC
Continuity of Care Form    Patient Name: Rebeca Flood   :  1940  MRN:  [id number]    Admit date:  2022  Discharge date:  2022    Code Status Order: Full Code   Advance Directives:     Admitting Physician:  Kerry Jurado DO  PCP: Merry Vázquez MD    Discharging Nurse: Donald Black 23 Unit/Room#: 2655/1878-36  Discharging Unit Phone Number: [phone]    Emergency Contact:   Extended Emergency Contact Information  Primary Emergency Contact: [address] [place] [address] Phone: [phone]  Mobile Phone: [phone]  Relation: Child  Secondary Emergency Contact: Ronaldo Casillas  [place] Phone: [phone]  Relation: Child    Past Surgical History:  Past Surgical History:   Procedure Laterality Date    APPENDECTOMY      APPENDECTOMY      CARDIAC CATHETERIZATION      COCHLEAR IMPLANT      COLONOSCOPY  2017    polyp    EYE SURGERY      HYSTERECTOMY (CERVIX STATUS UNKNOWN)  1994    LUNG SURGERY      Left lung complete removal    SHOULDER SURGERY      right shoulder- no block given due to only having one lung    SHOULDER SURGERY Left     video arthroscopy, rotator cuff repair, biceps tenodesis    SKIN BIOPSY      TONSILLECTOMY         Immunization History:   Immunization History   Administered Date(s) Administered    COVID-19, PFIZER PURPLE top, DILUTE for use, (age 15 y+), 30mcg/0.3mL 2021, 2021, 10/26/2021       Active Problems:  Patient Active Problem List   Diagnosis Code    Rotator cuff tear M75.100    Acromioclavicular joint arthritis M19.019    Bicipital tenosynovitis M75.20    Pes anserine bursitis M70.50    Primary osteoarthritis of left knee M17.12    Acute medial meniscus tear of left knee S83.242A    Primary osteoarthritis of both knees M17.0    Chronic pain of right knee M25.561, G89.29    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, 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    Sepsis ([address] A41.9       Isolation/Infection:   Isolation            No Isolation Patient Infection Status       Infection Onset Added Last Indicated Last Indicated By Review Planned Expiration Resolved Resolved By    None active    Resolved    COVID-19 (Rule Out) 09/20/22 09/20/22 09/20/22 COVID-19 & Influenza Combo (Ordered)   09/20/22 Rule-Out Test Resulted    COVID-19 (Rule Out) 07/01/20 07/01/20 07/01/20 COVID-19 Ambulatory (Ordered)   07/04/20 Rule-Out Test Resulted            Nurse Assessment:  Last Vital Signs: /75   Pulse 89   Temp 97.6 °F (36.4 °C) (Oral)   Resp 18   Ht 5' 1\" (1.549 m)   Wt 156 lb 8.4 oz (71 kg)   SpO2 96%   BMI 29.58 kg/m²     Last documented pain score (0-10 scale): Pain Level: 0  Last Weight:   Wt Readings from Last 1 Encounters:   09/23/22 156 lb 8.4 oz (71 kg)     Mental Status:  oriented and alert    IV Access:  - None    Nursing Mobility/ADLs:  Walking   Independent  Transfer  Independent  Bathing  Independent  Dressing  Independent  Toileting  Independent  Feeding  [address]  Independent  Med Delivery   whole    Wound Care Documentation and Therapy:        Elimination:  Continence: Bowel: Yes  Bladder: Yes  Urinary Catheter: None   Colostomy/Ileostomy/Ileal Conduit: No       Date of Last BM:     Intake/Output Summary (Last 24 hours) at 9/24/2022 1227  Last data filed at 9/24/2022 0423  Gross per 24 hour   Intake 240 ml   Output 1300 ml   Net -1060 ml     I/O last 3 completed shifts:   In: 360 [P.O.:360]  Out: 2400 [Urine:2400]    Safety Concerns:     None    Impairments/Disabilities:      None    Nutrition Therapy:  Current Nutrition Therapy:   - Oral Diet:  General    Routes of Feeding: Oral  Liquids: No Restrictions  Daily Fluid Restriction: no  Last Modified Barium Swallow with Video (Video Swallowing Test): not done    Treatments at the Time of Hospital Discharge:   Respiratory Treatments:   Oxygen Therapy:    Ventilator:        Rehab Therapies: Physical Therapy and Occupational Therapy  Weight Bearing Status/Restrictions: No weight bearing restrictions  Other Medical Equipment (for information only, NOT a DME order):  walker  Other Treatments:     Patient's personal belongings (please select all that are sent with patient): All pt belongings were sent with patient at time of discharge. RN SIGNATURE:  Electronically signed by Eva Velasco RN on 9/24/22 at 3:29 PM EDT    CASE MANAGEMENT/SOCIAL WORK SECTION    Inpatient Status Date: ***    Readmission Risk Assessment Score:  Readmission Risk              Risk of Unplanned Readmission:  9           Discharging to Facility/ Agency   Name:   Address:  Phone:  Fax:    Dialysis Facility (if applicable)   Name:  Address:  Dialysis Schedule:  Phone:  Fax:    / signature: {Esignature:[id number]}    PHYSICIAN SECTION    Prognosis: Good    Condition at Discharge: Stable    Rehab Potential (if transferring to Rehab): Good    Recommended Labs or Other Treatments After Discharge: PT/OT    Physician Certification: I certify the above information and transfer of Rebeca Flood  is necessary for the continuing treatment of the diagnosis listed and that she requires Home Care for greater 30 days.      Update Admission H&P: No change in H&P    PHYSICIAN SIGNATURE:  Electronically signed by FLOR Worley CNP on 9/24/22 at 12:28 PM EDT

## 2022-09-25 LAB — BLOOD CULTURE, ROUTINE: NORMAL

## 2022-09-27 ENCOUNTER — TELEPHONE (OUTPATIENT)
Dept: ORTHOPEDIC SURGERY | Age: 82
End: 2022-09-27

## 2022-09-27 NOTE — TELEPHONE ENCOUNTER
General Question     Subject: REQ CALLBACK  Patient and /or Facility Request: Josefina Sarah \"[facility]\"  Contact Number:  [phone]    PT REQ CALLBACK AT NUMBER LISTED TO DISCUSS IF SHE IS STILL ELIGIBEL TO GET INJ IN HER KNEE. PT STATES SHE IS CURRENTLY IN HOSPITAL AND DOESN'T KNOW IF IT WILL AFFECT HER GETTING INJ IN KNEE. CONTACT PT TO ADVISE.

## 2022-09-27 NOTE — TELEPHONE ENCOUNTER
SPOKE TO PATIENT, REASSURED HER AUTH IS GOOD UNTIL October 19TH AND A FEW DAYS LATE FOR LAST INJECTION SHOULD NOT BE A MAJOR ISSUE. SCHEDULED HER FOR NEXT WEEK TO GIVE HER A FEW DAYS AT HOME TO CONTINUE TO REST/RE-COOP FROM BEING IN THE HOSPITAL.

## 2022-10-03 ENCOUNTER — OFFICE VISIT (OUTPATIENT)
Dept: ORTHOPEDIC SURGERY | Age: 82
End: 2022-10-03
Payer: MEDICARE

## 2022-10-03 DIAGNOSIS — M22.42 CHONDROMALACIA OF BOTH PATELLAE: ICD-10-CM

## 2022-10-03 DIAGNOSIS — M79.89 LEG SWELLING: ICD-10-CM

## 2022-10-03 DIAGNOSIS — M17.0 PRIMARY OSTEOARTHRITIS OF BOTH KNEES: Primary | ICD-10-CM

## 2022-10-03 DIAGNOSIS — M25.562 CHRONIC PAIN OF LEFT KNEE: ICD-10-CM

## 2022-10-03 DIAGNOSIS — G89.29 CHRONIC PAIN OF LEFT KNEE: ICD-10-CM

## 2022-10-03 DIAGNOSIS — M22.41 CHONDROMALACIA OF BOTH PATELLAE: ICD-10-CM

## 2022-10-03 DIAGNOSIS — G89.29 CHRONIC PAIN OF RIGHT KNEE: ICD-10-CM

## 2022-10-03 DIAGNOSIS — M25.561 CHRONIC PAIN OF RIGHT KNEE: ICD-10-CM

## 2022-10-03 PROCEDURE — MISC250 COMPRESSION STOCKING: Performed by: FAMILY MEDICINE

## 2022-10-03 PROCEDURE — 20610 DRAIN/INJ JOINT/BURSA W/O US: CPT | Performed by: FAMILY MEDICINE

## 2022-10-03 NOTE — PROGRESS NOTES
CC:  FU Knee Osteoarthritis with Viscosupplementation       HPI: History of Present Illness:    Jc Shay was last seen in the office on 7/12/2021 we completed viscosupplementation with Visco 3 to her knees. She did reasonably well become a little more sore lately. She is trying to perform her exercise program and does take Aleve very episodically and also utilize her Voltaren gel on an episodic basis. There is no recent injury to her knee but she did have an episode in roughly Thanksgiving 2021 which a frozen bottle of water fell onto the top of her freezer striking the distal aspect of her right foot. She did have immediate pain and did develop some occultly with walking and had only mild swelling but no ecchymosis. She does have underlying hammer toeing to toes 2 through 4 believing that she had she sustained a contusion put off initial evaluation but due to persistence of pain, she was seen in after-hours on 1/21/2002 and subsequently in the emergency room on 1/23/2022 for episodic foot pain. Her x-rays are negative for subacute fracture at that point and did show hammer toeing. She was encouraged to utilize her Voltaren gel and seek follow-up with her podiatrist Dr. Toro Nation and does have an appointment to see them on 1/28/2022. 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 She does not have supplemental orthotics. She does have episodic pain and while she does have some soreness over the heel she is also having some lateral ankle tenderness likely consistent with peroneal tendinitis. She is a type of rehabilitation. We last outlined in the office on 1/26/2022 and was started once again on Orthovisc to her knees bilaterally.  She presents back today stating that she is actually feeling better and has noticed a moderate improvement INSTRUCTIONS GIVEN TO PATIENT: The patient was advised to ice the knee for 15-20 minutes to relieve any injection site related pain. FOLLOW-UP: as directed. We will continue with her Voltaren gel although she occasionally does take an Aleve. She will work on her exercise program continue with her bracing and use her cane as necessary. She was recently hospitalized for left leg swelling ultimately diagnosed with pseudogout and was given compression stockings. She is aware that she can have repeat viscosupplementation 6-month intervals or interim steroid injections if necessary. She is going on vacation to [place] in just over a month. She will contact us in the interim with questions or concerns.

## 2022-10-10 LAB
BODY FLUID CULTURE, STERILE: NORMAL
GRAM STAIN RESULT: NORMAL

## 2023-04-27 ENCOUNTER — TELEPHONE (OUTPATIENT)
Dept: ORTHOPEDIC SURGERY | Age: 83
End: 2023-04-27

## 2023-04-27 DIAGNOSIS — G89.29 CHRONIC PAIN OF RIGHT KNEE: ICD-10-CM

## 2023-04-27 DIAGNOSIS — M22.42 CHONDROMALACIA OF BOTH PATELLAE: ICD-10-CM

## 2023-04-27 DIAGNOSIS — M17.0 PRIMARY OSTEOARTHRITIS OF BOTH KNEES: Primary | ICD-10-CM

## 2023-04-27 DIAGNOSIS — G89.29 CHRONIC PAIN OF LEFT KNEE: ICD-10-CM

## 2023-04-27 DIAGNOSIS — M25.562 CHRONIC PAIN OF LEFT KNEE: ICD-10-CM

## 2023-04-27 DIAGNOSIS — M22.41 CHONDROMALACIA OF BOTH PATELLAE: ICD-10-CM

## 2023-04-27 DIAGNOSIS — M25.561 CHRONIC PAIN OF RIGHT KNEE: ICD-10-CM

## 2023-04-27 NOTE — TELEPHONE ENCOUNTER
TOLD PATIENT WE WOULD NEED TO GET GEL INJECTIONS AUTHORIZED AHEAD OF TIME. PATIENT IS SCHEDULED FOR NEXT WEDNESDAY FOR AN APPOINTMENT - TOLD HER WE COULD DO A CORTISONE AT THAT APPT SINCE SHE IS HAVING A BIT OF A FLARE WITH HER ARTHRITIS. SHE WAS ON BOARD WITH PLAN. SHE HAS HAD GOOD RESPONSE TO INJECTIONS UNTIL ABOUT A WEEK AGO WHICH IS A LITTLE OVER 6 MONTHS.

## 2023-04-27 NOTE — TELEPHONE ENCOUNTER
Other PATIENT IS CALLING REQUESTING GEL INJECTIONS WOULD LIKE TO KNOW IF SHE NEEDS APPROVAL FROM INSURANCE.  Cory Betancur [phone]

## 2023-05-01 ENCOUNTER — TELEPHONE (OUTPATIENT)
Dept: ORTHOPEDIC SURGERY | Age: 83
End: 2023-05-01

## 2023-05-01 NOTE — TELEPHONE ENCOUNTER
General Question     Subject: INJECTION   Patient and /or Facility Request: Alida Padilla \"[facility]\"  Contact Number: [phone]    PATIENT CALLED IN TO SEE IF SHE CAN GET THE 3-SERIES INJECTIONS INSTEAD OF THE CORTISONE INJECTION. ..  FOR BOTH OF HER KNEES    PLEASE ADVISE

## 2023-05-01 NOTE — TELEPHONE ENCOUNTER
SPOKE TO PATIENT, TOLD HER THAT WAS FINE TO JUST SCHEDULE FOR GEL INJECTIONS INSTEAD OF CORTISONE. TOLD HER WE WOULD CALL ONCE WE GOT IT APPROVED.

## 2023-05-02 ENCOUNTER — TELEPHONE (OUTPATIENT)
Dept: ORTHOPEDIC SURGERY | Age: 83
End: 2023-05-02

## 2023-05-02 NOTE — TELEPHONE ENCOUNTER
Spoke to patient and let them know that orthovisc injections have been approved for their bilateral knees. Scheduled patient for those injections.

## 2023-05-03 ENCOUNTER — TELEPHONE (OUTPATIENT)
Dept: ORTHOPEDIC SURGERY | Age: 83
End: 2023-05-03

## 2023-05-03 ENCOUNTER — OFFICE VISIT (OUTPATIENT)
Dept: ORTHOPEDIC SURGERY | Age: 83
End: 2023-05-03

## 2023-05-03 VITALS — WEIGHT: 156 LBS | HEIGHT: 61 IN | BODY MASS INDEX: 29.45 KG/M2

## 2023-05-03 DIAGNOSIS — M25.561 CHRONIC PAIN OF RIGHT KNEE: ICD-10-CM

## 2023-05-03 DIAGNOSIS — G89.29 CHRONIC PAIN OF RIGHT KNEE: ICD-10-CM

## 2023-05-03 DIAGNOSIS — G89.29 CHRONIC PAIN OF LEFT KNEE: ICD-10-CM

## 2023-05-03 DIAGNOSIS — M25.562 CHRONIC PAIN OF LEFT KNEE: ICD-10-CM

## 2023-05-03 DIAGNOSIS — M22.42 CHONDROMALACIA OF BOTH PATELLAE: ICD-10-CM

## 2023-05-03 DIAGNOSIS — M22.41 CHONDROMALACIA OF BOTH PATELLAE: ICD-10-CM

## 2023-05-03 DIAGNOSIS — M17.0 PRIMARY OSTEOARTHRITIS OF BOTH KNEES: Primary | ICD-10-CM

## 2023-05-03 RX ORDER — BUPIVACAINE HYDROCHLORIDE 2.5 MG/ML
4 INJECTION, SOLUTION INFILTRATION; PERINEURAL ONCE
Status: COMPLETED | OUTPATIENT
Start: 2023-05-03 | End: 2023-05-03

## 2023-05-03 RX ORDER — LIDOCAINE HYDROCHLORIDE 10 MG/ML
2 INJECTION, SOLUTION INFILTRATION; PERINEURAL ONCE
Status: COMPLETED | OUTPATIENT
Start: 2023-05-03 | End: 2023-05-03

## 2023-05-03 RX ORDER — BETAMETHASONE SODIUM PHOSPHATE AND BETAMETHASONE ACETATE 3; 3 MG/ML; MG/ML
24 INJECTION, SUSPENSION INTRA-ARTICULAR; INTRALESIONAL; INTRAMUSCULAR; SOFT TISSUE ONCE
Status: COMPLETED | OUTPATIENT
Start: 2023-05-03 | End: 2023-05-03

## 2023-05-03 RX ADMIN — BETAMETHASONE SODIUM PHOSPHATE AND BETAMETHASONE ACETATE 24 MG: 3; 3 INJECTION, SUSPENSION INTRA-ARTICULAR; INTRALESIONAL; INTRAMUSCULAR; SOFT TISSUE at 11:28

## 2023-05-03 RX ADMIN — BUPIVACAINE HYDROCHLORIDE 10 MG: 2.5 INJECTION, SOLUTION INFILTRATION; PERINEURAL at 11:30

## 2023-05-03 RX ADMIN — LIDOCAINE HYDROCHLORIDE 2 ML: 10 INJECTION, SOLUTION INFILTRATION; PERINEURAL at 11:29

## 2023-05-03 NOTE — TELEPHONE ENCOUNTER
Other PATIENT WOULD LIKE A CALL BACK. SHE WANTS TO KNOW IF SHELL BE ABLE TO GET AN XRAY OF HER FOOT WHEN SHE COMES IN.  Cory 30 [phone]

## 2023-05-03 NOTE — TELEPHONE ENCOUNTER
SAW PATIENT IN OFFICE, THIS WAS HER KNEE, HAVING AN OA FLARE. STARTED WITH CORTISONE TODAY INSTEAD OF ORTHOVISC.

## 2023-05-10 ENCOUNTER — OFFICE VISIT (OUTPATIENT)
Dept: ORTHOPEDIC SURGERY | Age: 83
End: 2023-05-10

## 2023-05-10 DIAGNOSIS — M25.561 CHRONIC PAIN OF RIGHT KNEE: ICD-10-CM

## 2023-05-10 DIAGNOSIS — G89.29 CHRONIC PAIN OF LEFT KNEE: ICD-10-CM

## 2023-05-10 DIAGNOSIS — M22.41 CHONDROMALACIA OF BOTH PATELLAE: ICD-10-CM

## 2023-05-10 DIAGNOSIS — G89.29 CHRONIC PAIN OF RIGHT KNEE: ICD-10-CM

## 2023-05-10 DIAGNOSIS — M22.42 CHONDROMALACIA OF BOTH PATELLAE: ICD-10-CM

## 2023-05-10 DIAGNOSIS — M17.0 PRIMARY OSTEOARTHRITIS OF BOTH KNEES: Primary | ICD-10-CM

## 2023-05-10 DIAGNOSIS — M25.562 CHRONIC PAIN OF LEFT KNEE: ICD-10-CM

## 2023-05-10 NOTE — PROGRESS NOTES
Chief Complaint    Knee Pain (ORTHOVISC #1 GABY KNEES )      Evaluation recurrent worsening right knee pain with known history of left knee multicompartment osteoarthritis status post completion of bilateral knee Euflexxa 10/3/2022    History of Present Illness:  Lamar Babb is a [de-identified] y.o. female who is a very nice patient of Dr. Daron Power and longstanding patient of Dr. Alexandro Sahni is being seen today for evaluation of recurrent right greater than left knee pain. Once again she is known to have fairly substantial bilateral knee patellofemoral arthropathy with medial compartment osteoarthritis left greater than right but is more symptomatic on the right-hand side. She will last received her rounds of viscosupplementation her knees bilaterally on 3/9/2020 and was doing reasonably well up until early October 2020 when she began to notice a gradual onset of pain to the anterior medial portion of her knees. There is no history of actual injury or new activity prior to becoming symptomatic. She does complain of an achy pain which is fairly minor at rest at 1-2 out of 10 but when she is up and walking particular doing lots of stairs and distance walking or working out in the yard, her pain can be quite prominent at 5-6 out of 10. She feels as if her Euflexxa is beginning to wear out. She has been a little bit lax in performing her home-based exercise program and was able to wean off of her meloxicam when the Euflexxa was working properly for her. Denies true locking catching or instability symptoms. She is being seen today for orthopedic and sports consultation with updated imaging. We last saw Skylar Wilkes in the office on 2/9/2022 when we finished up her Orthovisc injections to her knees bilaterally for underlying significant bilateral knee osteoarthritis with patellofemoral arthropathy.   She is done very well and just in the last few days she has had some recurrent achiness to the anterior and medial portion of

## 2023-05-22 ENCOUNTER — OFFICE VISIT (OUTPATIENT)
Dept: ORTHOPEDIC SURGERY | Age: 83
End: 2023-05-22

## 2023-05-22 DIAGNOSIS — M17.0 PRIMARY OSTEOARTHRITIS OF BOTH KNEES: Primary | ICD-10-CM

## 2023-05-22 DIAGNOSIS — G89.29 CHRONIC PAIN OF LEFT KNEE: ICD-10-CM

## 2023-05-22 DIAGNOSIS — M25.561 CHRONIC PAIN OF RIGHT KNEE: ICD-10-CM

## 2023-05-22 DIAGNOSIS — M25.562 CHRONIC PAIN OF LEFT KNEE: ICD-10-CM

## 2023-05-22 DIAGNOSIS — M22.41 CHONDROMALACIA OF BOTH PATELLAE: ICD-10-CM

## 2023-05-22 DIAGNOSIS — M22.42 CHONDROMALACIA OF BOTH PATELLAE: ICD-10-CM

## 2023-05-22 DIAGNOSIS — G89.29 CHRONIC PAIN OF RIGHT KNEE: ICD-10-CM

## 2023-05-22 NOTE — PROGRESS NOTES
CC:  FU Knee Osteoarthritis with Viscosupplementation       HPI: History of Present Illness:    Rabia Woodward was last seen in the office on 7/12/2021 we completed viscosupplementation with Visco 3 to her knees. She did reasonably well become a little more sore lately. She is trying to perform her exercise program and does take Aleve very episodically and also utilize her Voltaren gel on an episodic basis. There is no recent injury to her knee but she did have an episode in roughly Thanksgiving 2021 which a frozen bottle of water fell onto the top of her freezer striking the distal aspect of her right foot. She did have immediate pain and did develop some occultly with walking and had only mild swelling but no ecchymosis. She does have underlying hammer toeing to toes 2 through 4 believing that she had she sustained a contusion put off initial evaluation but due to persistence of pain, she was seen in after-hours on 1/21/2002 and subsequently in the emergency room on 1/23/2022 for episodic foot pain. Her x-rays are negative for subacute fracture at that point and did show hammer toeing. She was encouraged to utilize her Voltaren gel and seek follow-up with her podiatrist Dr. Fabiana Ferrell and does have an appointment to see them on 1/28/2022. 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 She does not have supplemental orthotics. She does have episodic pain and while she does have some soreness over the heel she is also having some lateral ankle tenderness likely consistent with peroneal tendinitis. She is a type of rehabilitation. We last outlined in the office on 1/26/2022 and was started once again on Orthovisc to her knees bilaterally.  She presents back today stating that she is actually feeling better and has noticed a moderate improvement

## 2023-05-31 ENCOUNTER — OFFICE VISIT (OUTPATIENT)
Dept: ORTHOPEDIC SURGERY | Age: 83
End: 2023-05-31

## 2023-05-31 DIAGNOSIS — G89.29 CHRONIC PAIN OF RIGHT KNEE: ICD-10-CM

## 2023-05-31 DIAGNOSIS — M22.41 CHONDROMALACIA OF BOTH PATELLAE: ICD-10-CM

## 2023-05-31 DIAGNOSIS — M22.42 CHONDROMALACIA OF BOTH PATELLAE: ICD-10-CM

## 2023-05-31 DIAGNOSIS — M25.562 CHRONIC PAIN OF LEFT KNEE: ICD-10-CM

## 2023-05-31 DIAGNOSIS — M25.561 CHRONIC PAIN OF RIGHT KNEE: ICD-10-CM

## 2023-05-31 DIAGNOSIS — G89.29 CHRONIC PAIN OF LEFT KNEE: ICD-10-CM

## 2023-05-31 DIAGNOSIS — M17.0 PRIMARY OSTEOARTHRITIS OF BOTH KNEES: Primary | ICD-10-CM

## 2023-05-31 NOTE — PROGRESS NOTES
CC:  FU Knee Osteoarthritis with Viscosupplementation       HPI: History of Present Illness:    Ludwig Sosa was last seen in the office on 7/12/2021 we completed viscosupplementation with Visco 3 to her knees. She did reasonably well become a little more sore lately. She is trying to perform her exercise program and does take Aleve very episodically and also utilize her Voltaren gel on an episodic basis. There is no recent injury to her knee but she did have an episode in roughly Thanksgiving 2021 which a frozen bottle of water fell onto the top of her freezer striking the distal aspect of her right foot. She did have immediate pain and did develop some occultly with walking and had only mild swelling but no ecchymosis. She does have underlying hammer toeing to toes 2 through 4 believing that she had she sustained a contusion put off initial evaluation but due to persistence of pain, she was seen in after-hours on 1/21/2002 and subsequently in the emergency room on 1/23/2022 for episodic foot pain. Her x-rays are negative for subacute fracture at that point and did show hammer toeing. She was encouraged to utilize her Voltaren gel and seek follow-up with her podiatrist Dr. Makeda Valle and does have an appointment to see them on 1/28/2022. 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 She does not have supplemental orthotics. She does have episodic pain and while she does have some soreness over the heel she is also having some lateral ankle tenderness likely consistent with peroneal tendinitis. She is a type of rehabilitation. We last outlined in the office on 1/26/2022 and was started once again on Orthovisc to her knees bilaterally.  She presents back today stating that she is actually feeling better and has noticed a moderate improvement

## 2023-11-01 ENCOUNTER — TELEPHONE (OUTPATIENT)
Dept: ORTHOPEDIC SURGERY | Age: 83
End: 2023-11-01

## 2023-11-01 DIAGNOSIS — G89.29 CHRONIC PAIN OF RIGHT KNEE: ICD-10-CM

## 2023-11-01 DIAGNOSIS — M17.0 PRIMARY OSTEOARTHRITIS OF BOTH KNEES: Primary | ICD-10-CM

## 2023-11-01 DIAGNOSIS — M25.562 CHRONIC PAIN OF LEFT KNEE: ICD-10-CM

## 2023-11-01 DIAGNOSIS — M25.561 CHRONIC PAIN OF RIGHT KNEE: ICD-10-CM

## 2023-11-01 DIAGNOSIS — M22.42 CHONDROMALACIA OF BOTH PATELLAE: ICD-10-CM

## 2023-11-01 DIAGNOSIS — M22.41 CHONDROMALACIA OF BOTH PATELLAE: ICD-10-CM

## 2023-11-01 DIAGNOSIS — G89.29 CHRONIC PAIN OF LEFT KNEE: ICD-10-CM

## 2023-11-13 ENCOUNTER — TELEPHONE (OUTPATIENT)
Dept: ORTHOPEDIC SURGERY | Age: 83
End: 2023-11-13

## 2023-11-13 DIAGNOSIS — M25.561 CHRONIC PAIN OF RIGHT KNEE: ICD-10-CM

## 2023-11-13 DIAGNOSIS — M22.42 CHONDROMALACIA OF BOTH PATELLAE: ICD-10-CM

## 2023-11-13 DIAGNOSIS — G89.29 CHRONIC PAIN OF RIGHT KNEE: ICD-10-CM

## 2023-11-13 DIAGNOSIS — M22.41 CHONDROMALACIA OF BOTH PATELLAE: ICD-10-CM

## 2023-11-13 DIAGNOSIS — M25.562 CHRONIC PAIN OF LEFT KNEE: ICD-10-CM

## 2023-11-13 DIAGNOSIS — M17.0 PRIMARY OSTEOARTHRITIS OF BOTH KNEES: Primary | ICD-10-CM

## 2023-11-13 DIAGNOSIS — G89.29 CHRONIC PAIN OF LEFT KNEE: ICD-10-CM

## 2023-11-13 NOTE — TELEPHONE ENCOUNTER
LVM for patient that [address] injections have been approved for BILATERAL knee. Told patient they could call central scheduling at [phone] at their convenience to schedule those appointments.  Also told them if they had any problems or questions, they could call me directly at [phone]    ELIGIBLE 12/1/23 OR AFTER

## 2023-12-11 ENCOUNTER — OFFICE VISIT (OUTPATIENT)
Dept: ORTHOPEDIC SURGERY | Age: 83
End: 2023-12-11
Payer: MEDICARE

## 2023-12-11 DIAGNOSIS — G89.29 CHRONIC PAIN OF RIGHT KNEE: ICD-10-CM

## 2023-12-11 DIAGNOSIS — M22.41 CHONDROMALACIA OF BOTH PATELLAE: ICD-10-CM

## 2023-12-11 DIAGNOSIS — M22.42 CHONDROMALACIA OF BOTH PATELLAE: ICD-10-CM

## 2023-12-11 DIAGNOSIS — M17.0 PRIMARY OSTEOARTHRITIS OF BOTH KNEES: Primary | ICD-10-CM

## 2023-12-11 DIAGNOSIS — M25.562 CHRONIC PAIN OF LEFT KNEE: ICD-10-CM

## 2023-12-11 DIAGNOSIS — G89.29 CHRONIC PAIN OF LEFT KNEE: ICD-10-CM

## 2023-12-11 DIAGNOSIS — M25.561 CHRONIC PAIN OF RIGHT KNEE: ICD-10-CM

## 2023-12-11 PROCEDURE — 1123F ACP DISCUSS/DSCN MKR DOCD: CPT | Performed by: FAMILY MEDICINE

## 2023-12-11 PROCEDURE — 20610 DRAIN/INJ JOINT/BURSA W/O US: CPT | Performed by: FAMILY MEDICINE

## 2023-12-11 PROCEDURE — 99213 OFFICE O/P EST LOW 20 MIN: CPT | Performed by: FAMILY MEDICINE

## 2023-12-11 NOTE — PROGRESS NOTES
Chief Complaint    Knee Pain (ORTHOVISC #1 GABY KNEES )      Evaluation recurrent worsening right knee pain with known history of left knee multicompartment osteoarthritis status post completion of bilateral knee Euflexxa 5/31/2023    History of Present Illness:  Kyler Gregg is a 82 y.o. female who is a very nice patient of Dr. Consuelo England and longstanding patient of Dr. Chloe Wilkes is being seen today for evaluation of recurrent right greater than left knee pain. Once again she is known to have fairly substantial bilateral knee patellofemoral arthropathy with medial compartment osteoarthritis left greater than right but is more symptomatic on the right-hand side. She will last received her rounds of viscosupplementation her knees bilaterally on 3/9/2020 and was doing reasonably well up until early October 2020 when she began to notice a gradual onset of pain to the anterior medial portion of her knees. There is no history of actual injury or new activity prior to becoming symptomatic. She does complain of an achy pain which is fairly minor at rest at 1-2 out of 10 but when she is up and walking particular doing lots of stairs and distance walking or working out in the yard, her pain can be quite prominent at 5-6 out of 10. She feels as if her Euflexxa is beginning to wear out. She has been a little bit lax in performing her home-based exercise program and was able to wean off of her meloxicam when the Euflexxa was working properly for her. Denies true locking catching or instability symptoms. She is being seen today for orthopedic and sports consultation with updated imaging. We last saw Guillermina Hansen in the office on 2/9/2022 when we finished up her Orthovisc injections to her knees bilaterally for underlying significant bilateral knee osteoarthritis with patellofemoral arthropathy.   She is done very well and just in the last few days she has had some recurrent achiness to the anterior and medial portion of

## 2023-12-18 PROBLEM — M25.562 CHRONIC PAIN OF LEFT KNEE: Status: ACTIVE | Noted: 2023-12-18

## 2023-12-18 PROBLEM — G89.29 CHRONIC PAIN OF LEFT KNEE: Status: ACTIVE | Noted: 2023-12-18

## 2023-12-25 ENCOUNTER — APPOINTMENT (OUTPATIENT)
Dept: GENERAL RADIOLOGY | Age: 83
End: 2023-12-25
Payer: MEDICARE

## 2023-12-25 ENCOUNTER — HOSPITAL ENCOUNTER (EMERGENCY)
Age: 83
Discharge: HOME | End: 2023-12-25
Payer: MEDICARE

## 2023-12-25 VITALS
HEART RATE: 96 BPM | DIASTOLIC BLOOD PRESSURE: 73 MMHG | RESPIRATION RATE: 18 BRPM | OXYGEN SATURATION: 94 % | SYSTOLIC BLOOD PRESSURE: 137 MMHG | TEMPERATURE: 98 F
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
## 2023-12-25 DIAGNOSIS — S93.402A SPRAIN OF LEFT ANKLE, UNSPECIFIED LIGAMENT, INITIAL ENCOUNTER: Primary | ICD-10-CM

## 2023-12-25 DIAGNOSIS — M25.472 PAIN AND SWELLING OF LEFT ANKLE: ICD-10-CM

## 2023-12-25 DIAGNOSIS — M25.572 PAIN AND SWELLING OF LEFT ANKLE: ICD-10-CM

## 2023-12-25 PROCEDURE — 73610 X-RAY EXAM OF ANKLE: CPT

## 2023-12-25 PROCEDURE — 99283 EMERGENCY DEPT VISIT LOW MDM: CPT

## 2023-12-25 PROCEDURE — 73630 X-RAY EXAM OF FOOT: CPT

## 2023-12-25 NOTE — ED PROVIDER NOTES
Galloolegario [phone]    Call in 2 days  For further evaluation    [facility]  ED  [address]  Go to   If symptoms worsen      DISCHARGE MEDICATIONS:  New Prescriptions    No medications on file       DISCONTINUED MEDICATIONS:  Discontinued Medications    No medications on file              (Please note that portions of this note were completed with a voice recognition program.  Efforts were made to edit the dictations but occasionally words are mis-transcribed.)    FLOR Lim CNP (electronically signed)        FLOR Lim CNP  12/25/23 8148

## 2024-01-02 ENCOUNTER — NURSE ONLY (OUTPATIENT)
Dept: ORTHOPEDIC SURGERY | Age: 84
End: 2024-01-02
Payer: MEDICARE

## 2024-01-02 VITALS — BODY MASS INDEX: 29.45 KG/M2 | HEIGHT: 61 IN | WEIGHT: 156 LBS

## 2024-01-02 DIAGNOSIS — M25.572 ACUTE LEFT ANKLE PAIN: ICD-10-CM

## 2024-01-02 DIAGNOSIS — M17.0 PRIMARY OSTEOARTHRITIS OF BOTH KNEES: Primary | ICD-10-CM

## 2024-01-02 DIAGNOSIS — S82.62XA CLOSED FRACTURE OF DISTAL LATERAL MALLEOLUS OF LEFT FIBULA, INITIAL ENCOUNTER: ICD-10-CM

## 2024-01-02 PROCEDURE — 27786 TREATMENT OF ANKLE FRACTURE: CPT | Performed by: PHYSICIAN ASSISTANT

## 2024-01-02 PROCEDURE — 20610 DRAIN/INJ JOINT/BURSA W/O US: CPT | Performed by: PHYSICIAN ASSISTANT

## 2024-01-02 PROCEDURE — L4361 PNEUMA/VAC WALK BOOT PRE OTS: HCPCS | Performed by: PHYSICIAN ASSISTANT

## 2024-01-02 PROCEDURE — 99214 OFFICE O/P EST MOD 30 MIN: CPT | Performed by: PHYSICIAN ASSISTANT

## 2024-01-02 PROCEDURE — 1123F ACP DISCUSS/DSCN MKR DOCD: CPT | Performed by: PHYSICIAN ASSISTANT

## 2024-01-02 RX ORDER — HYALURONATE SODIUM 10 MG/ML
20 SYRINGE (ML) INTRAARTICULAR ONCE
Status: COMPLETED | OUTPATIENT
Start: 2024-01-02 | End: 2024-01-02

## 2024-01-02 RX ADMIN — Medication 20 MG: at 09:04

## 2024-01-02 NOTE — PROGRESS NOTES
and 2+.  Neurological: The patient has good coordination.  There is no weakness or sensory deficit.    Let ankle Examination:    Inspection:  Swelling and ecchymosis surrounding the left ankle    Palpation:  Diffuse tenderness to palpation but most pronounced over the ATFL and distal fibula region.    Range of Motion:  Limited range of motion due to pain and swelling    Strength:  Intact but limited due to pain and swelling    Special Tests:  Positive anterior drawer.  Positive talar tilt.    Skin: There are no rashes, ulcerations or lesions.    Gait: Altered    Reflex 2+ and symmetric    Additional Comments:       Additional Examinations:         Right Lower Extremity: Examination of the right lower extremity does not show any tenderness, deformity or injury.  Range of motion is unremarkable.  There is no gross instability.  There are no rashes, ulcerations or lesions.  Strength and tone are normal.     Radiology:     X-rays obtained and reviewed in office:  Views 3 views including AP, lateral, and oblique  Location left ankle  Impression: Nondisplaced lateral malleolus fracture.  There is soft tissue swelling noted.  Ankle mortise is congruent well-maintained        Impression:  Encounter Diagnoses   Name Primary?    Primary osteoarthritis of both knees Yes    Acute left ankle pain     Closed fracture of distal lateral malleolus of left fibula, initial encounter        Office Procedures:  Orders Placed This Encounter   Procedures    US ARTHR/ASP/INJ MAJOR JNT/BURSA RIGHT     Standing Status:   Future     Number of Occurrences:   1     Standing Expiration Date:   12/26/2024    XR ANKLE LEFT (MIN 3 VIEWS)     Standing Status:   Future     Number of Occurrences:   1     Standing Expiration Date:   1/2/2025 20610 - NC DRAIN/INJECT LARGE JOINT/BURSA       Treatment Plan:      In terms of patient's bilateral knee osteoarthritis we will perform her final viscosupplementation injection.    In terms of her left ankle

## 2024-01-03 ENCOUNTER — TELEPHONE (OUTPATIENT)
Dept: ORTHOPEDIC SURGERY | Age: 84
End: 2024-01-03

## 2024-01-08 ENCOUNTER — TELEPHONE (OUTPATIENT)
Dept: ORTHOPEDIC SURGERY | Age: 84
End: 2024-01-08

## 2024-01-08 NOTE — TELEPHONE ENCOUNTER
TALKED TO PATIENT ABOUT GETTING A WALKER TO TAKE PRESSURE OF HER KNEE, ALSO TALKED ABOUT HER GETTING AN EVEN-UP FOR HER RIGHT FOOT. TALKED ABOUT ICING KNEE AND ANKLE. PATIENT VOICED UNDERSTANDING AND WILL LOOK ON [employer] TO ORDER AN EVEN-UP.

## 2024-01-08 NOTE — TELEPHONE ENCOUNTER
General Question     Subject: LT KNEE  Patient and /or Facility Request: Janae Casillas   Contact Number: [phone]     PATIENT CALLING REGARDING SHE HAD A 3RD EUFLEXXA  INJECTION ON JANUARY 2, 2024.    PATIENT HAS A BOOT ON FOR A BROKEN ANKLE, BUT THIS MORNING SHE WOKE UP AND CAN HARDLY WALK. SHE'S HAVING EXTREME PAIN BEHIND HER LEFT KNEE. THIS IS NOT AFFECTING HER LT ANKLE.    PATIENT WAS TREATED BY [address] AND SHE WOULD LIKE TO SPEAK TO HIM OR SOMEONE REGARDING THE PAIN AND WHAT SHE SHOULD DO.    PLEASE CALL THE PATIENT BACK AT THE ABOVE NUMBER.

## 2024-01-09 ENCOUNTER — TELEPHONE (OUTPATIENT)
Dept: ORTHOPEDIC SURGERY | Age: 84
End: 2024-01-09

## 2024-01-09 ENCOUNTER — OFFICE VISIT (OUTPATIENT)
Dept: ORTHOPEDIC SURGERY | Age: 84
End: 2024-01-09
Payer: MEDICARE

## 2024-01-09 VITALS — BODY MASS INDEX: 29.45 KG/M2 | WEIGHT: 156 LBS | HEIGHT: 61 IN

## 2024-01-09 DIAGNOSIS — M25.572 ACUTE LEFT ANKLE PAIN: Primary | ICD-10-CM

## 2024-01-09 DIAGNOSIS — M17.12 PRIMARY OSTEOARTHRITIS OF LEFT KNEE: ICD-10-CM

## 2024-01-09 DIAGNOSIS — S82.62XA CLOSED FRACTURE OF DISTAL LATERAL MALLEOLUS OF LEFT FIBULA, INITIAL ENCOUNTER: ICD-10-CM

## 2024-01-09 PROCEDURE — 99213 OFFICE O/P EST LOW 20 MIN: CPT | Performed by: PHYSICIAN ASSISTANT

## 2024-01-09 PROCEDURE — 20611 DRAIN/INJ JOINT/BURSA W/US: CPT | Performed by: PHYSICIAN ASSISTANT

## 2024-01-09 PROCEDURE — 1123F ACP DISCUSS/DSCN MKR DOCD: CPT | Performed by: PHYSICIAN ASSISTANT

## 2024-01-09 RX ORDER — BUPIVACAINE HYDROCHLORIDE 2.5 MG/ML
30 INJECTION, SOLUTION INFILTRATION; PERINEURAL ONCE
Status: COMPLETED | OUTPATIENT
Start: 2024-01-09 | End: 2024-01-09

## 2024-01-09 RX ORDER — TRIAMCINOLONE ACETONIDE 40 MG/ML
40 INJECTION, SUSPENSION INTRA-ARTICULAR; INTRAMUSCULAR ONCE
Status: COMPLETED | OUTPATIENT
Start: 2024-01-09 | End: 2024-01-09

## 2024-01-09 RX ORDER — LIDOCAINE HYDROCHLORIDE 10 MG/ML
5 INJECTION, SOLUTION INFILTRATION; PERINEURAL ONCE
Status: COMPLETED | OUTPATIENT
Start: 2024-01-09 | End: 2024-01-09

## 2024-01-09 RX ADMIN — BUPIVACAINE HYDROCHLORIDE 75 MG: 2.5 INJECTION, SOLUTION INFILTRATION; PERINEURAL at 16:09

## 2024-01-09 RX ADMIN — TRIAMCINOLONE ACETONIDE 40 MG: 40 INJECTION, SUSPENSION INTRA-ARTICULAR; INTRAMUSCULAR at 16:10

## 2024-01-09 RX ADMIN — LIDOCAINE HYDROCHLORIDE 5 ML: 10 INJECTION, SOLUTION INFILTRATION; PERINEURAL at 16:09

## 2024-01-09 NOTE — TELEPHONE ENCOUNTER
Appointment Request     Patient requesting earlier appointment: Yes  Appointment offered to patient: YES  Patient Contact Number: [phone]    KLAUS LOOKING FOR AN EARLIER TIME 1-9-4

## 2024-01-09 NOTE — PROGRESS NOTES
Chief Complaint    Follow-up (Ck Left Ankle)      History of Present Illness:  Janae Casillas is a 83 y.o. female who presents to the office today for a follow-up visit.  Patient being treated for left ankle lateral malleolus fracture.  She is currently in a tall boot weightbearing as tolerated.  The ankle is feeling better with less pain.    Patient has also been treated for left knee osteoarthritis.  The boot has aggravated her osteoarthritis and she denies a significant amount of pain and swelling involving her left knee    Previous history: Patient who presents to the office today for new problem.  Patient here with a chief complaint of left lateral ankle pain.  Patient twisted her ankle over the weekend.  She jumped up after the neighbor knocked on her door and she rolled her ankle in an inversion manner.  She denies any medial ankle pain.  No proximal lower leg pain.  .    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     Patient Active Problem List    Diagnosis Date Noted    Sepsis (HCC) 09/20/2022    Chronic pain of left knee 12/18/2023    Chondromalacia of both patellae 08/21/2019    Primary osteoarthritis of both knees 04/22/2019    Chronic pain of right knee 04/22/2019    Acute medial meniscus tear of left knee 01/07/2019    Primary osteoarthritis of left knee 07/29/2017    Pes anserine bursitis 11/16/2016    Bicipital tenosynovitis 05/12/2014    Rotator cuff tear 04/28/2014    Acromioclavicular joint arthritis 04/28/2014     Past Surgical History:   Procedure Laterality Date    APPENDECTOMY      APPENDECTOMY      CARDIAC CATHETERIZATION  2004

## 2024-02-06 ENCOUNTER — OFFICE VISIT (OUTPATIENT)
Dept: ORTHOPEDIC SURGERY | Age: 84
End: 2024-02-06
Payer: MEDICARE

## 2024-02-06 VITALS — HEIGHT: 61 IN | WEIGHT: 156 LBS | BODY MASS INDEX: 29.45 KG/M2

## 2024-02-06 DIAGNOSIS — S82.62XA CLOSED FRACTURE OF DISTAL LATERAL MALLEOLUS OF LEFT FIBULA, INITIAL ENCOUNTER: Primary | ICD-10-CM

## 2024-02-06 PROCEDURE — 99213 OFFICE O/P EST LOW 20 MIN: CPT | Performed by: PHYSICIAN ASSISTANT

## 2024-02-06 PROCEDURE — 1123F ACP DISCUSS/DSCN MKR DOCD: CPT | Performed by: PHYSICIAN ASSISTANT

## 2024-02-06 NOTE — PROGRESS NOTES
Chief Complaint    Follow-up (Ck Left Ankle - Lateral Malleolus )      History of Present Illness:  Janae Casillas is a 83 y.o. female patient has been in a tall boot for 4 weeks.  She is closer to 7 weeks from her date of injury.  She is doing better with less pain.      Previous History: Patient who presents to the office today for a follow-up visit.  Patient being treated for left ankle lateral malleolus fracture.  She is currently in a tall boot weightbearing as tolerated.  The ankle is feeling better with less pain.    Previous history: Patient who presents to the office today for new problem.  Patient here with a chief complaint of left lateral ankle pain.  Patient twisted her ankle over the weekend.  She jumped up after the neighbor knocked on her door and she rolled her ankle in an inversion manner.  She denies any medial ankle pain.  No proximal lower leg pain.  .    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     Patient Active Problem List    Diagnosis Date Noted    Sepsis (HCC) 09/20/2022    Chronic pain of left knee 12/18/2023    Chondromalacia of both patellae 08/21/2019    Primary osteoarthritis of both knees 04/22/2019    Chronic pain of right knee 04/22/2019    Acute medial meniscus tear of left knee 01/07/2019    Primary osteoarthritis of left knee 07/29/2017    Pes anserine bursitis 11/16/2016    Bicipital tenosynovitis 05/12/2014    Rotator cuff tear 04/28/2014    Acromioclavicular joint arthritis 04/28/2014     Past Surgical History:   Procedure Laterality Date    APPENDECTOMY      APPENDECTOMY      CARDIAC CATHETERIZATION  2004

## 2024-03-05 ENCOUNTER — OFFICE VISIT (OUTPATIENT)
Dept: ORTHOPEDIC SURGERY | Age: 84
End: 2024-03-05
Payer: MEDICARE

## 2024-03-05 ENCOUNTER — HOSPITAL ENCOUNTER (OUTPATIENT)
Dept: PHYSICAL THERAPY | Age: 84
Setting detail: THERAPIES SERIES
Discharge: HOME | End: 2024-03-05
Payer: MEDICARE

## 2024-03-05 VITALS — BODY MASS INDEX: 29.45 KG/M2 | WEIGHT: 156 LBS | HEIGHT: 61 IN

## 2024-03-05 DIAGNOSIS — S82.62XA CLOSED FRACTURE OF DISTAL LATERAL MALLEOLUS OF LEFT FIBULA, INITIAL ENCOUNTER: Primary | ICD-10-CM

## 2024-03-05 PROCEDURE — 97161 PT EVAL LOW COMPLEX 20 MIN: CPT | Performed by: PHYSICAL THERAPIST

## 2024-03-05 PROCEDURE — 97530 THERAPEUTIC ACTIVITIES: CPT | Performed by: PHYSICAL THERAPIST

## 2024-03-05 PROCEDURE — 99213 OFFICE O/P EST LOW 20 MIN: CPT | Performed by: PHYSICIAN ASSISTANT

## 2024-03-05 PROCEDURE — 97110 THERAPEUTIC EXERCISES: CPT | Performed by: PHYSICAL THERAPIST

## 2024-03-05 PROCEDURE — 1123F ACP DISCUSS/DSCN MKR DOCD: CPT | Performed by: PHYSICIAN ASSISTANT

## 2024-03-05 NOTE — PROGRESS NOTES
at bedtime Clarify dose       No current facility-administered medications for this visit.           Vital Signs:  Ht 1.549 m (5' 1\")   Wt 70.8 kg (156 lb)   BMI 29.48 kg/m²     General Exam:   Constitutional: Patient is adequately groomed with no evidence of malnutrition  DTRs: Deep tendon reflexes are intact  Mental Status: The patient is oriented to time, place and person.  The patient's mood and affect are appropriate.  Lymphatic: The lymphatic examination bilaterally reveals all areas to be without enlargement or induration.  Vascular: Examination reveals no swelling or calf tenderness.  Peripheral pulses are palpable and 2+.  Neurological: The patient has good coordination.  There is no weakness or sensory deficit.    Let ankle Examination:    Inspection: Minimal swelling and ecchymosis surrounding the left ankle    Palpation: Minimal diffuse tenderness to palpation but most pronounced over the ATFL and distal fibula region.    Range of Motion:  Limited range of motion due to pain and swelling    Strength:  Intact but limited due to pain and swelling    Special Tests:  Positive anterior drawer.  Positive talar tilt.    Skin: There are no rashes, ulcerations or lesions.    Gait: Altered    Reflex 2+ and symmetric      Additional Examinations:         Right Lower Extremity: Examination of the right lower extremity does not show any tenderness, deformity or injury.  Range of motion is unremarkable.  There is no gross instability.  There are no rashes, ulcerations or lesions.  Strength and tone are normal.     Radiology:     X-rays obtained and reviewed in office:  Views 3 views including AP, lateral, and oblique  Location left ankle  Impression: Nondisplaced lateral malleolus fracture.  There is soft tissue swelling noted.  Ankle mortise is congruent well-maintained        Impression:  Encounter Diagnosis   Name Primary?    Closed fracture of distal lateral malleolus of left fibula, initial encounter Yes

## 2024-03-18 ENCOUNTER — HOSPITAL ENCOUNTER (OUTPATIENT)
Dept: PHYSICAL THERAPY | Age: 84
Setting detail: THERAPIES SERIES
Discharge: HOME | End: 2024-03-18
Payer: MEDICARE

## 2024-03-18 PROCEDURE — 97112 NEUROMUSCULAR REEDUCATION: CPT | Performed by: PHYSICAL THERAPIST

## 2024-03-18 PROCEDURE — 97110 THERAPEUTIC EXERCISES: CPT | Performed by: PHYSICAL THERAPIST

## 2024-03-18 PROCEDURE — 97140 MANUAL THERAPY 1/> REGIONS: CPT | Performed by: PHYSICAL THERAPIST

## 2024-03-18 NOTE — FLOWSHEET NOTE
[facility]- Outpatient Rehabilitation and Therapy  [address], [place] [address] office: [phone] fax: [phone]           Physical Therapy: TREATMENT/PROGRESS NOTE   Patient: Janae Casillas (83 y.o. female)   Examination Date: 2024   :  1940 MRN: [id number]   Visit #: Visit count could not be calculated. Make sure you are using a visit which is associated with an episode.   Insurance Allowable Auth Needed   25 []Yes    []No    Insurance: Payor: AET[employer] MEDICARE / Plan: AETNA MEDICARE-ADVANTAGE PPO / Product Type: Medicare /   Insurance ID: [id number] - (Medicare Managed)  Secondary Insurance (if applicable):    Treatment Diagnosis: L ankle stiffness, gt difficulties   Medical Diagnosis:  Closed fracture of distal lateral malleolus of left fibula, initial encounter [S82.62XA]   Referring Physician: Gualberto Brewster PA-C  PCP: Taco Caruso MD       Plan of care signed (Y/N): sent 3/5/24    Date of Patient follow up with Physician:      Progress Report/POC: NO  POC update due: (10 visits /OR AUTH LIMITS, whichever is less)  2024                                             Precautions/ Contra-indications:           Latex allergy:  YES  Pacemaker:    NO  Contraindications for Manipulation: None and recent fracture  Date of Surgery: N/A;  Other:    Red Flags:  None    C-SSRS Triggered by Intake questionnaire:   [x] No, Questionnaire did not trigger screening.   [] Yes, Patient intake triggered further evaluation      [] C-SSRS Screening completed  [] PCP notified via Plan of Care  [] Emergency services notified     Preferred Language for Healthcare:   [x] English       [] other:    SUBJECTIVE EXAMINATION     Patient stated complaint: Struggling with balance at home.  Worse in the mornings but feels better with movement and once day progresses.  Overall compliant with HEP.  No use / need for ice or medication.  Feels improvement.      EVAL: Pt was asleep in chair

## 2024-03-27 ENCOUNTER — APPOINTMENT (OUTPATIENT)
Dept: PHYSICAL THERAPY | Age: 84
End: 2024-03-27
Payer: MEDICARE

## 2024-03-29 ENCOUNTER — HOSPITAL ENCOUNTER (OUTPATIENT)
Dept: PHYSICAL THERAPY | Age: 84
Setting detail: THERAPIES SERIES
Discharge: HOME | End: 2024-03-29
Payer: MEDICARE

## 2024-03-29 PROCEDURE — 97110 THERAPEUTIC EXERCISES: CPT | Performed by: PHYSICAL THERAPIST

## 2024-03-29 PROCEDURE — 97140 MANUAL THERAPY 1/> REGIONS: CPT | Performed by: PHYSICAL THERAPIST

## 2024-03-29 NOTE — FLOWSHEET NOTE
[facility]- Outpatient Rehabilitation and Therapy  [address], [place] [address] office: [phone] fax: [phone]           Physical Therapy: TREATMENT/PROGRESS NOTE   Patient: Janae Casillas (83 y.o. female)   Examination Date: 2024   :  1940 MRN: [id number]   Visit #: 3  Insurance Allowable Auth Needed   25 []Yes    []No    Insurance: Payor: AETNA MEDICARE / Plan: AETNA MEDICARE-ADVANTAGE PPO / Product Type: Medicare /   Insurance ID: [id number] - (Medicare Managed)  Secondary Insurance (if applicable):    Treatment Diagnosis: L ankle stiffness, gt difficulties   Medical Diagnosis:  Closed fracture of distal lateral malleolus of left fibula, initial encounter [S82.62XA]   Referring Physician: Gualberto Brewster PA-C  PCP: Taco Caruso MD       Plan of care signed (Y/N): sent 3/5/24    Date of Patient follow up with Physician:      Progress Report/POC: NO  POC update due: (10 visits /OR AUTH LIMITS, whichever is less)  2024                                             Precautions/ Contra-indications:           Latex allergy:  YES  Pacemaker:    NO  Contraindications for Manipulation: None and recent fracture  Date of Surgery: N/A;  Other:    Red Flags:  None    C-SSRS Triggered by Intake questionnaire:   [x] No, Questionnaire did not trigger screening.   [] Yes, Patient intake triggered further evaluation      [] C-SSRS Screening completed  [] PCP notified via Plan of Care  [] Emergency services notified     Preferred Language for Healthcare:   [x] English       [] other:    SUBJECTIVE EXAMINATION     Patient stated complaint: Walking 1/2 hour without any ankle pain.  Still feels decreased energy.  Stiffness in the mornings but improves as day goes.  Feels back to PLOF.  Working on integrating exercises into her life.  Been busy this week.      EVAL: Pt was asleep in chair and was startled awake, falling over blanket. She was misdiagnosed initially as sprain

## 2024-04-08 ENCOUNTER — OFFICE VISIT (OUTPATIENT)
Dept: ORTHOPEDIC SURGERY | Age: 84
End: 2024-04-08
Payer: MEDICARE

## 2024-04-08 VITALS — HEIGHT: 61 IN | WEIGHT: 156 LBS | BODY MASS INDEX: 29.45 KG/M2

## 2024-04-08 DIAGNOSIS — S82.62XA CLOSED FRACTURE OF DISTAL LATERAL MALLEOLUS OF LEFT FIBULA, INITIAL ENCOUNTER: Primary | ICD-10-CM

## 2024-04-08 PROCEDURE — 99213 OFFICE O/P EST LOW 20 MIN: CPT | Performed by: PHYSICIAN ASSISTANT

## 2024-04-08 PROCEDURE — 1123F ACP DISCUSS/DSCN MKR DOCD: CPT | Performed by: PHYSICIAN ASSISTANT

## 2024-04-08 NOTE — PROGRESS NOTES
Chief Complaint    Follow-up (Ck Left Ankle - Lateral Malleolus DOI 12/25/23)      History of Present Illness:  Janae Casillas is a 83 y.o. female       Previous history: Patient presents to the office today for a follow-up visit.  Patient being treated for a left ankle lateral malleolus fracture.  Patient approximately 15 weeks from injury.  Doing well.  She was complaining more stiffness at her last visit.  She was placed into physical therapy.  She is here to review response to therapy.  She did have another fall 5 days ago but noted no increase in ankle pain      Previous history: Patient has been in a tall boot for 4 weeks.  She is closer to 7 weeks from her date of injury.  She is doing better with less pain.      Previous History: Patient who presents to the office today for a follow-up visit.  Patient being treated for left ankle lateral malleolus fracture.  She is currently in a tall boot weightbearing as tolerated.  The ankle is feeling better with less pain.    Previous history: Patient who presents to the office today for new problem.  Patient here with a chief complaint of left lateral ankle pain.  Patient twisted her ankle over the weekend.  She jumped up after the neighbor knocked on her door and she rolled her ankle in an inversion manner.  She denies any medial ankle pain.  No proximal lower leg pain.  .    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

## 2024-06-03 ENCOUNTER — TELEPHONE (OUTPATIENT)
Dept: ORTHOPEDIC SURGERY | Age: 84
End: 2024-06-03

## 2024-06-03 DIAGNOSIS — M22.41 CHONDROMALACIA OF BOTH PATELLAE: ICD-10-CM

## 2024-06-03 DIAGNOSIS — M17.0 PRIMARY OSTEOARTHRITIS OF BOTH KNEES: ICD-10-CM

## 2024-06-03 DIAGNOSIS — M25.562 CHRONIC PAIN OF LEFT KNEE: ICD-10-CM

## 2024-06-03 DIAGNOSIS — M17.0 PRIMARY OSTEOARTHRITIS OF BOTH KNEES: Primary | ICD-10-CM

## 2024-06-03 DIAGNOSIS — G89.29 CHRONIC PAIN OF LEFT KNEE: ICD-10-CM

## 2024-06-03 DIAGNOSIS — M22.42 CHONDROMALACIA OF BOTH PATELLAE: Primary | ICD-10-CM

## 2024-06-03 DIAGNOSIS — M22.41 CHONDROMALACIA OF BOTH PATELLAE: Primary | ICD-10-CM

## 2024-06-03 DIAGNOSIS — M25.561 CHRONIC PAIN OF RIGHT KNEE: ICD-10-CM

## 2024-06-03 DIAGNOSIS — M22.42 CHONDROMALACIA OF BOTH PATELLAE: ICD-10-CM

## 2024-06-03 DIAGNOSIS — G89.29 CHRONIC PAIN OF RIGHT KNEE: ICD-10-CM

## 2024-06-03 RX ORDER — MELOXICAM 15 MG/1
15 TABLET ORAL DAILY
Qty: 30 TABLET | Refills: 3 | Status: SHIPPED | OUTPATIENT
Start: 2024-06-03

## 2024-06-03 NOTE — TELEPHONE ENCOUNTER
CALLED PATIENT BACK AND LET HER KNOW THAT A REQUEST HAS BEEN SENT IN FOR ORTHOVISC. WILL CALL PATIENT ONCE THEY HAVE BEEN APPROVED TO SCHEDULE. PATIENT NOT ELIGIBLE UNTIL 6/19/24

## 2024-06-03 NOTE — TELEPHONE ENCOUNTER
General Question     Subject: GABY KNEES INJECTION   Patient and /or Facility Request: Janae Casillas \"Bri\"   Contact Number: [phone]     PATIENT CALLING REQUESTING A CALL BACK FROM SOMEONE IN DR BRICEÑO'S OFFICE REGARDING GEL INJECTION IN BOTH OF HER KNEES.    PLEASE CALL THE PATIENT BACK AT THE ABOVE NUMBER

## 2024-06-07 DIAGNOSIS — M25.561 CHRONIC PAIN OF RIGHT KNEE: ICD-10-CM

## 2024-06-07 DIAGNOSIS — M22.41 CHONDROMALACIA OF BOTH PATELLAE: ICD-10-CM

## 2024-06-07 DIAGNOSIS — M17.0 PRIMARY OSTEOARTHRITIS OF BOTH KNEES: Primary | ICD-10-CM

## 2024-06-07 DIAGNOSIS — G89.29 CHRONIC PAIN OF LEFT KNEE: ICD-10-CM

## 2024-06-07 DIAGNOSIS — G89.29 CHRONIC PAIN OF RIGHT KNEE: ICD-10-CM

## 2024-06-07 DIAGNOSIS — M22.42 CHONDROMALACIA OF BOTH PATELLAE: ICD-10-CM

## 2024-06-07 DIAGNOSIS — M25.562 CHRONIC PAIN OF LEFT KNEE: ICD-10-CM

## 2024-06-28 ENCOUNTER — TELEPHONE (OUTPATIENT)
Dept: ORTHOPEDIC SURGERY | Age: 84
End: 2024-06-28

## 2024-06-28 NOTE — TELEPHONE ENCOUNTER
Spoke to patient and let them know that euflexxa injections have been approved for their BILATERAL knees. Scheduled patient for those injections.

## 2024-07-15 ENCOUNTER — OFFICE VISIT (OUTPATIENT)
Dept: ORTHOPEDIC SURGERY | Age: 84
End: 2024-07-15
Payer: MEDICARE

## 2024-07-15 VITALS — HEIGHT: 61 IN | WEIGHT: 156 LBS | BODY MASS INDEX: 29.45 KG/M2

## 2024-07-15 DIAGNOSIS — M25.561 CHRONIC PAIN OF RIGHT KNEE: ICD-10-CM

## 2024-07-15 DIAGNOSIS — M22.42 CHONDROMALACIA OF BOTH PATELLAE: ICD-10-CM

## 2024-07-15 DIAGNOSIS — G89.29 CHRONIC PAIN OF LEFT KNEE: ICD-10-CM

## 2024-07-15 DIAGNOSIS — M22.41 CHONDROMALACIA OF BOTH PATELLAE: ICD-10-CM

## 2024-07-15 DIAGNOSIS — M17.0 PRIMARY OSTEOARTHRITIS OF BOTH KNEES: Primary | ICD-10-CM

## 2024-07-15 DIAGNOSIS — G89.29 CHRONIC PAIN OF RIGHT KNEE: ICD-10-CM

## 2024-07-15 DIAGNOSIS — M25.562 CHRONIC PAIN OF LEFT KNEE: ICD-10-CM

## 2024-07-15 PROCEDURE — 99213 OFFICE O/P EST LOW 20 MIN: CPT | Performed by: FAMILY MEDICINE

## 2024-07-15 PROCEDURE — 20610 DRAIN/INJ JOINT/BURSA W/O US: CPT | Performed by: FAMILY MEDICINE

## 2024-07-15 PROCEDURE — 1123F ACP DISCUSS/DSCN MKR DOCD: CPT | Performed by: FAMILY MEDICINE

## 2024-07-15 RX ORDER — HYALURONATE SODIUM 10 MG/ML
40 SYRINGE (ML) INTRAARTICULAR ONCE
Status: COMPLETED | OUTPATIENT
Start: 2024-07-15 | End: 2024-07-15

## 2024-07-15 RX ADMIN — Medication 40 MG: at 09:27

## 2024-07-15 NOTE — PROGRESS NOTES
Chief Complaint    Knee Pain (EUFLEXXA #1 BILATERAL KNEES/)      Evaluation recurrent worsening right knee pain with known history of left knee multicompartment osteoarthritis status post completion of bilateral knee Euflexxa 5/31/2023    History of Present Illness:  Janae Casillas is a 84 y.o. female who is a very nice patient of Dr. Taco Caruso and longstanding patient of Dr. Everette Pavon is being seen today for evaluation of recurrent right greater than left knee pain.  Once again she is known to have fairly substantial bilateral knee patellofemoral arthropathy with medial compartment osteoarthritis left greater than right but is more symptomatic on the right-hand side.  She will last received her rounds of viscosupplementation her knees bilaterally on 3/9/2020 and was doing reasonably well up until early October 2020 when she began to notice a gradual onset of pain to the anterior medial portion of her knees.  There is no history of actual injury or new activity prior to becoming symptomatic.  She does complain of an achy pain which is fairly minor at rest at 1-2 out of 10 but when she is up and walking particular doing lots of stairs and distance walking or working out in the yard, her pain can be quite prominent at 5-6 out of 10.  She feels as if her Euflexxa is beginning to wear out.  She has been a little bit lax in performing her home-based exercise program and was able to wean off of her meloxicam when the Euflexxa was working properly for her.  Denies true locking catching or instability symptoms.  She is being seen today for orthopedic and sports consultation with updated imaging.      We last saw Bri in the office on 2/9/2022 when we finished up her Orthovisc injections to her knees bilaterally for underlying significant bilateral knee osteoarthritis with patellofemoral arthropathy.  She is done very well and just in the last few days she has had some recurrent achiness to the anterior and medial

## 2024-07-16 ENCOUNTER — APPOINTMENT (OUTPATIENT)
Dept: GENERAL RADIOLOGY | Age: 84
End: 2024-07-16
Payer: MEDICARE

## 2024-07-16 ENCOUNTER — HOSPITAL ENCOUNTER (EMERGENCY)
Age: 84
Discharge: HOME | End: 2024-07-16
Attending: EMERGENCY MEDICINE
Payer: MEDICARE

## 2024-07-16 ENCOUNTER — APPOINTMENT (OUTPATIENT)
Dept: CT IMAGING | Age: 84
End: 2024-07-16
Payer: MEDICARE

## 2024-07-16 VITALS
HEIGHT: 61 IN | BODY MASS INDEX: 27.38 KG/M2 | TEMPERATURE: 98.3 F | DIASTOLIC BLOOD PRESSURE: 69 MMHG | HEART RATE: 65 BPM | SYSTOLIC BLOOD PRESSURE: 160 MMHG | WEIGHT: 145 LBS | OXYGEN SATURATION: 94 % | RESPIRATION RATE: 17 BRPM

## 2024-07-16 DIAGNOSIS — I10 UNCONTROLLED HYPERTENSION: Primary | ICD-10-CM

## 2024-07-16 DIAGNOSIS — R51.9 NONINTRACTABLE HEADACHE, UNSPECIFIED CHRONICITY PATTERN, UNSPECIFIED HEADACHE TYPE: ICD-10-CM

## 2024-07-16 LAB
ALBUMIN SERPL-MCNC: 4 G/DL (ref 3.4–5)
ALBUMIN/GLOB SERPL: 1.5 {RATIO} (ref 1.1–2.2)
ALP SERPL-CCNC: 72 U/L (ref 40–129)
ALT SERPL-CCNC: 14 U/L (ref 10–40)
ANION GAP SERPL CALCULATED.3IONS-SCNC: 11 MMOL/L (ref 3–16)
AST SERPL-CCNC: 16 U/L (ref 15–37)
BASOPHILS # BLD: 0.1 K/UL (ref 0–0.2)
BASOPHILS NFR BLD: 0.7 %
BILIRUB SERPL-MCNC: <0.2 MG/DL (ref 0–1)
BILIRUB UR QL STRIP.AUTO: NEGATIVE
BUN SERPL-MCNC: 27 MG/DL (ref 7–20)
CALCIUM SERPL-MCNC: 8.9 MG/DL (ref 8.3–10.6)
CHLORIDE SERPL-SCNC: 102 MMOL/L (ref 99–110)
CLARITY UR: CLEAR
CO2 SERPL-SCNC: 25 MMOL/L (ref 21–32)
COLOR UR: ABNORMAL
CREAT SERPL-MCNC: 0.9 MG/DL (ref 0.6–1.2)
DEPRECATED RDW RBC AUTO: 15 % (ref 12.4–15.4)
EOSINOPHIL # BLD: 0.2 K/UL (ref 0–0.6)
EOSINOPHIL NFR BLD: 1.7 %
GFR SERPLBLD CREATININE-BSD FMLA CKD-EPI: 64 ML/MIN/{1.73_M2}
GLUCOSE SERPL-MCNC: 101 MG/DL (ref 70–99)
GLUCOSE UR STRIP.AUTO-MCNC: NEGATIVE MG/DL
HCT VFR BLD AUTO: 36.8 % (ref 36–48)
HGB BLD-MCNC: 12.1 G/DL (ref 12–16)
HGB UR QL STRIP.AUTO: ABNORMAL
KETONES UR STRIP.AUTO-MCNC: NEGATIVE MG/DL
LEUKOCYTE ESTERASE UR QL STRIP.AUTO: NEGATIVE
LYMPHOCYTES # BLD: 2.6 K/UL (ref 1–5.1)
LYMPHOCYTES NFR BLD: 29.8 %
MCH RBC QN AUTO: 29.2 PG (ref 26–34)
MCHC RBC AUTO-ENTMCNC: 32.9 G/DL (ref 31–36)
MCV RBC AUTO: 88.9 FL (ref 80–100)
MONOCYTES # BLD: 0.8 K/UL (ref 0–1.3)
MONOCYTES NFR BLD: 9.7 %
NEUTROPHILS # BLD: 5.1 K/UL (ref 1.7–7.7)
NEUTROPHILS NFR BLD: 58.1 %
NITRITE UR QL STRIP.AUTO: NEGATIVE
PH UR STRIP.AUTO: 6 [PH] (ref 5–8)
PLATELET # BLD AUTO: 264 K/UL (ref 135–450)
PMV BLD AUTO: 7 FL (ref 5–10.5)
POTASSIUM SERPL-SCNC: 3.8 MMOL/L (ref 3.5–5.1)
PROT SERPL-MCNC: 6.6 G/DL (ref 6.4–8.2)
PROT UR STRIP.AUTO-MCNC: NEGATIVE MG/DL
RBC # BLD AUTO: 4.14 M/UL (ref 4–5.2)
RBC #/AREA URNS HPF: NORMAL /HPF (ref 0–4)
SODIUM SERPL-SCNC: 138 MMOL/L (ref 136–145)
SP GR UR STRIP.AUTO: 1.01 (ref 1–1.03)
TROPONIN, HIGH SENSITIVITY: 19 NG/L (ref 0–14)
TROPONIN, HIGH SENSITIVITY: 21 NG/L (ref 0–14)
UA COMPLETE W REFLEX CULTURE PNL UR: ABNORMAL
UA DIPSTICK W REFLEX MICRO PNL UR: YES
URN SPEC COLLECT METH UR: ABNORMAL
UROBILINOGEN UR STRIP-ACNC: 0.2 E.U./DL
WBC # BLD AUTO: 8.8 K/UL (ref 4–11)
WBC #/AREA URNS HPF: NORMAL /HPF (ref 0–5)

## 2024-07-16 PROCEDURE — 81001 URINALYSIS AUTO W/SCOPE: CPT

## 2024-07-16 PROCEDURE — 84484 ASSAY OF TROPONIN QUANT: CPT

## 2024-07-16 PROCEDURE — 85025 COMPLETE CBC W/AUTO DIFF WBC: CPT

## 2024-07-16 PROCEDURE — 6370000000 HC RX 637 (ALT 250 FOR IP): Performed by: EMERGENCY MEDICINE

## 2024-07-16 PROCEDURE — 99285 EMERGENCY DEPT VISIT HI MDM: CPT

## 2024-07-16 PROCEDURE — 36415 COLL VENOUS BLD VENIPUNCTURE: CPT

## 2024-07-16 PROCEDURE — 70450 CT HEAD/BRAIN W/O DYE: CPT

## 2024-07-16 PROCEDURE — 93005 ELECTROCARDIOGRAM TRACING: CPT | Performed by: EMERGENCY MEDICINE

## 2024-07-16 PROCEDURE — 71045 X-RAY EXAM CHEST 1 VIEW: CPT

## 2024-07-16 PROCEDURE — 80053 COMPREHEN METABOLIC PANEL: CPT

## 2024-07-16 RX ORDER — AMLODIPINE BESYLATE 5 MG/1
5 TABLET ORAL ONCE
Status: COMPLETED | OUTPATIENT
Start: 2024-07-16 | End: 2024-07-16

## 2024-07-16 RX ADMIN — AMLODIPINE BESYLATE 5 MG: 5 TABLET ORAL at 02:31

## 2024-07-16 ASSESSMENT — PAIN DESCRIPTION - DESCRIPTORS: DESCRIPTORS: ACHING

## 2024-07-16 ASSESSMENT — PAIN - FUNCTIONAL ASSESSMENT: PAIN_FUNCTIONAL_ASSESSMENT: 0-10

## 2024-07-16 ASSESSMENT — PAIN SCALES - GENERAL: PAINLEVEL_OUTOF10: 7

## 2024-07-16 ASSESSMENT — PAIN DESCRIPTION - LOCATION: LOCATION: HEAD

## 2024-07-16 ASSESSMENT — PAIN DESCRIPTION - PAIN TYPE: TYPE: ACUTE PAIN

## 2024-07-16 NOTE — ED PROVIDER NOTES
Emergency Department Provider Note  Location: [facility]  ED  7/16/2024     Patient Identification  Janae Casillas is a 84 y.o. female    Chief Complaint  Hypertension (175/91 at home. Pt states she had been waking up with headaches and tonight she checked her BP. Pt states her doctor recently took her off her BP medications worried about her kidneys )          HPI  (History provided by patient)  Patient is an 84-year-old female who presents with headache and concerns about uncontrolled blood pressure.  Reports that she was taken off of her antihypertensives 3 years ago 1 of which was amlodipine unsure what the other medication was.  Reports that she has been noticing when she checks her blood pressure daily it has been going higher and higher it has been in the 180s over 110s over the past week.  Past few nights she has woken up in the melanite with a bifrontal headache.  She woke up tonight after going to sleep at about 9:00 with no headache and woke up with a moderate bifrontal headache.  No neck pain or new stiffness no associated vision changes numbness weakness or vomiting.  Checked her blood pressure and was elevated at home.  Denies any chest pain shortness of breath urinary symptoms or flank pain.  Reports her primary doctor is concerned about her kidney function is asking to have this checked.      Nursing Notes were all reviewed and agreed with, or any disagreements were addressed in the HPI:  Allergies:   Allergies   Allergen Reactions    Latex     Morphine And Codeine Anaphylaxis and Nausea And Vomiting    Percocet [Oxycodone-Acetaminophen] Shortness Of Breath     resp distress    Codeine      Other reaction(s): Not Recorded    Tetracycline     Tetracyclines & Related Swelling    Morphine Nausea And Vomiting       Past medical history:  has a past medical history of Asthma, Ear discomfort, Hepatitis C, Hypertension, Liver disease, Lung disorder, Macular degeneration, Osteoarthritis, and

## 2024-07-17 LAB
EKG ATRIAL RATE: 65 BPM
EKG DIAGNOSIS: NORMAL
EKG P AXIS: 82 DEGREES
EKG P-R INTERVAL: 186 MS
EKG Q-T INTERVAL: 368 MS
EKG QRS DURATION: 76 MS
EKG QTC CALCULATION (BAZETT): 382 MS
EKG R AXIS: 59 DEGREES
EKG T AXIS: -63 DEGREES
EKG VENTRICULAR RATE: 65 BPM

## 2024-07-17 PROCEDURE — 93010 ELECTROCARDIOGRAM REPORT: CPT | Performed by: INTERNAL MEDICINE

## 2024-07-22 ENCOUNTER — OFFICE VISIT (OUTPATIENT)
Dept: ORTHOPEDIC SURGERY | Age: 84
End: 2024-07-22
Payer: MEDICARE

## 2024-07-22 DIAGNOSIS — M25.562 CHRONIC PAIN OF LEFT KNEE: ICD-10-CM

## 2024-07-22 DIAGNOSIS — G89.29 CHRONIC PAIN OF RIGHT KNEE: ICD-10-CM

## 2024-07-22 DIAGNOSIS — M25.561 CHRONIC PAIN OF RIGHT KNEE: ICD-10-CM

## 2024-07-22 DIAGNOSIS — M22.41 CHONDROMALACIA OF BOTH PATELLAE: ICD-10-CM

## 2024-07-22 DIAGNOSIS — M17.0 PRIMARY OSTEOARTHRITIS OF BOTH KNEES: Primary | ICD-10-CM

## 2024-07-22 DIAGNOSIS — M22.42 CHONDROMALACIA OF BOTH PATELLAE: ICD-10-CM

## 2024-07-22 DIAGNOSIS — G89.29 CHRONIC PAIN OF LEFT KNEE: ICD-10-CM

## 2024-07-22 PROCEDURE — 20610 DRAIN/INJ JOINT/BURSA W/O US: CPT | Performed by: FAMILY MEDICINE

## 2024-07-22 RX ORDER — HYALURONATE SODIUM 10 MG/ML
40 SYRINGE (ML) INTRAARTICULAR ONCE
Status: COMPLETED | OUTPATIENT
Start: 2024-07-22 | End: 2024-07-22

## 2024-07-22 RX ADMIN — Medication 40 MG: at 09:30

## 2024-07-22 NOTE — PROGRESS NOTES
CC:  FU Knee Osteoarthritis with Viscosupplementation       HPI: History of Present Illness:    Bri was last seen in the office on 7/12/2021 we completed viscosupplementation with Visco 3 to her knees.  She did reasonably well become a little more sore lately.  She is trying to perform her exercise program and does take Aleve very episodically and also utilize her Voltaren gel on an episodic basis.  There is no recent injury to her knee but she did have an episode in roughly Thanksgiving 2021 which a frozen bottle of water fell onto the top of her freezer striking the distal aspect of her right foot.  She did have immediate pain and did develop some occultly with walking and had only mild swelling but no ecchymosis.  She does have underlying hammer toeing to toes 2 through 4 believing that she had she sustained a contusion put off initial evaluation but due to persistence of pain, she was seen in after-hours on 1/21/2002 and subsequently in the emergency room on 1/23/2022 for episodic foot pain.  Her x-rays are negative for subacute fracture at that point and did show hammer toeing.  She was encouraged to utilize her Voltaren gel and seek follow-up with her podiatrist Dr. Bran and does have an appointment to see them on 1/28/2022.  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  She does not have supplemental orthotics.  She does have episodic pain and while she does have some soreness over the heel she is also having some lateral ankle tenderness likely consistent with peroneal tendinitis.  She is a type of rehabilitation.    We last outlined in the office on 1/26/2022 and was started once again on Orthovisc to her knees bilaterally. She presents back today stating that she is actually feeling better and has noticed a moderate improvement

## 2024-07-23 ENCOUNTER — TELEPHONE (OUTPATIENT)
Dept: ORTHOPEDIC SURGERY | Age: 84
End: 2024-07-23

## 2024-07-23 NOTE — TELEPHONE ENCOUNTER
General Question     Subject: LT SHOULDER PAIN/REQ A CALL BACK  Patient and /or Facility Request: Janae Casillas \"Bri\"   Contact Number: [phone]     PATIENT CALLED IN TO SEE IF SHE CAN SPEAK TO SOMEONE IN THE OFFICE REGARDING HER LT SHOULDER.. PATIENT STATES THAT HER HAND AND WRIST IS SWOLLEN.. PATIENT REQ A CALL BACK...    PLEASE ADVISE

## 2024-07-23 NOTE — TELEPHONE ENCOUNTER
Spoke with patient, states her wrist is swollen and a little bit painful, no injury, moved from elbow into wrist overnight. I spoke with Dr. Daniels and he recommended trying a wrist splint and seeing if that was helpful until we could see her tomorrow. Told her it could be multiple different things - recently started a new medicine for BP. She will ice consistently and use her voltaren gel and see if those two things are helpful. Dr. Daniels said if her whole arm became swollen he would recommend going to the ED.

## 2024-07-24 ENCOUNTER — OFFICE VISIT (OUTPATIENT)
Dept: ORTHOPEDIC SURGERY | Age: 84
End: 2024-07-24
Payer: MEDICARE

## 2024-07-24 DIAGNOSIS — M19.032 PRIMARY OSTEOARTHRITIS OF LEFT WRIST: ICD-10-CM

## 2024-07-24 DIAGNOSIS — M25.532 LEFT WRIST PAIN: ICD-10-CM

## 2024-07-24 DIAGNOSIS — M19.012 LOCALIZED OSTEOARTHRITIS OF LEFT SHOULDER: ICD-10-CM

## 2024-07-24 DIAGNOSIS — M50.30 DDD (DEGENERATIVE DISC DISEASE), CERVICAL: ICD-10-CM

## 2024-07-24 DIAGNOSIS — M54.2 NECK PAIN: ICD-10-CM

## 2024-07-24 DIAGNOSIS — M75.82 TENDINITIS OF LEFT ROTATOR CUFF: ICD-10-CM

## 2024-07-24 DIAGNOSIS — M25.432 SWELLING OF LEFT WRIST: ICD-10-CM

## 2024-07-24 DIAGNOSIS — M54.12 RADICULITIS OF LEFT CERVICAL REGION: ICD-10-CM

## 2024-07-24 DIAGNOSIS — M25.512 LEFT SHOULDER PAIN, UNSPECIFIED CHRONICITY: Primary | ICD-10-CM

## 2024-07-24 PROCEDURE — 1123F ACP DISCUSS/DSCN MKR DOCD: CPT | Performed by: FAMILY MEDICINE

## 2024-07-24 PROCEDURE — 99214 OFFICE O/P EST MOD 30 MIN: CPT | Performed by: FAMILY MEDICINE

## 2024-07-24 PROCEDURE — L3908 WHO COCK-UP NONMOLDE PRE OTS: HCPCS | Performed by: FAMILY MEDICINE

## 2024-07-24 RX ORDER — METHYLPREDNISOLONE 4 MG/1
TABLET ORAL
Qty: 21 KIT | Refills: 0 | Status: SHIPPED | OUTPATIENT
Start: 2024-07-24

## 2024-07-24 NOTE — PROGRESS NOTES
Reason for exam:     Answer:   pain       Treatment Plan:  Treatment options were discussed with Janae Casillas.  We did review her current plain films and exam findings.  She became symptomatic initially with left-sided cervical pain and left arm pain with subsequent shoulder pain on 7/16/2024.  There is no history of injury or new activity or fall prior to becoming symptomatic and did have a negative cardiac workup for arm achiness at [facility] recently.  I suspect we are dealing with multiple etiologies of her symptoms.  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  She does have underlying glenohumeral arthropathy and clinical impingement as well after having her left rotator cuff repair per Dr. Swan in late 2014 on the left.  Her newer onset of swelling to her wrist and hand could represent a flare of pseudogout versus aggravation of osteoarthritis or change in her blood pressure medications.  It is unilateral however.  I would like for her to have a cervical MRI to evaluate degree of cervical degenerative changes and rule out a left-sided C4-5 to C6-7 disc protrusion and/or foraminal narrowing.  We did place her on a Medrol Dosepak to be followed by having her continue with her meloxicam 15 mg daily and she does have muscle relaxers to take primarily at night in the emergency room.  We gave her cock up wrist splint.  I will see her back post cervical imaging although I think think she can schedule therapy for her cervical spine and shoulder initially pending the results of her MRI.  Will see her back post imaging and she will contact us in the interim with questions or concerns.      This dictation was performed with a verbal recognition program (DRAGON) and it was checked for errors. It is possible that there are still dictated errors within this office

## 2024-07-25 ENCOUNTER — TELEPHONE (OUTPATIENT)
Dept: ORTHOPEDIC SURGERY | Age: 84
End: 2024-07-25

## 2024-07-25 NOTE — TELEPHONE ENCOUNTER
Spoke to patient and informed them that their MRI has been authorized and that they can call and schedule scan at their convenience. Also told them that they can call and schedule a f/u with Dr. Daniels once they have MRI scheduled, leaving at least 2-3 days for our office to receive their results.

## 2024-07-29 ENCOUNTER — OFFICE VISIT (OUTPATIENT)
Dept: ORTHOPEDIC SURGERY | Age: 84
End: 2024-07-29
Payer: MEDICARE

## 2024-07-29 DIAGNOSIS — M25.512 LEFT SHOULDER PAIN, UNSPECIFIED CHRONICITY: ICD-10-CM

## 2024-07-29 DIAGNOSIS — M54.12 RADICULITIS OF LEFT CERVICAL REGION: ICD-10-CM

## 2024-07-29 DIAGNOSIS — M17.0 PRIMARY OSTEOARTHRITIS OF BOTH KNEES: ICD-10-CM

## 2024-07-29 DIAGNOSIS — M22.41 CHONDROMALACIA OF BOTH PATELLAE: Primary | ICD-10-CM

## 2024-07-29 DIAGNOSIS — G89.29 CHRONIC PAIN OF RIGHT KNEE: ICD-10-CM

## 2024-07-29 DIAGNOSIS — M25.562 CHRONIC PAIN OF LEFT KNEE: ICD-10-CM

## 2024-07-29 DIAGNOSIS — M54.2 NECK PAIN: ICD-10-CM

## 2024-07-29 DIAGNOSIS — M22.42 CHONDROMALACIA OF BOTH PATELLAE: Primary | ICD-10-CM

## 2024-07-29 DIAGNOSIS — M25.561 CHRONIC PAIN OF RIGHT KNEE: ICD-10-CM

## 2024-07-29 DIAGNOSIS — M25.532 LEFT WRIST PAIN: ICD-10-CM

## 2024-07-29 DIAGNOSIS — G89.29 CHRONIC PAIN OF LEFT KNEE: ICD-10-CM

## 2024-07-29 DIAGNOSIS — M50.30 DDD (DEGENERATIVE DISC DISEASE), CERVICAL: ICD-10-CM

## 2024-07-29 PROCEDURE — 99213 OFFICE O/P EST LOW 20 MIN: CPT | Performed by: FAMILY MEDICINE

## 2024-07-29 PROCEDURE — 1123F ACP DISCUSS/DSCN MKR DOCD: CPT | Performed by: FAMILY MEDICINE

## 2024-07-29 PROCEDURE — 20610 DRAIN/INJ JOINT/BURSA W/O US: CPT | Performed by: FAMILY MEDICINE

## 2024-07-29 RX ORDER — HYALURONATE SODIUM 10 MG/ML
40 SYRINGE (ML) INTRAARTICULAR ONCE
Status: COMPLETED | OUTPATIENT
Start: 2024-07-29 | End: 2024-07-29

## 2024-07-29 RX ADMIN — Medication 40 MG: at 09:23

## 2024-07-29 NOTE — PROGRESS NOTES
Chief Complaint  Knee Pain (EUFLEXXA #3 GABY KNEES ), Neck Pain, Wrist Pain, and Arm Pain      FU newer onset of left-sided cervical pain left shoulder and arm pain with achiness and left wrist and hand swelling    Recheck bilateral knee pain with known history of osteoarthritis patellofemoral arthropathy with continuation of Euflexxa    History of Present Illness:  Janae Casillas is a 84 y.o. female she does have a history of high blood pressure as well as knee osteoarthritis and recently completed viscosupplementation to her knees which are doing well and does have a history of pseudogout and is status post bilateral shoulder rotator cuff repairs remotely with the left 1 being done by Dr. Swan in late 2014 who is a very nice patient of Dr. Taco Caruso who is being seen today for evaluation of a new orthopedic conditions.  We once again just had finished up viscosupplementation recently to her knees which are doing outstanding.  She states on on about 7/16/2024, she awoke with achiness to her arm and stiffness to the left side of her cervical spine.  There is no history of injury fall or trauma.  It did progressively worsen and she attributes some of this to elevations in her breast pressure as they were recently changing around her blood pressure and is now on Norvasc as opposed to triamterene which was discontinued.  Due to worsening cervical pain and headaches, she was seen initially at [facility] where she did have elevations in heart enzymes and was subsequently followed up at [facility] where her cardiac workup was negative and was told she might have a \"strained muscle\" and was given muscle relaxers.  She had held off on her meloxicam.  When we saw her recently to finish up her viscosupplementation for her knee, she mentioned her cervical pain and left arm pain with achiness would recommend that she see us today for evaluation.  She still has left-sided cervical stiffness and achiness to the arthritic with

## 2024-08-01 ENCOUNTER — TELEPHONE (OUTPATIENT)
Dept: ORTHOPEDIC SURGERY | Age: 84
End: 2024-08-01

## 2024-08-01 DIAGNOSIS — M50.30 DDD (DEGENERATIVE DISC DISEASE), CERVICAL: ICD-10-CM

## 2024-08-01 DIAGNOSIS — M54.12 RADICULITIS OF LEFT CERVICAL REGION: Primary | ICD-10-CM

## 2024-08-01 RX ORDER — GABAPENTIN 100 MG/1
100 CAPSULE ORAL NIGHTLY
Qty: 30 CAPSULE | Refills: 0 | Status: SHIPPED | OUTPATIENT
Start: 2024-08-01 | End: 2024-08-31

## 2024-08-01 NOTE — TELEPHONE ENCOUNTER
SPOKE TO PATIENT, SHE HAD HER CERVICAL MRI YESTERDAY. SHE HAS THERAPY MONDAY. SCHEDULED HER FOR F/U TO GO OVER RESULTS ON MONDAY. SPOKE WITH DR. BRICEÑO AND HE WANTED TO SEND OVER A LOW DOSE OF GABAPENTIN TO SEE IF THAT WOULD HELP WITH HER ARM PAIN WHICH HE BELIEVES IS COMING FROM HER NECK. SHE WILL START THAT THIS EVENING AND SEE HOW SHE DOES THROUGH THE WEEKEND.

## 2024-08-01 NOTE — TELEPHONE ENCOUNTER
General Question     Subject: WOKE WITH ARM PAIN AGAIN   Patient and /or Facility Request:Janae Casillas \"Bri\"   Contact Number: [phone]  ]    Pt ASKING WHAT DR BRICEÑO THINKS. NO SWELLING,  BUT SHE WOKE WITH THE SAME ARM PAIN.     WHAT SHOULD SHE DO/TAKE FOR IT? Pt GOES TO PT ON MONDAY.  PLEASE CALL.

## 2024-08-05 ENCOUNTER — OFFICE VISIT (OUTPATIENT)
Dept: ORTHOPEDIC SURGERY | Age: 84
End: 2024-08-05
Payer: MEDICARE

## 2024-08-05 ENCOUNTER — HOSPITAL ENCOUNTER (OUTPATIENT)
Dept: PHYSICAL THERAPY | Age: 84
Setting detail: THERAPIES SERIES
Discharge: HOME | End: 2024-08-05
Payer: MEDICARE

## 2024-08-05 VITALS — BODY MASS INDEX: 27.38 KG/M2 | WEIGHT: 145 LBS | HEIGHT: 61 IN

## 2024-08-05 DIAGNOSIS — M65.9 SYNOVITIS OF LEFT WRIST: Primary | ICD-10-CM

## 2024-08-05 DIAGNOSIS — M54.2 NECK PAIN: ICD-10-CM

## 2024-08-05 DIAGNOSIS — M19.012 OSTEOARTHRITIS OF LEFT SHOULDER, UNSPECIFIED OSTEOARTHRITIS TYPE: ICD-10-CM

## 2024-08-05 DIAGNOSIS — M54.12 RADICULITIS OF LEFT CERVICAL REGION: ICD-10-CM

## 2024-08-05 DIAGNOSIS — M50.00 HNP (HERNIATED NUCLEUS PULPOSUS) WITH MYELOPATHY, CERVICAL: ICD-10-CM

## 2024-08-05 DIAGNOSIS — M25.432 PAIN AND SWELLING OF LEFT WRIST: ICD-10-CM

## 2024-08-05 DIAGNOSIS — M54.12 CERVICAL RADICULOPATHY: ICD-10-CM

## 2024-08-05 DIAGNOSIS — M25.532 PAIN AND SWELLING OF LEFT WRIST: ICD-10-CM

## 2024-08-05 DIAGNOSIS — M25.512 LEFT SHOULDER PAIN, UNSPECIFIED CHRONICITY: Primary | ICD-10-CM

## 2024-08-05 DIAGNOSIS — M50.30 DDD (DEGENERATIVE DISC DISEASE), CERVICAL: ICD-10-CM

## 2024-08-05 DIAGNOSIS — M75.82 TENDINITIS OF LEFT ROTATOR CUFF: ICD-10-CM

## 2024-08-05 PROBLEM — M65.932 SYNOVITIS OF LEFT WRIST: Status: ACTIVE | Noted: 2024-08-05

## 2024-08-05 PROCEDURE — 97161 PT EVAL LOW COMPLEX 20 MIN: CPT | Performed by: PHYSICAL THERAPIST

## 2024-08-05 PROCEDURE — 99213 OFFICE O/P EST LOW 20 MIN: CPT | Performed by: FAMILY MEDICINE

## 2024-08-05 PROCEDURE — 1123F ACP DISCUSS/DSCN MKR DOCD: CPT | Performed by: FAMILY MEDICINE

## 2024-08-05 PROCEDURE — 97110 THERAPEUTIC EXERCISES: CPT | Performed by: PHYSICAL THERAPIST

## 2024-08-05 NOTE — PLAN OF CARE
prior level of function with activities such as sleeping and lifting objects around home.  [] Progressing: [] Met: [] Not Met: [] Adjusted  2. Patient will demonstrate increased AROM of L shoulder flexion, abduction, ER, and IR to >145°, >145°, C7, and T12 respectively without pain to allow for proper joint functioning to enable patient to return to PLOF for ADLs.   [] Progressing: [] Met: [] Not Met: [] Adjusted  3. Patient will demonstrate increased Strength of L shoulder flexion, abduction, and ER to at least 4+/5 throughout without pain to allow for proper functional mobility to enable patient to return to PLOF for ADLs.   [] Progressing: [] Met: [] Not Met: [] Adjusted  4. Patient will return to cooking and cleaning for household management with 2/10 pain or less in L shoulder and wrist so that she may return to PLOF for ADLs.   [] Progressing: [] Met: [] Not Met: [] Adjusted  5. Patient will be able to sleep >5 hours without disruption secondary to pain in L shoulder or wrist so that she may return to PLOF.  [] Progressing: [] Met: [] Not Met: [] Adjusted     Overall Progression Towards Functional goals/ Treatment Progress Update:  [] Patient is progressing as expected towards functional goals listed.    [] Progression is slowed due to complexities/Impairments listed.  [] Progression has been slowed due to co-morbidities.  [x] Plan just implemented, too soon (<30days) to assess goals progression   [] Goals require adjustment due to lack of progress  [] Patient is not progressing as expected and requires additional follow up with physician  [] Other:     TREATMENT PLAN     Frequency/Duration: 1-2x/week for 4-6 weeks for the following treatment interventions:    Interventions:  Therapeutic Exercise (03731) including: strength training, ROM, and functional mobility  Therapeutic Activities (93092) including: functional mobility training and education.  Neuromuscular Re-education (37465) activation and

## 2024-08-05 NOTE — PROGRESS NOTES
yoli to utilize underneath this.  While her cervical symptoms have improved substantially 75% seems to be having less arm pain I would like for her to follow-up with Kerri Henley in about 6 weeks to see how she is doing with her arm pain neck pain following rehab.  Potential for EMG testing was also discussed but has only been symptomatic for about 3 weeks in her left upper extremity.  Will have her see hand therapy and see her back in a few weeks.  I will see her back for her left wrist in 4 to 6 weeks.  She will contact us sooner with questions or concerns.        This dictation was performed with a verbal recognition program (DRAGON) and it was checked for errors. It is possible that there are still dictated errors within this office note. If so, please bring any errors to my attention for an addendum. All efforts were made to ensure that this office note is accurate.

## 2024-08-09 ENCOUNTER — HOSPITAL ENCOUNTER (OUTPATIENT)
Dept: OCCUPATIONAL THERAPY | Age: 84
Setting detail: THERAPIES SERIES
Discharge: HOME | End: 2024-08-09
Payer: MEDICARE

## 2024-08-09 DIAGNOSIS — M25.632 STIFFNESS OF LEFT WRIST JOINT: Primary | ICD-10-CM

## 2024-08-09 PROCEDURE — 97110 THERAPEUTIC EXERCISES: CPT | Performed by: OCCUPATIONAL THERAPIST

## 2024-08-09 PROCEDURE — 97165 OT EVAL LOW COMPLEX 30 MIN: CPT | Performed by: OCCUPATIONAL THERAPIST

## 2024-08-09 NOTE — PLAN OF CARE
[facility]- Outpatient Rehabilitation and Therapy  [address], [place] [address] office: [phone] fax: [phone]     Occupational Therapy Initial Evaluation Certification      Dear Selvin Daniels MD,    We had the pleasure of evaluating the following patient for occupational therapy services at [facility] Outpatient Occupational Therapy.  A summary of our findings can be found in the initial assessment below.  This includes our plan of care.  If you have any questions or concerns regarding these findings, please do not hesitate to contact me at the office phone number listed above.  Thank you for the referral.     Physician Signature:_______________________________Date:__________________  By signing above (or electronic signature), therapist’s plan is approved by physician       Occupational Therapy: TREATMENT/PROGRESS NOTE   Patient: Janae Casillas (84 y.o. female)   Examination Date: 2024   :  1940 MRN: [id number]   Visit #: 1  Insurance Allowable Auth Needed    []Yes    []No    Insurance: Payor: AETNA MEDICARE / Plan: AETNA MEDICARE-ADVANTAGE PPO / Product Type: Medicare /   Insurance ID: [id number] - (Medicare Managed)  Secondary Insurance (if applicable):    Treatment Diagnosis:     ICD-10-CM    1. Stiffness of left wrist joint  M25.632          Medical Diagnosis:  Synovitis of left wrist [M65.9]  Pain and swelling of left wrist [M25.532, M25.432]   Referring Physician: Selvin Daniels MD  PCP: Taco Caruso MD     DOI: started about 2 weeks ago     DOS:    Plan of care signed (Y/N):     Date of Patient follow up with Physician:      Vineet Date: 24  Progress Report/POC: EVAL today  POC update due: (10 visits /OR AUTH LIMITS, whichever is less)  2024                                             Precautions/ Contra-indications:           Latex allergy:  NO  Pacemaker:    NO  Contraindications for Manipulation: None  Other:    Red Flags:  None    C-SSRS

## 2024-08-13 ENCOUNTER — HOSPITAL ENCOUNTER (OUTPATIENT)
Dept: PHYSICAL THERAPY | Age: 84
Setting detail: THERAPIES SERIES
Discharge: HOME | End: 2024-08-13
Payer: MEDICARE

## 2024-08-13 PROCEDURE — 97140 MANUAL THERAPY 1/> REGIONS: CPT | Performed by: PHYSICAL THERAPIST

## 2024-08-13 PROCEDURE — 97110 THERAPEUTIC EXERCISES: CPT | Performed by: PHYSICAL THERAPIST

## 2024-08-13 NOTE — FLOWSHEET NOTE
[facility]- Outpatient Rehabilitation and Therapy  [address], [place] [address] office: [phone] fax: [phone]     Physical Therapy Initial Evaluation Certification      Dear Selvin Daniels MD,    We had the pleasure of evaluating the following patient for physical therapy services at [facility] Outpatient Physical Therapy.  A summary of our findings can be found in the initial assessment below.  This includes our plan of care.  If you have any questions or concerns regarding these findings, please do not hesitate to contact me at the office phone number listed above.  Thank you for the referral.     Physician Signature:_______________________________Date:__________________  By signing above (or electronic signature), therapist’s plan is approved by physician       Physical Therapy: TREATMENT/PROGRESS NOTE   Patient: Janae Casillas (84 y.o. female)   Examination Date: 2024   :  1940 MRN: [id number]   Visit #: 2   Insurance Allowable Auth Needed   Med nec []Yes    [x]No    Insurance: Payor: AETNA MEDICARE / Plan: AETNA MEDICARE-ADVANTAGE PPO / Product Type: Medicare /   Insurance ID: [id number] - (Medicare Managed)  Secondary Insurance (if applicable):    Treatment Diagnosis:   Cervical pain M54.2     Medical Diagnosis:  Left shoulder pain, unspecified chronicity [M25.512]  Neck pain [M54.2]  DDD (degenerative disc disease), cervical [M50.30]  Radiculitis of left cervical region [M54.12]  Localized osteoarthritis of left shoulder [M19.012]  Tendinitis of left rotator cuff [M75.82]   Referring Physician: Selvin Daniels MD  PCP: Taco Caruso MD     Plan of care signed (Y/N): N    Date of Patient follow up with Physician: 2024     Plan of Care Report: NO  POC update due: (10 visits /OR AUTH LIMITS, whichever is less)  2024                                             Medical History:  Comorbidities:  Hypertension  Relevant Medical History: HTN, pseudo

## 2024-08-20 ENCOUNTER — APPOINTMENT (OUTPATIENT)
Dept: PHYSICAL THERAPY | Age: 84
End: 2024-08-20
Payer: MEDICARE

## 2024-08-23 ENCOUNTER — HOSPITAL ENCOUNTER (OUTPATIENT)
Dept: PHYSICAL THERAPY | Age: 84
Setting detail: THERAPIES SERIES
Discharge: HOME | End: 2024-08-23
Payer: MEDICARE

## 2024-08-23 PROCEDURE — 97110 THERAPEUTIC EXERCISES: CPT | Performed by: PHYSICAL THERAPIST

## 2024-08-23 PROCEDURE — 97140 MANUAL THERAPY 1/> REGIONS: CPT | Performed by: PHYSICAL THERAPIST

## 2024-08-23 NOTE — FLOWSHEET NOTE
assist with reaching prior level of function with activities such as sleeping and lifting objects around home.  [] Progressing: [] Met: [] Not Met: [] Adjusted  2. Patient will demonstrate increased AROM of L shoulder flexion, abduction, ER, and IR to >145°, >145°, C7, and T12 respectively without pain to allow for proper joint functioning to enable patient to return to PLOF for ADLs.   [] Progressing: [] Met: [] Not Met: [] Adjusted  3. Patient will demonstrate increased Strength of L shoulder flexion, abduction, and ER to at least 4+/5 throughout without pain to allow for proper functional mobility to enable patient to return to PLOF for ADLs.   [] Progressing: [] Met: [] Not Met: [] Adjusted  4. Patient will return to cooking and cleaning for household management with 2/10 pain or less in L shoulder and wrist so that she may return to PLOF for ADLs.   [] Progressing: [] Met: [] Not Met: [] Adjusted  5. Patient will be able to sleep >5 hours without disruption secondary to pain in L shoulder or wrist so that she may return to PLOF.  [] Progressing: [] Met: [] Not Met: [] Adjusted     Overall Progression Towards Functional goals/ Treatment Progress Update:  [] Patient is progressing as expected towards functional goals listed.    [] Progression is slowed due to complexities/Impairments listed.  [] Progression has been slowed due to co-morbidities.  [x] Plan just implemented, too soon (<30days) to assess goals progression   [] Goals require adjustment due to lack of progress  [] Patient is not progressing as expected and requires additional follow up with physician  [] Other:     TREATMENT PLAN     Frequency/Duration: 1-2x/week for 4-6 weeks for the following treatment interventions:    Interventions:  Therapeutic Exercise (41265) including: strength training, ROM, and functional mobility  Therapeutic Activities (99988) including: functional mobility training and education.  Neuromuscular Re-education (68000)

## 2024-08-26 ENCOUNTER — HOSPITAL ENCOUNTER (OUTPATIENT)
Dept: PHYSICAL THERAPY | Age: 84
Setting detail: THERAPIES SERIES
Discharge: HOME | End: 2024-08-26
Payer: MEDICARE

## 2024-08-26 ENCOUNTER — TELEPHONE (OUTPATIENT)
Dept: ORTHOPEDIC SURGERY | Age: 84
End: 2024-08-26

## 2024-08-26 DIAGNOSIS — M25.532 PAIN AND SWELLING OF LEFT WRIST: Primary | ICD-10-CM

## 2024-08-26 DIAGNOSIS — M25.432 PAIN AND SWELLING OF LEFT WRIST: Primary | ICD-10-CM

## 2024-08-26 DIAGNOSIS — M54.12 RADICULITIS OF LEFT CERVICAL REGION: ICD-10-CM

## 2024-08-26 DIAGNOSIS — M50.00 HNP (HERNIATED NUCLEUS PULPOSUS) WITH MYELOPATHY, CERVICAL: Primary | ICD-10-CM

## 2024-08-26 DIAGNOSIS — Z87.39 HISTORY OF PSEUDOGOUT: ICD-10-CM

## 2024-08-26 DIAGNOSIS — M50.30 DDD (DEGENERATIVE DISC DISEASE), CERVICAL: ICD-10-CM

## 2024-08-26 PROCEDURE — 97140 MANUAL THERAPY 1/> REGIONS: CPT | Performed by: PHYSICAL THERAPIST

## 2024-08-26 PROCEDURE — 97110 THERAPEUTIC EXERCISES: CPT | Performed by: PHYSICAL THERAPIST

## 2024-08-26 RX ORDER — METHYLPREDNISOLONE 4 MG
TABLET, DOSE PACK ORAL
Qty: 21 KIT | Refills: 0 | Status: SHIPPED | OUTPATIENT
Start: 2024-08-26

## 2024-08-26 NOTE — TELEPHONE ENCOUNTER
General Question     Subject: HAND PAIN WORSE?   Patient and /or Facility Request: Janae Casillas \"Bri\"   Contact Number: [phone]     WHAT SHOULD Pt DO AS THE HAND PAIN SHE MENTIONED AT APPT LAST WEEK IS WORSE THIS WEEK?     IF POSSIBLE, GIOVANI, PLEASE CALL TO ADVISE. Pt DOES HAVE PT APPT AND MAY STOP IN AROUND 11.

## 2024-08-26 NOTE — FLOWSHEET NOTE
[facility]- Outpatient Rehabilitation and Therapy  [address], [place] [address] office: [phone] fax: [phone]         Physical Therapy: TREATMENT/PROGRESS NOTE   Patient: Janae Casillas (84 y.o. female)   Examination Date: 2024   :  1940 MRN: [id number]   Visit #: 4   Insurance Allowable Auth Needed   Med nec []Yes    [x]No    Insurance: Payor: AETNA MEDICARE / Plan: AETNA MEDICARE-ADVANTAGE PPO / Product Type: Medicare /   Insurance ID: [id number] - (Medicare Managed)  Secondary Insurance (if applicable):    Treatment Diagnosis:   Cervical pain M54.2     Medical Diagnosis:  Left shoulder pain, unspecified chronicity [M25.512]  Neck pain [M54.2]  DDD (degenerative disc disease), cervical [M50.30]  Radiculitis of left cervical region [M54.12]  Localized osteoarthritis of left shoulder [M19.012]  Tendinitis of left rotator cuff [M75.82]   Referring Physician: Selvin Daniels MD  PCP: Taco Caruso MD     Plan of care signed (Y/N): N    Date of Patient follow up with Physician: 2024     Plan of Care Report: NO  POC update due: (10 visits /OR AUTH LIMITS, whichever is less)  2024                                             Medical History:  Comorbidities:  Hypertension  Relevant Medical History: HTN, pseudo gout                                         Precautions/ Contra-indications:           Latex allergy:  NO  Pacemaker:    NO  Contraindications for Manipulation: None  Date of Surgery: n/a  Other:    Red Flags:  None    Suicide Screening:   The patient did not verbalize a primary behavioral concern, suicidal ideation, suicidal intent, or demonstrate suicidal behaviors.    Preferred Language for Healthcare:   [x] English       [] other:    SUBJECTIVE EXAMINATION     Patient stated complaint: Pt had pain left wrist after holding stair rail..   Pt has not been taking Meloxicam due to BP.       EVAL: Patient report pain for 2-3 weeks in the L shoulder and

## 2024-08-26 NOTE — TELEPHONE ENCOUNTER
Spoke to patient, let her know Dr. Daniels wanted to expedite her into someone to be able to discuss doing an epidural with this reoccurence of arm/wrist/hand pain. I placed the referral and gave patient the information to call. Also advised her to keep therapy appointment as they can work on pain reduction - possibly traction, etc.

## 2024-08-27 ENCOUNTER — APPOINTMENT (OUTPATIENT)
Dept: PHYSICAL THERAPY | Age: 84
End: 2024-08-27
Payer: MEDICARE

## 2024-09-16 ENCOUNTER — OFFICE VISIT (OUTPATIENT)
Dept: ORTHOPEDIC SURGERY | Age: 84
End: 2024-09-16
Payer: MEDICARE

## 2024-09-16 DIAGNOSIS — M65.9 SYNOVITIS OF LEFT WRIST: ICD-10-CM

## 2024-09-16 DIAGNOSIS — M17.0 PRIMARY OSTEOARTHRITIS OF BOTH KNEES: ICD-10-CM

## 2024-09-16 DIAGNOSIS — M22.41 CHONDROMALACIA OF BOTH PATELLAE: ICD-10-CM

## 2024-09-16 DIAGNOSIS — M19.012 LOCALIZED OSTEOARTHRITIS OF LEFT SHOULDER: ICD-10-CM

## 2024-09-16 DIAGNOSIS — M50.30 DDD (DEGENERATIVE DISC DISEASE), CERVICAL: ICD-10-CM

## 2024-09-16 DIAGNOSIS — M50.00 HNP (HERNIATED NUCLEUS PULPOSUS) WITH MYELOPATHY, CERVICAL: Primary | ICD-10-CM

## 2024-09-16 DIAGNOSIS — M22.42 CHONDROMALACIA OF BOTH PATELLAE: ICD-10-CM

## 2024-09-16 DIAGNOSIS — M54.12 RADICULITIS OF LEFT CERVICAL REGION: ICD-10-CM

## 2024-09-16 DIAGNOSIS — M25.512 LEFT SHOULDER PAIN, UNSPECIFIED CHRONICITY: ICD-10-CM

## 2024-09-16 PROCEDURE — 1123F ACP DISCUSS/DSCN MKR DOCD: CPT | Performed by: FAMILY MEDICINE

## 2024-09-16 PROCEDURE — 99213 OFFICE O/P EST LOW 20 MIN: CPT | Performed by: FAMILY MEDICINE

## 2024-09-28 DIAGNOSIS — G89.29 CHRONIC PAIN OF RIGHT KNEE: ICD-10-CM

## 2024-09-28 DIAGNOSIS — M22.41 CHONDROMALACIA OF BOTH PATELLAE: ICD-10-CM

## 2024-09-28 DIAGNOSIS — M17.0 PRIMARY OSTEOARTHRITIS OF BOTH KNEES: ICD-10-CM

## 2024-09-28 DIAGNOSIS — G89.29 CHRONIC PAIN OF LEFT KNEE: ICD-10-CM

## 2024-09-28 DIAGNOSIS — M25.562 CHRONIC PAIN OF LEFT KNEE: ICD-10-CM

## 2024-09-28 DIAGNOSIS — M22.42 CHONDROMALACIA OF BOTH PATELLAE: ICD-10-CM

## 2024-09-28 DIAGNOSIS — M25.561 CHRONIC PAIN OF RIGHT KNEE: ICD-10-CM

## 2024-10-07 RX ORDER — MELOXICAM 15 MG/1
15 TABLET ORAL DAILY
Qty: 30 TABLET | Refills: 3 | Status: SHIPPED | OUTPATIENT
Start: 2024-10-07

## 2025-02-04 DIAGNOSIS — M22.42 CHONDROMALACIA OF BOTH PATELLAE: ICD-10-CM

## 2025-02-04 DIAGNOSIS — M25.562 CHRONIC PAIN OF LEFT KNEE: ICD-10-CM

## 2025-02-04 DIAGNOSIS — G89.29 CHRONIC PAIN OF LEFT KNEE: ICD-10-CM

## 2025-02-04 DIAGNOSIS — M17.0 PRIMARY OSTEOARTHRITIS OF BOTH KNEES: ICD-10-CM

## 2025-02-04 DIAGNOSIS — M22.41 CHONDROMALACIA OF BOTH PATELLAE: ICD-10-CM

## 2025-02-04 DIAGNOSIS — G89.29 CHRONIC PAIN OF RIGHT KNEE: ICD-10-CM

## 2025-02-04 DIAGNOSIS — M25.561 CHRONIC PAIN OF RIGHT KNEE: ICD-10-CM

## 2025-02-05 RX ORDER — MELOXICAM 15 MG/1
15 TABLET ORAL DAILY
Qty: 30 TABLET | Refills: 3 | Status: SHIPPED | OUTPATIENT
Start: 2025-02-05

## 2025-03-19 ENCOUNTER — OFFICE VISIT (OUTPATIENT)
Dept: ORTHOPEDIC SURGERY | Age: 85
End: 2025-03-19
Payer: MEDICARE

## 2025-03-19 VITALS — BODY MASS INDEX: 27.38 KG/M2 | WEIGHT: 145 LBS | HEIGHT: 61 IN

## 2025-03-19 DIAGNOSIS — M17.12 ARTHRITIS OF LEFT KNEE: ICD-10-CM

## 2025-03-19 DIAGNOSIS — M25.562 LEFT KNEE PAIN, UNSPECIFIED CHRONICITY: Primary | ICD-10-CM

## 2025-03-19 PROCEDURE — 1159F MED LIST DOCD IN RCRD: CPT | Performed by: NURSE PRACTITIONER

## 2025-03-19 PROCEDURE — 99213 OFFICE O/P EST LOW 20 MIN: CPT | Performed by: NURSE PRACTITIONER

## 2025-03-19 PROCEDURE — 1160F RVW MEDS BY RX/DR IN RCRD: CPT | Performed by: NURSE PRACTITIONER

## 2025-03-19 PROCEDURE — 1123F ACP DISCUSS/DSCN MKR DOCD: CPT | Performed by: NURSE PRACTITIONER

## 2025-03-19 NOTE — PROGRESS NOTES
CHIEF COMPLAINT:    Chief Complaint   Patient presents with    Pain     L LEG       HISTORY OF PRESENT ILLNESS:                The patient is a 84 y.o. female who presents today for evaluation of left knee.  Patient states yesterday she went to get out of bed and had pain in her left knee as she points to the posterior aspect into her calf.  She tried to put a towel underneath of it is elevated and states that her leg kept jerking.  She describes the pain as a achy and throbbing discomfort.  She has been using a single-point cane to ambulate due to knee pain.  She states that she is not diabetic.  She has meloxicam but has not been taking it regularly.  She is a patient of Dr. Douglas and has received gel and cortisone injections in her left knee in the past.  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        Past Medical History: Medical history form was reviewed today & can be found in the media tab  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

## 2025-03-24 ENCOUNTER — OFFICE VISIT (OUTPATIENT)
Dept: ORTHOPEDIC SURGERY | Age: 85
End: 2025-03-24
Payer: MEDICARE

## 2025-03-24 DIAGNOSIS — G89.29 CHRONIC PAIN OF LEFT KNEE: ICD-10-CM

## 2025-03-24 DIAGNOSIS — M25.562 LEFT KNEE PAIN, UNSPECIFIED CHRONICITY: Primary | ICD-10-CM

## 2025-03-24 DIAGNOSIS — G89.29 CHRONIC PAIN OF RIGHT KNEE: ICD-10-CM

## 2025-03-24 DIAGNOSIS — M22.42 CHONDROMALACIA OF BOTH PATELLAE: ICD-10-CM

## 2025-03-24 DIAGNOSIS — M25.562 CHRONIC PAIN OF LEFT KNEE: ICD-10-CM

## 2025-03-24 DIAGNOSIS — M17.12 ARTHRITIS OF LEFT KNEE: ICD-10-CM

## 2025-03-24 DIAGNOSIS — M17.0 PRIMARY OSTEOARTHRITIS OF BOTH KNEES: ICD-10-CM

## 2025-03-24 DIAGNOSIS — M25.561 CHRONIC PAIN OF RIGHT KNEE: ICD-10-CM

## 2025-03-24 DIAGNOSIS — M22.41 CHONDROMALACIA OF BOTH PATELLAE: ICD-10-CM

## 2025-03-24 PROCEDURE — 1159F MED LIST DOCD IN RCRD: CPT | Performed by: FAMILY MEDICINE

## 2025-03-24 PROCEDURE — 1123F ACP DISCUSS/DSCN MKR DOCD: CPT | Performed by: FAMILY MEDICINE

## 2025-03-24 PROCEDURE — 99213 OFFICE O/P EST LOW 20 MIN: CPT | Performed by: FAMILY MEDICINE

## 2025-03-24 NOTE — PROGRESS NOTES
Chief Complaint    Knee Pain (CK GABY KNEES )      Evaluation recurrent worsening of now left greater than right knee multicompartment osteoarthritis status post completion of bilateral knee Euflexxa 7/29/2024 with left calf pain    History of Present Illness:  Janae Casillas is a 84 y.o. female who is a very nice patient of Dr. Taco Caruso and longstanding patient of Dr. Everette Pavon is being seen today for evaluation of recurrent right greater than left knee pain.  Once again she is known to have fairly substantial bilateral knee patellofemoral arthropathy with medial compartment osteoarthritis left greater than right but is more symptomatic on the right-hand side.  She will last received her rounds of viscosupplementation her knees bilaterally on 3/9/2020 and was doing reasonably well up until early October 2020 when she began to notice a gradual onset of pain to the anterior medial portion of her knees.  There is no history of actual injury or new activity prior to becoming symptomatic.  She does complain of an achy pain which is fairly minor at rest at 1-2 out of 10 but when she is up and walking particular doing lots of stairs and distance walking or working out in the yard, her pain can be quite prominent at 5-6 out of 10.  She feels as if her Euflexxa is beginning to wear out.  She has been a little bit lax in performing her home-based exercise program and was able to wean off of her meloxicam when the Euflexxa was working properly for her.  Denies true locking catching or instability symptoms.  She is being seen today for orthopedic and sports consultation with updated imaging.      We last saw Bri in the office on 2/9/2022 when we finished up her Orthovisc injections to her knees bilaterally for underlying significant bilateral knee osteoarthritis with patellofemoral arthropathy.  She is done very well and just in the last few days she has had some recurrent achiness to the anterior and medial portion of

## 2025-03-28 ENCOUNTER — TELEPHONE (OUTPATIENT)
Dept: ORTHOPEDIC SURGERY | Age: 85
End: 2025-03-28

## 2025-03-28 NOTE — TELEPHONE ENCOUNTER
LVM for patient that euflexxa injections have been approved for bilaterally knee. Told patient they could call central scheduling at [phone] at their convenience to schedule those appointments. Also told them if they had any problems or questions, they could call me directly at [phone]

## 2025-04-02 ENCOUNTER — OFFICE VISIT (OUTPATIENT)
Dept: ORTHOPEDIC SURGERY | Age: 85
End: 2025-04-02
Payer: MEDICARE

## 2025-04-02 DIAGNOSIS — G89.29 CHRONIC PAIN OF RIGHT KNEE: ICD-10-CM

## 2025-04-02 DIAGNOSIS — M25.561 CHRONIC PAIN OF RIGHT KNEE: ICD-10-CM

## 2025-04-02 DIAGNOSIS — M17.0 PRIMARY OSTEOARTHRITIS OF BOTH KNEES: ICD-10-CM

## 2025-04-02 DIAGNOSIS — G89.29 CHRONIC PAIN OF LEFT KNEE: ICD-10-CM

## 2025-04-02 DIAGNOSIS — M22.42 CHONDROMALACIA OF BOTH PATELLAE: ICD-10-CM

## 2025-04-02 DIAGNOSIS — M22.41 CHONDROMALACIA OF BOTH PATELLAE: ICD-10-CM

## 2025-04-02 DIAGNOSIS — M25.562 CHRONIC PAIN OF LEFT KNEE: ICD-10-CM

## 2025-04-02 DIAGNOSIS — M25.562 LEFT KNEE PAIN, UNSPECIFIED CHRONICITY: Primary | ICD-10-CM

## 2025-04-02 PROCEDURE — 20610 DRAIN/INJ JOINT/BURSA W/O US: CPT | Performed by: FAMILY MEDICINE

## 2025-04-02 PROCEDURE — 1123F ACP DISCUSS/DSCN MKR DOCD: CPT | Performed by: FAMILY MEDICINE

## 2025-04-02 PROCEDURE — 99213 OFFICE O/P EST LOW 20 MIN: CPT | Performed by: FAMILY MEDICINE

## 2025-04-02 NOTE — PROGRESS NOTES
Chief Complaint    Knee Pain (FU GABY KNEES )      Evaluation recurrent worsening of now left greater than right knee multicompartment osteoarthritis status post completion of bilateral knee Euflexxa 7/29/2024 with left calf pain    History of Present Illness:  Janae Casillas is a 84 y.o. female who is a very nice patient of Dr. Taco Caruso and longstanding patient of Dr. Everette Pavon is being seen today for evaluation of recurrent right greater than left knee pain.  Once again she is known to have fairly substantial bilateral knee patellofemoral arthropathy with medial compartment osteoarthritis left greater than right but is more symptomatic on the right-hand side.  She will last received her rounds of viscosupplementation her knees bilaterally on 3/9/2020 and was doing reasonably well up until early October 2020 when she began to notice a gradual onset of pain to the anterior medial portion of her knees.  There is no history of actual injury or new activity prior to becoming symptomatic.  She does complain of an achy pain which is fairly minor at rest at 1-2 out of 10 but when she is up and walking particular doing lots of stairs and distance walking or working out in the yard, her pain can be quite prominent at 5-6 out of 10.  She feels as if her Euflexxa is beginning to wear out.  She has been a little bit lax in performing her home-based exercise program and was able to wean off of her meloxicam when the Euflexxa was working properly for her.  Denies true locking catching or instability symptoms.  She is being seen today for orthopedic and sports consultation with updated imaging.      We last saw Bri in the office on 2/9/2022 when we finished up her Orthovisc injections to her knees bilaterally for underlying significant bilateral knee osteoarthritis with patellofemoral arthropathy.  She is done very well and just in the last few days she has had some recurrent achiness to the anterior and medial portion of

## 2025-04-14 ENCOUNTER — OFFICE VISIT (OUTPATIENT)
Dept: ORTHOPEDIC SURGERY | Age: 85
End: 2025-04-14
Payer: MEDICARE

## 2025-04-14 DIAGNOSIS — M22.42 CHONDROMALACIA OF BOTH PATELLAE: ICD-10-CM

## 2025-04-14 DIAGNOSIS — G89.29 CHRONIC PAIN OF RIGHT KNEE: ICD-10-CM

## 2025-04-14 DIAGNOSIS — M25.561 CHRONIC PAIN OF RIGHT KNEE: ICD-10-CM

## 2025-04-14 DIAGNOSIS — M25.562 LEFT KNEE PAIN, UNSPECIFIED CHRONICITY: Primary | ICD-10-CM

## 2025-04-14 DIAGNOSIS — G89.29 CHRONIC PAIN OF LEFT KNEE: ICD-10-CM

## 2025-04-14 DIAGNOSIS — M25.562 CHRONIC PAIN OF LEFT KNEE: ICD-10-CM

## 2025-04-14 DIAGNOSIS — M17.0 PRIMARY OSTEOARTHRITIS OF BOTH KNEES: ICD-10-CM

## 2025-04-14 DIAGNOSIS — M22.41 CHONDROMALACIA OF BOTH PATELLAE: ICD-10-CM

## 2025-04-14 PROCEDURE — 20610 DRAIN/INJ JOINT/BURSA W/O US: CPT | Performed by: FAMILY MEDICINE

## 2025-04-14 NOTE — PROGRESS NOTES
he finished up viscosupplementation with Orthovisc o her knees bilaterally.  She has become a little more sore recently.  There is no history of injury or fall and describes her pain symptoms to her knees is more mild in the range of 3-4 out of 10 associate with positional changes distance walking and stair climbing.  She has been inconsistent with her Voltaren gel and was not able to tolerate the meloxicam due to GI upset.  She is try to keep up with her exercises denies locking catching or true instability symptoms.  She has also noticed since early October 2023 episodic discomfort to the anterior lateral aspect of her right hip without history of fall or trauma.    We last saw Bri in the office on 12/11/2023 for recurrence of her bilateral knee osteoarthritis.  We did start her on Orthovisc and is here today for second injection stating she is doing much better.  The intensity the pain is much less and she is trying to be better about doing her exercise program.  She is been utilizing her Voltaren gel episodically as she was not able to tolerate meloxicam due to GI upset is continue with her bracing exercise program.  The intensity of pain is markedly improved and she is here today for second injection of Orthovisc.  She is hopeful to put off surgery as long as she can.  She had been having some lateral hip discomfort but was able to walk around the [facility] center over the weekend without tremendous difficulty work on her home-based exercises.    We last saw Bri in the office on early January 2024 when she finished up viscosupplementation using Orthovisc to her knees.  She did have her final injection with Enrique Brewster in my absence and has done reasonably well.  She has become a little more sore recently.  She has been using her Voltaren gel and her interim history is markable for treatment with Enrique Brewster for a nondisplaced left lateral malleolus fracture which is doing reasonably well.  She has little

## 2025-04-21 ENCOUNTER — OFFICE VISIT (OUTPATIENT)
Dept: ORTHOPEDIC SURGERY | Age: 85
End: 2025-04-21
Payer: MEDICARE

## 2025-04-21 VITALS — WEIGHT: 145 LBS | BODY MASS INDEX: 27.38 KG/M2 | HEIGHT: 61 IN

## 2025-04-21 DIAGNOSIS — M25.561 CHRONIC PAIN OF RIGHT KNEE: ICD-10-CM

## 2025-04-21 DIAGNOSIS — G89.29 CHRONIC PAIN OF RIGHT KNEE: ICD-10-CM

## 2025-04-21 DIAGNOSIS — M22.42 CHONDROMALACIA OF BOTH PATELLAE: ICD-10-CM

## 2025-04-21 DIAGNOSIS — M22.41 CHONDROMALACIA OF BOTH PATELLAE: ICD-10-CM

## 2025-04-21 DIAGNOSIS — M25.562 LEFT KNEE PAIN, UNSPECIFIED CHRONICITY: ICD-10-CM

## 2025-04-21 DIAGNOSIS — G89.29 CHRONIC PAIN OF LEFT KNEE: ICD-10-CM

## 2025-04-21 DIAGNOSIS — M17.0 PRIMARY OSTEOARTHRITIS OF BOTH KNEES: Primary | ICD-10-CM

## 2025-04-21 DIAGNOSIS — M25.562 CHRONIC PAIN OF LEFT KNEE: ICD-10-CM

## 2025-04-21 PROCEDURE — 20610 DRAIN/INJ JOINT/BURSA W/O US: CPT | Performed by: FAMILY MEDICINE

## 2025-06-24 ENCOUNTER — HOSPITAL ENCOUNTER (INPATIENT)
Age: 85
LOS: 1 days | Discharge: HOME | DRG: 123 | End: 2025-06-25
Attending: EMERGENCY MEDICINE | Admitting: INTERNAL MEDICINE
Payer: MEDICARE

## 2025-06-24 ENCOUNTER — APPOINTMENT (OUTPATIENT)
Dept: GENERAL RADIOLOGY | Age: 85
DRG: 123 | End: 2025-06-24
Payer: MEDICARE

## 2025-06-24 ENCOUNTER — APPOINTMENT (OUTPATIENT)
Dept: CT IMAGING | Age: 85
DRG: 123 | End: 2025-06-24
Payer: MEDICARE

## 2025-06-24 DIAGNOSIS — R79.89 ELEVATED TROPONIN: ICD-10-CM

## 2025-06-24 DIAGNOSIS — H53.2 DIPLOPIA: Primary | ICD-10-CM
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
## 2025-06-24 LAB
ABO/RH: NORMAL
ALBUMIN SERPL-MCNC: 4.1 G/DL (ref 3.4–5)
ALBUMIN/GLOB SERPL: 1.4 {RATIO} (ref 1.1–2.2)
ALP SERPL-CCNC: 77 U/L (ref 40–129)
ALT SERPL-CCNC: 21 U/L (ref 10–40)
ANION GAP SERPL CALCULATED.3IONS-SCNC: 14 MMOL/L (ref 3–16)
ANTIBODY SCREEN: NORMAL
AST SERPL-CCNC: 22 U/L (ref 15–37)
BASOPHILS # BLD: 0.1 K/UL (ref 0–0.2)
BASOPHILS NFR BLD: 0.7 %
BILIRUB SERPL-MCNC: 0.3 MG/DL (ref 0–1)
BILIRUB UR QL STRIP.AUTO: NEGATIVE
BUN SERPL-MCNC: 27 MG/DL (ref 7–20)
CALCIUM SERPL-MCNC: 9.4 MG/DL (ref 8.3–10.6)
CHLORIDE SERPL-SCNC: 99 MMOL/L (ref 99–110)
CLARITY UR: CLEAR
CO2 SERPL-SCNC: 24 MMOL/L (ref 21–32)
COLOR UR: YELLOW
CREAT SERPL-MCNC: 1.2 MG/DL (ref 0.6–1.2)
DEPRECATED RDW RBC AUTO: 15.9 % (ref 12.4–15.4)
EKG ATRIAL RATE: 67 BPM
EKG DIAGNOSIS: NORMAL
EKG P AXIS: 108 DEGREES
EKG P-R INTERVAL: 174 MS
EKG Q-T INTERVAL: 410 MS
EKG QRS DURATION: 86 MS
EKG QTC CALCULATION (BAZETT): 433 MS
EKG R AXIS: 70 DEGREES
EKG T AXIS: -44 DEGREES
EKG VENTRICULAR RATE: 67 BPM
EOSINOPHIL # BLD: 0.1 K/UL (ref 0–0.6)
EOSINOPHIL NFR BLD: 0.9 %
EPI CELLS #/AREA URNS HPF: ABNORMAL /HPF (ref 0–5)
GFR SERPLBLD CREATININE-BSD FMLA CKD-EPI: 45 ML/MIN/{1.73_M2}
GLUCOSE BLD-MCNC: 100 MG/DL (ref 70–99)
GLUCOSE SERPL-MCNC: 97 MG/DL (ref 70–99)
GLUCOSE UR STRIP.AUTO-MCNC: NEGATIVE MG/DL
HCT VFR BLD AUTO: 40.2 % (ref 36–48)
HGB BLD-MCNC: 13.8 G/DL (ref 12–16)
HGB UR QL STRIP.AUTO: ABNORMAL
INR PPP: 1.09 (ref 0.86–1.14)
KETONES UR STRIP.AUTO-MCNC: NEGATIVE MG/DL
LEUKOCYTE ESTERASE UR QL STRIP.AUTO: ABNORMAL
LYMPHOCYTES # BLD: 2.3 K/UL (ref 1–5.1)
LYMPHOCYTES NFR BLD: 22.3 %
MCH RBC QN AUTO: 30.5 PG (ref 26–34)
MCHC RBC AUTO-ENTMCNC: 34.3 G/DL (ref 31–36)
MCV RBC AUTO: 88.9 FL (ref 80–100)
MONOCYTES # BLD: 1.4 K/UL (ref 0–1.3)
MONOCYTES NFR BLD: 13.4 %
NEUTROPHILS # BLD: 6.6 K/UL (ref 1.7–7.7)
NEUTROPHILS NFR BLD: 62.7 %
NITRITE UR QL STRIP.AUTO: NEGATIVE
PERFORMED ON: ABNORMAL
PH UR STRIP.AUTO: 6 [PH] (ref 5–8)
PLATELET # BLD AUTO: 227 K/UL (ref 135–450)
PMV BLD AUTO: 7.4 FL (ref 5–10.5)
POTASSIUM SERPL-SCNC: 4.2 MMOL/L (ref 3.5–5.1)
PROT SERPL-MCNC: 7.1 G/DL (ref 6.4–8.2)
PROT UR STRIP.AUTO-MCNC: NEGATIVE MG/DL
PROTHROMBIN TIME: 14.3 SEC (ref 12.1–14.9)
RBC # BLD AUTO: 4.52 M/UL (ref 4–5.2)
RBC #/AREA URNS HPF: ABNORMAL /HPF (ref 0–4)
SODIUM SERPL-SCNC: 137 MMOL/L (ref 136–145)
SP GR UR STRIP.AUTO: <=1.005 (ref 1–1.03)
TROPONIN, HIGH SENSITIVITY: 21 NG/L (ref 0–14)
TROPONIN, HIGH SENSITIVITY: 23 NG/L (ref 0–14)
TSH SERPL DL<=0.005 MIU/L-ACNC: 1.47 UIU/ML (ref 0.27–4.2)
UA COMPLETE W REFLEX CULTURE PNL UR: YES
UA DIPSTICK W REFLEX MICRO PNL UR: YES
URN SPEC COLLECT METH UR: ABNORMAL
UROBILINOGEN UR STRIP-ACNC: 0.2 E.U./DL
WBC # BLD AUTO: 10.5 K/UL (ref 4–11)
WBC #/AREA URNS HPF: ABNORMAL /HPF (ref 0–5)

## 2025-06-24 PROCEDURE — 85025 COMPLETE CBC W/AUTO DIFF WBC: CPT

## 2025-06-24 PROCEDURE — 36415 COLL VENOUS BLD VENIPUNCTURE: CPT

## 2025-06-24 PROCEDURE — 86850 RBC ANTIBODY SCREEN: CPT

## 2025-06-24 PROCEDURE — 70450 CT HEAD/BRAIN W/O DYE: CPT

## 2025-06-24 PROCEDURE — 93005 ELECTROCARDIOGRAM TRACING: CPT | Performed by: EMERGENCY MEDICINE

## 2025-06-24 PROCEDURE — 87186 SC STD MICRODIL/AGAR DIL: CPT

## 2025-06-24 PROCEDURE — 84484 ASSAY OF TROPONIN QUANT: CPT

## 2025-06-24 PROCEDURE — 86900 BLOOD TYPING SEROLOGIC ABO: CPT

## 2025-06-24 PROCEDURE — G0378 HOSPITAL OBSERVATION PER HR: HCPCS

## 2025-06-24 PROCEDURE — 94640 AIRWAY INHALATION TREATMENT: CPT

## 2025-06-24 PROCEDURE — 6360000004 HC RX CONTRAST MEDICATION: Performed by: EMERGENCY MEDICINE

## 2025-06-24 PROCEDURE — 87086 URINE CULTURE/COLONY COUNT: CPT

## 2025-06-24 PROCEDURE — 80053 COMPREHEN METABOLIC PANEL: CPT

## 2025-06-24 PROCEDURE — 81001 URINALYSIS AUTO W/SCOPE: CPT

## 2025-06-24 PROCEDURE — 93010 ELECTROCARDIOGRAM REPORT: CPT | Performed by: INTERNAL MEDICINE

## 2025-06-24 PROCEDURE — 1200000000 HC SEMI PRIVATE

## 2025-06-24 PROCEDURE — 87077 CULTURE AEROBIC IDENTIFY: CPT

## 2025-06-24 PROCEDURE — 96372 THER/PROPH/DIAG INJ SC/IM: CPT

## 2025-06-24 PROCEDURE — APPSS45 APP SPLIT SHARED TIME 31-45 MINUTES

## 2025-06-24 PROCEDURE — 84443 ASSAY THYROID STIM HORMONE: CPT

## 2025-06-24 PROCEDURE — 6370000000 HC RX 637 (ALT 250 FOR IP): Performed by: NURSE PRACTITIONER

## 2025-06-24 PROCEDURE — 86901 BLOOD TYPING SEROLOGIC RH(D): CPT

## 2025-06-24 PROCEDURE — 85610 PROTHROMBIN TIME: CPT

## 2025-06-24 PROCEDURE — 6370000000 HC RX 637 (ALT 250 FOR IP)

## 2025-06-24 PROCEDURE — 70496 CT ANGIOGRAPHY HEAD: CPT

## 2025-06-24 PROCEDURE — 99285 EMERGENCY DEPT VISIT HI MDM: CPT

## 2025-06-24 PROCEDURE — 71045 X-RAY EXAM CHEST 1 VIEW: CPT

## 2025-06-24 PROCEDURE — 6370000000 HC RX 637 (ALT 250 FOR IP): Performed by: EMERGENCY MEDICINE

## 2025-06-24 PROCEDURE — 2500000003 HC RX 250 WO HCPCS

## 2025-06-24 PROCEDURE — 6360000002 HC RX W HCPCS

## 2025-06-24 RX ORDER — NITROFURANTOIN MACROCRYSTALS 50 MG/1
50 CAPSULE ORAL 4 TIMES DAILY
COMMUNITY

## 2025-06-24 RX ORDER — BUDESONIDE AND FORMOTEROL FUMARATE DIHYDRATE 160; 4.5 UG/1; UG/1
2 AEROSOL RESPIRATORY (INHALATION)
Status: DISCONTINUED | OUTPATIENT
Start: 2025-06-24 | End: 2025-06-25 | Stop reason: HOSPADM

## 2025-06-24 RX ORDER — NITROFURANTOIN 25; 75 MG/1; MG/1
100 CAPSULE ORAL DAILY
Status: DISCONTINUED | OUTPATIENT
Start: 2025-06-24 | End: 2025-06-25 | Stop reason: HOSPADM

## 2025-06-24 RX ORDER — SODIUM CHLORIDE 0.9 % (FLUSH) 0.9 %
5-40 SYRINGE (ML) INJECTION EVERY 12 HOURS SCHEDULED
Status: DISCONTINUED | OUTPATIENT
Start: 2025-06-24 | End: 2025-06-25 | Stop reason: HOSPADM

## 2025-06-24 RX ORDER — ENOXAPARIN SODIUM 100 MG/ML
40 INJECTION SUBCUTANEOUS DAILY
Status: DISCONTINUED | OUTPATIENT
Start: 2025-06-24 | End: 2025-06-25 | Stop reason: HOSPADM

## 2025-06-24 RX ORDER — SODIUM CHLORIDE 9 MG/ML
INJECTION, SOLUTION INTRAVENOUS PRN
Status: DISCONTINUED | OUTPATIENT
Start: 2025-06-24 | End: 2025-06-25 | Stop reason: HOSPADM

## 2025-06-24 RX ORDER — ONDANSETRON 2 MG/ML
4 INJECTION INTRAMUSCULAR; INTRAVENOUS EVERY 6 HOURS PRN
Status: DISCONTINUED | OUTPATIENT
Start: 2025-06-24 | End: 2025-06-25 | Stop reason: HOSPADM

## 2025-06-24 RX ORDER — FLUTICASONE PROPIONATE 50 MCG
2 SPRAY, SUSPENSION (ML) NASAL DAILY
Status: DISCONTINUED | OUTPATIENT
Start: 2025-06-24 | End: 2025-06-25 | Stop reason: HOSPADM

## 2025-06-24 RX ORDER — POLYETHYLENE GLYCOL 3350 17 G/17G
17 POWDER, FOR SOLUTION ORAL DAILY PRN
Status: DISCONTINUED | OUTPATIENT
Start: 2025-06-24 | End: 2025-06-25 | Stop reason: HOSPADM

## 2025-06-24 RX ORDER — VALSARTAN 80 MG/1
320 TABLET ORAL DAILY
Status: DISCONTINUED | OUTPATIENT
Start: 2025-06-24 | End: 2025-06-24

## 2025-06-24 RX ORDER — ASPIRIN 81 MG/1
324 TABLET, CHEWABLE ORAL ONCE
Status: COMPLETED | OUTPATIENT
Start: 2025-06-24 | End: 2025-06-24

## 2025-06-24 RX ORDER — ATORVASTATIN CALCIUM 80 MG/1
80 TABLET, FILM COATED ORAL NIGHTLY
Status: DISCONTINUED | OUTPATIENT
Start: 2025-06-24 | End: 2025-06-25

## 2025-06-24 RX ORDER — ESTRADIOL 0.1 MG/G
0.1 CREAM VAGINAL DAILY
COMMUNITY
Start: 2025-03-11

## 2025-06-24 RX ORDER — ONDANSETRON 4 MG/1
4 TABLET, ORALLY DISINTEGRATING ORAL EVERY 8 HOURS PRN
Status: DISCONTINUED | OUTPATIENT
Start: 2025-06-24 | End: 2025-06-25 | Stop reason: HOSPADM

## 2025-06-24 RX ORDER — VALSARTAN 80 MG/1
160 TABLET ORAL NIGHTLY
Status: DISCONTINUED | OUTPATIENT
Start: 2025-06-24 | End: 2025-06-25 | Stop reason: HOSPADM

## 2025-06-24 RX ORDER — SODIUM CHLORIDE 0.9 % (FLUSH) 0.9 %
5-40 SYRINGE (ML) INJECTION PRN
Status: DISCONTINUED | OUTPATIENT
Start: 2025-06-24 | End: 2025-06-25 | Stop reason: HOSPADM

## 2025-06-24 RX ORDER — ASPIRIN 300 MG/1
300 SUPPOSITORY RECTAL DAILY
Status: DISCONTINUED | OUTPATIENT
Start: 2025-06-25 | End: 2025-06-25 | Stop reason: HOSPADM

## 2025-06-24 RX ORDER — ASPIRIN 81 MG/1
81 TABLET, CHEWABLE ORAL DAILY
Status: DISCONTINUED | OUTPATIENT
Start: 2025-06-25 | End: 2025-06-25 | Stop reason: HOSPADM

## 2025-06-24 RX ORDER — CETIRIZINE HYDROCHLORIDE 10 MG/1
5 TABLET ORAL DAILY
Status: DISCONTINUED | OUTPATIENT
Start: 2025-06-24 | End: 2025-06-25 | Stop reason: HOSPADM

## 2025-06-24 RX ORDER — VALSARTAN 80 MG/1
160 TABLET ORAL DAILY
Status: DISCONTINUED | OUTPATIENT
Start: 2025-06-24 | End: 2025-06-24

## 2025-06-24 RX ORDER — IOPAMIDOL 755 MG/ML
75 INJECTION, SOLUTION INTRAVASCULAR
Status: COMPLETED | OUTPATIENT
Start: 2025-06-24 | End: 2025-06-24

## 2025-06-24 RX ORDER — ALBUTEROL SULFATE 90 UG/1
2 INHALANT RESPIRATORY (INHALATION) EVERY 6 HOURS PRN
Status: DISCONTINUED | OUTPATIENT
Start: 2025-06-24 | End: 2025-06-25 | Stop reason: HOSPADM

## 2025-06-24 RX ORDER — VALSARTAN 320 MG/1
320 TABLET ORAL DAILY
Status: ON HOLD | COMMUNITY
End: 2025-06-25 | Stop reason: HOSPADM

## 2025-06-24 RX ADMIN — ENOXAPARIN SODIUM 40 MG: 100 INJECTION SUBCUTANEOUS at 14:16

## 2025-06-24 RX ADMIN — Medication 2 PUFF: at 20:01

## 2025-06-24 RX ADMIN — IOPAMIDOL 75 ML: 755 INJECTION, SOLUTION INTRAVENOUS at 09:49

## 2025-06-24 RX ADMIN — ASPIRIN 324 MG: 81 TABLET, CHEWABLE ORAL at 11:11

## 2025-06-24 RX ADMIN — Medication 10 ML: at 20:32

## 2025-06-24 RX ADMIN — VALSARTAN 160 MG: 80 TABLET, FILM COATED ORAL at 20:31

## 2025-06-24 RX ADMIN — CETIRIZINE HYDROCHLORIDE 5 MG: 10 TABLET, FILM COATED ORAL at 14:16

## 2025-06-24 ASSESSMENT — LIFESTYLE VARIABLES
HOW MANY STANDARD DRINKS CONTAINING ALCOHOL DO YOU HAVE ON A TYPICAL DAY: PATIENT DOES NOT DRINK
HOW OFTEN DO YOU HAVE A DRINK CONTAINING ALCOHOL: NEVER

## 2025-06-24 ASSESSMENT — PAIN - FUNCTIONAL ASSESSMENT: PAIN_FUNCTIONAL_ASSESSMENT: NONE - DENIES PAIN

## 2025-06-24 ASSESSMENT — PAIN SCALES - GENERAL: PAINLEVEL_OUTOF10: 0

## 2025-06-24 NOTE — CONSULTS
Janae Casillas  Inpatient Neurology Consult  [facility]            Janae Sonja  1940    Date of Service: 6/24/2025    Referring Physician: Isidra Bojorquez MD      Reason for the consult and CC: Diplopia     HPI:   The patient is a 84 y.o.  years old female with a prior medical history of hepatitis C, hypertension, IIH who presented to the hospital today with acute diplopia.  Patient reports that yesterday evening she was watching TV when she noticed that she was having diplopia.  She had been sick over the weekend so she thought maybe she was just dehydrated or tired so she went to bed.  This morning when she woke up, she was still having diplopia.  She says that yesterday evening she was wearing an old eyeglasses prescription however she wears these every night when she watches TV.  She is unsure which class that she was wearing this morning.  Currently, she says that her vision is normal when she is wearing her glasses however when she takes them off she has vertical diplopia, especially in the right field of gaze. She does not typically have diplopia when she is not wearing her glasses. She said that she had this symptom once before years ago and that she went to the eye doctor and they changed her prescription and it resolved.  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 At the time, she was having headaches and diplopia. She last saw  neurology in 2017.  This admission, CT head, CTA head/neck revealed no acute findings.       Constitutional:   Vitals:    06/24/25 1024 06/24/25 1111 06/24/25 1220 06/24/25 1355   BP: 133/69 120/62 124/75 133/71   Pulse: 66 67 66 61   Resp: 10 18 20 20   Temp:       TempSrc:       SpO2: 96% 98% 98%    Weight:       Height:             I personally reviewed and updated social history, past medical history, medications, allergy,

## 2025-06-24 NOTE — ED NOTES
CODE STROKE  @0937  called [facility]  @0937 Called CT  @0937 Called  Stroke Team  @0937 Called lab  @0939  called back from  Stroke Team and spoke to

## 2025-06-24 NOTE — PROGRESS NOTES
Patient cochlear implant paperwork was located and retrieved the paperwork and gave back to patient.

## 2025-06-24 NOTE — ED PROVIDER NOTES
EMERGENCY DEPARTMENT ENCOUNTER     [facility] EMERGENCY DEPARTMENT     Pt Name: Janae Casillas   MRN: [id number]   Birthdate 1940   Date of evaluation: 6/24/2025   Provider: Gio Rankin MD   PCP: Taco Caruso MD   Note Started: 1:53 PM EDT 6/24/25     CHIEF COMPLAINT     Chief Complaint   Patient presents with    Fall     Patient complains of double vision that started last night at 1900.  Had diarrhea a few days ago that has resolved.  Was lightheaded today at 0100, slid into a wall and onto the ground.  Denies any injuries, denies any loss of consciousness.          HISTORY OF PRESENT ILLNESS:  History from : Patient   Limitations to history : None     Janae Casillas is a 84 y.o. female who presents for fall.  Patient reports that she had double vision last night starting around 7 PM while watching television.  She has had diarrhea for the last few days but yesterday had only 1 episode and none today.  Patient's diplopia continued until today.  It is essentially unchanged.  No prior history of this.  Overnight the patient did have a fall that she states it was related to getting out of bed in an unusual way because she did not want to disturb the cat that sleeps in her bed as well.  She did not strike her head or lose consciousness.  No other complaints.    Nursing Notes were all reviewed and agreed with or any disagreements were addressed in the HPI.     ROS: Positives and Pertinent negatives as per HPI.    PAST MEDICAL HISTORY     Past medical history:  has a past medical history of Asthma, Ear discomfort, Hepatitis C, Hypertension, Liver disease, Lung disorder, Macular degeneration, Osteoarthritis, and Skull anomaly.    Past surgical history:  has a past surgical history that includes Appendectomy; Lung surgery; Appendectomy; Cochlear implant (2012); Cardiac catheterization (2004); Hysterectomy (1994); shoulder surgery; shoulder surgery (Left); Colonoscopy (11/16/2017); eye surgery;  administration in time range)   atorvastatin (LIPITOR) tablet 80 mg (has no administration in time range)   aspirin chewable tablet 81 mg (has no administration in time range)     Or   aspirin suppository 300 mg (has no administration in time range)   albuterol sulfate HFA (PROVENTIL;VENTOLIN;PROAIR) 108 (90 Base) MCG/ACT inhaler 2 puff (has no administration in time range)   fluticasone (FLONASE) 50 MCG/ACT nasal spray 2 spray (has no administration in time range)   cetirizine (ZYRTEC) tablet 5 mg (has no administration in time range)   budesonide-formoterol (SYMBICORT) 160-4.5 MCG/ACT inhaler 2 puff (2 puffs Inhalation Not Given 6/24/25 1250)   valsartan (DIOVAN) tablet 320 mg ( Oral Automatically Held 6/27/25 0900)   nitrofurantoin (macrocrystal-monohydrate) (MACROBID) capsule 100 mg ( Oral Automatically Held 6/27/25 0900)   iopamidol (ISOVUE-370) 76 % injection 75 mL (75 mLs IntraVENous Given 6/24/25 0949)   aspirin chewable tablet 324 mg (324 mg Oral Given 6/24/25 1111)        NIH Stroke Scale  Interval: Hand-off/Transfer  Level of Consciousness (1a): Alert  LOC Questions (1b): Answers both correctly  LOC Commands (1c): Performs both tasks correctly  Best Gaze (2): Normal  Visual (3): No visual loss  Facial Palsy (4): Normal symmetrical movement  Motor Arm, Left (5a): No drift  Motor Arm, Right (5b): No drift  Motor Leg, Left (6a): No drift  Motor Leg, Right (6b): No drift  Limb Ataxia (7): Absent  Sensory (8): Normal  Best Language (9): No aphasia  Dysarthria (10): Normal  Extinction and Inattention (11): No abnormality  Total: 0Glasgow Coma Scale  Eye Opening: Spontaneous  Best Verbal Response: Oriented  Best Motor Response: Obeys commands  Carmella Coma Scale Score: 15        CC/HPI Summary, DDx, ED Course, and Reassessment: On arrival the concern for the diplopia of less than 24 hours duration was that this might be a stroke syndrome.  Discussed the case with the on-call stroke physician who agreed with not

## 2025-06-24 NOTE — H&P
[facility] Medicine History & Physical    V 5.1    Date of Admission: 6/24/2025    Date of Service:  Pt seen/examined on 6/24/25     [x]Admitted to Inpatient with expected LOS greater than two midnights due to medical therapy.  []Placed in Observation status.    Chief Admission Complaint:  double vision    Presenting Admission History:  84 y.o. female with past medical history significant for cochlear implant (2012), hypertension, IIH, fall, chronic UTI, hep C, CSF leak, left pneumonectomy.  Presented to [facility] with diplopia that started around 1900 on 6/23.  The double vision is present when both eyes are open, but not when she covers one eye.  Also reports that it is not present when she has her glasses on.  This is associated with headache, dizziness, and slight confusion.  Had a stomach bug over the weekend, but denies nausea since Sunday.  Denies association with speech disturbances, paresthesias.  She also reports falling out of bed last night.  There are no aggravating or alleviating symptoms.  No radiation.      Assessment/Plan:      Diplopia.  Possible etiologies include CVA, TIA, migraine, IIH.  CT head: no acute intracranial hemorrhage or mass effect.  CTA head/neck: no acute vascular abnormality identified in the head or neck; mild atherosclerotic disease in R common carotid.  MRI brain w and w/o, and MRV w and w/o ordered 6/24.  Stroke team was contacted by ED, TNK not recommended.  Continue ASA, statin (new this admission).  PT/OT/SLP were consulted.  Neuro has been consulted, appreciate recs.  Stroke order set in place.  Lipid panel, A1c have been ordered.  Most recent NIH = 0.  DVT prophylaxis: lovenox     UTI, present prior to arrival.  Was started on nitrofurantoin 50mg daily last week.  UA on admission, bland.      Essential hypertension.  Resumed valsartan at 160mg (home dose is 320mg).  Goal BP <130/80.  Monitor and increased dose as needed/able.      Troponin

## 2025-06-24 NOTE — ED NOTES
Janae Casillas is a 84 y.o. female admitted for  Principal Problem:    Diplopia  Resolved Problems:    * No resolved hospital problems. *  .   Patient Home via self with   Chief Complaint   Patient presents with    Fall     Patient complains of double vision that started last night at 1900.  Had diarrhea a few days ago that has resolved.  Was lightheaded today at 0100, slid into a wall and onto the ground.  Denies any injuries, denies any loss of consciousness.     .  Patient is alert and Person, Place, Time, and Situation  Patient's baseline mobility: Baseline Mobility: Independent   Code Status: Full Code   Cardiac Rhythm: Cardiac Rhythm: Sinus rhythm     Is patient on baseline Oxygen: no how many Litersna:   Abnormal Assessment Findings: na    Isolation: None      NIH Score:    C-SSRS: Risk of Suicide: No Risk  Bedside swallow:        Active LDA's:   Peripheral IV 06/24/25 Right Antecubital (Active)   Site Assessment Clean, dry & intact 06/24/25 1305   Line Status Capped 06/24/25 1305   Phlebitis Assessment No symptoms 06/24/25 1305   Infiltration Assessment 0 06/24/25 1305   Alcohol Cap Used No 06/24/25 1305   Dressing Status Clean, dry & intact 06/24/25 1305   Dressing Type Transparent 06/24/25 1305   Dressing Intervention New 06/24/25 1305              Family/Caregiver Present no Any Concerns: no   Restraints no  Sitter no         Vitals: MEWS Score: 0    Vitals:    06/24/25 1220 06/24/25 1355 06/24/25 1426 06/24/25 1429   BP: 124/75 133/71 (!) 141/87    Pulse: 66 61 69 67   Resp: 20 20 23    Temp:       TempSrc:       SpO2: 98%  95%    Weight:       Height:           Last documented pain score (0-10 scale) Pain Level: 0  Pain medication administered na.    Pertinent or High Risk Medications/Drips: No.    Pending Blood Product Administration: no    Abnormal labs:   Abnormal Labs Reviewed   CBC WITH AUTO DIFFERENTIAL - Abnormal; Notable for the following components:       Result Value    RDW 15.9 (*)

## 2025-06-24 NOTE — PROGRESS NOTES
Received Emergency Dept page for admission.  Sent to NP Jesica Jaime admitting/consulting hospitalist. Current listed PCP is Taco Caruso MD.

## 2025-06-24 NOTE — ED NOTES
Per chart review, the following information was obtained regarding patient's cochlear implant:    Mary Jo Cooney MA - 09/25/2017 3:00 PM EDT  Formatting of this note may be different from the original.  Hearing Aid Chart Note:    E-mail Address: [email or web address]  Audiologist: [email or web address]  Fitting Date: 09/25/17      Aid Make and Model # :    Left: BAHA 5  Serial #: [id number]  Left [place] Serial #:    Warranty Expiration Date: Repair: 09/25/19  Warranty Expiration date: L&D: 09/25/19    Battery Size: 312  Accessories: Mini Adam (serial number [id number])  Accessories Warranty Date: 09/25/18    Chart Notes:    Fit new device and mini adam  F/u prn

## 2025-06-24 NOTE — CARE COORDINATION
Case Management Assessment  Initial Evaluation    Date/Time of Evaluation: 6/24/2025 11:57 AM  Assessment Completed by: KIRSTEN Paul    If patient is discharged prior to next notation, then this note serves as note for discharge by case management.    Patient Name: Janae Robles                   YOB: 1940  Diagnosis: Diplopia [H53.2]                   Date / Time: 6/24/2025  9:19 AM    Patient Admission Status: Inpatient   Readmission Risk (Low < 19, Mod (19-27), High > 27): No data recorded  Current PCP: Taco Caruso MD  PCP verified by CM? (P) Yes    Chart Reviewed: Yes      History Provided by: (P) Patient  Patient Orientation: (P) Alert and Oriented    Patient Cognition: (P) Alert    Hospitalization in the last 30 days (Readmission):  No    If yes, Readmission Assessment in  Navigator will be completed.    Advance Directives:      Code Status: Prior   Patient's Primary Decision Maker is: (P) Legal Next of Kin    Primary Decision Maker: Lavinia Timmons  Child - [phone]    Secondary Decision Maker: SonjaRonaldo  Child - [phone]    Secondary Decision Maker: Devante Robles  Child - [phone]    Secondary Decision Maker: amanda robles  Child - [phone]    Discharge Planning:    Patient lives with: (P) Alone Type of Home: (P) House  Primary Care Giver: (P) Self  Patient Support Systems include: (P) Family Members, Children   Current Financial resources: (P) None  Current community resources: (P) ECF/Home Care  Current services prior to admission: (P) None            Current DME:              Type of Home Care services:  (P) None    ADLS  Prior functional level: (P) Independent in ADLs/IADLs  Current functional level: (P) Independent in ADLs/IADLs    PT AM-PAC:   /24  OT AM-PAC:   /24    Family can provide assistance at DC: (P) Yes  Would you like Case Management to discuss the discharge plan with any other family members/significant others, and if so, who? (P) No  Plans to

## 2025-06-25 VITALS
OXYGEN SATURATION: 97 % | BODY MASS INDEX: 26.28 KG/M2 | HEART RATE: 61 BPM | HEIGHT: 61 IN | SYSTOLIC BLOOD PRESSURE: 105 MMHG | DIASTOLIC BLOOD PRESSURE: 52 MMHG | RESPIRATION RATE: 18 BRPM | WEIGHT: 139.2 LBS | TEMPERATURE: 97.8 F

## 2025-06-25 LAB
ANION GAP SERPL CALCULATED.3IONS-SCNC: 7 MMOL/L (ref 3–16)
BUN SERPL-MCNC: 25 MG/DL (ref 7–20)
CALCIUM SERPL-MCNC: 9 MG/DL (ref 8.3–10.6)
CHLORIDE SERPL-SCNC: 104 MMOL/L (ref 99–110)
CHOLEST SERPL-MCNC: 183 MG/DL (ref 0–199)
CO2 SERPL-SCNC: 27 MMOL/L (ref 21–32)
CREAT SERPL-MCNC: 0.9 MG/DL (ref 0.6–1.2)
DEPRECATED RDW RBC AUTO: 16 % (ref 12.4–15.4)
EST. AVERAGE GLUCOSE BLD GHB EST-MCNC: 111.2 MG/DL
GFR SERPLBLD CREATININE-BSD FMLA CKD-EPI: 63 ML/MIN/{1.73_M2}
GLUCOSE SERPL-MCNC: 91 MG/DL (ref 70–99)
HBA1C MFR BLD: 5.5 %
HCT VFR BLD AUTO: 37.5 % (ref 36–48)
HDLC SERPL-MCNC: 52 MG/DL (ref 40–60)
HGB BLD-MCNC: 12.5 G/DL (ref 12–16)
LDLC SERPL CALC-MCNC: 114 MG/DL
MCH RBC QN AUTO: 29.7 PG (ref 26–34)
MCHC RBC AUTO-ENTMCNC: 33.4 G/DL (ref 31–36)
MCV RBC AUTO: 88.9 FL (ref 80–100)
PLATELET # BLD AUTO: 204 K/UL (ref 135–450)
PMV BLD AUTO: 7.2 FL (ref 5–10.5)
POTASSIUM SERPL-SCNC: 4.3 MMOL/L (ref 3.5–5.1)
RBC # BLD AUTO: 4.22 M/UL (ref 4–5.2)
SODIUM SERPL-SCNC: 138 MMOL/L (ref 136–145)
TRIGL SERPL-MCNC: 85 MG/DL (ref 0–150)
VLDLC SERPL CALC-MCNC: 17 MG/DL
WBC # BLD AUTO: 8 K/UL (ref 4–11)

## 2025-06-25 PROCEDURE — APPSS30 APP SPLIT SHARED TIME 16-30 MINUTES

## 2025-06-25 PROCEDURE — 94640 AIRWAY INHALATION TREATMENT: CPT

## 2025-06-25 PROCEDURE — 2500000003 HC RX 250 WO HCPCS

## 2025-06-25 PROCEDURE — 80061 LIPID PANEL: CPT

## 2025-06-25 PROCEDURE — 85027 COMPLETE CBC AUTOMATED: CPT

## 2025-06-25 PROCEDURE — G0378 HOSPITAL OBSERVATION PER HR: HCPCS

## 2025-06-25 PROCEDURE — 99222 1ST HOSP IP/OBS MODERATE 55: CPT | Performed by: NEUROMUSCULOSKELETAL MEDICINE & OMM

## 2025-06-25 PROCEDURE — 36415 COLL VENOUS BLD VENIPUNCTURE: CPT

## 2025-06-25 PROCEDURE — 6370000000 HC RX 637 (ALT 250 FOR IP)

## 2025-06-25 PROCEDURE — 6360000002 HC RX W HCPCS

## 2025-06-25 PROCEDURE — 96372 THER/PROPH/DIAG INJ SC/IM: CPT

## 2025-06-25 PROCEDURE — 80048 BASIC METABOLIC PNL TOTAL CA: CPT

## 2025-06-25 PROCEDURE — 83036 HEMOGLOBIN GLYCOSYLATED A1C: CPT

## 2025-06-25 RX ORDER — ATORVASTATIN CALCIUM 40 MG/1
40 TABLET, FILM COATED ORAL NIGHTLY
Qty: 30 TABLET | Refills: 3 | Status: SHIPPED | OUTPATIENT
Start: 2025-06-25

## 2025-06-25 RX ORDER — VALSARTAN 160 MG/1
160 TABLET ORAL NIGHTLY
Qty: 30 TABLET | Refills: 3 | Status: SHIPPED | OUTPATIENT
Start: 2025-06-25

## 2025-06-25 RX ORDER — ASPIRIN 81 MG/1
81 TABLET, CHEWABLE ORAL DAILY
Qty: 30 TABLET | Refills: 3 | Status: SHIPPED | OUTPATIENT
Start: 2025-06-26

## 2025-06-25 RX ORDER — ATORVASTATIN CALCIUM 40 MG/1
40 TABLET, FILM COATED ORAL NIGHTLY
Status: DISCONTINUED | OUTPATIENT
Start: 2025-06-25 | End: 2025-06-25 | Stop reason: HOSPADM

## 2025-06-25 RX ADMIN — Medication 10 ML: at 08:22

## 2025-06-25 RX ADMIN — Medication 2 PUFF: at 07:45

## 2025-06-25 RX ADMIN — ENOXAPARIN SODIUM 40 MG: 100 INJECTION SUBCUTANEOUS at 08:22

## 2025-06-25 RX ADMIN — CETIRIZINE HYDROCHLORIDE 5 MG: 10 TABLET, FILM COATED ORAL at 08:22

## 2025-06-25 RX ADMIN — FLUTICASONE PROPIONATE 2 SPRAY: 50 SPRAY, METERED NASAL at 08:22

## 2025-06-25 NOTE — CARE COORDINATION
CASE MANAGEMENT DISCHARGE SUMMARY      Discharge to: Home -NN    Precertification completed: N/A  Hospital Exemption Notification (HENS) completed: N/A    IMM given: (date) 6/24/25    New Durable Medical Equipment ordered/agency: N/A    Transportation:    Family/car: Personal      Confirmed discharge plan with:     Patient: yes     Family:  yes          RN, name: Lorrie    Note: Discharging nurse to complete LEONIDES, reconcile AVS, and place final copy with patient's discharge packet. RN to ensure that written prescriptions for  Level II medications are sent with patient to the facility as per protocol.    Saida Nunez, RN

## 2025-06-25 NOTE — PROGRESS NOTES
Physical Therapy    Therapy orders received and Pt chart reviewed. Per RN and OT pt INDEP in room. Pt has no acute PT needs at this time. Pt at baseline. Will sign off.          Maureen Lopez, PT

## 2025-06-25 NOTE — PLAN OF CARE
Problem: Discharge Planning  Goal: Discharge to home or other facility with appropriate resources  6/25/2025 1130 by Lorrie Lozano, RN  Outcome: Adequate for Discharge  6/25/2025 1054 by Lorrie Lozano RN  Outcome: Progressing  Flowsheets (Taken 6/25/2025 0309 by Patt Chandler RN)  Discharge to home or other facility with appropriate resources:   Identify barriers to discharge with patient and caregiver   Arrange for needed discharge resources and transportation as appropriate   Identify discharge learning needs (meds, wound care, etc)   Refer to discharge planning if patient needs post-hospital services based on physician order or complex needs related to functional status, cognitive ability or social support system  6/25/2025 0052 by Patt Chandler RN  Outcome: Progressing  Flowsheets  Taken 6/24/2025 2300 by Patt Chandler RN  Discharge to home or other facility with appropriate resources:   Identify barriers to discharge with patient and caregiver   Arrange for needed discharge resources and transportation as appropriate   Identify discharge learning needs (meds, wound care, etc)   Refer to discharge planning if patient needs post-hospital services based on physician order or complex needs related to functional status, cognitive ability or social support system  Taken 6/24/2025 1942 by Patt Chandler RN  Discharge to home or other facility with appropriate resources:   Identify barriers to discharge with patient and caregiver   Arrange for needed discharge resources and transportation as appropriate   Identify discharge learning needs (meds, wound care, etc)   Refer to discharge planning if patient needs post-hospital services based on physician order or complex needs related to functional status, cognitive ability or social support system  Taken 6/24/2025 1608 by Carol Spears, RN  Discharge to home or other facility with appropriate resources:   Identify barriers to discharge with patient and

## 2025-06-25 NOTE — PROGRESS NOTES
Occupational Therapy  Chart reviewed. Spoke with RN who reports that patient has been independent in her room and may no longer require therapy services. Spoke with patient who reports vision changes have resolved and denies any other deficits. OT completed brief OT screen. Patient denies double/blurry vision, AROM/tracking/scanning/saccades WNL, BUE strength 5/5, BUE/BLE AROM WNL, coordination WFL. Patient able to complete simple transfers, ambulation, bed mobility independently. No OT needs per brief screen. Patient safe to return home. No further OT services warranted at this time. OT orders to be discontinued. No charge    Mindy Contreras OTR/L [id number]

## 2025-06-25 NOTE — PROGRESS NOTES
[facility] Medicine Progress Note  V 5.17  I personally saw the patient and made/approved the management plan and take responsibility for the patient management. I have personally examined the patient independently. I have reviewed the patient's available data,including medical history and recent test results. Reviewed and discussed note as documented by ANN student.  I agree with the physical exam findings, assessment and plan. I personally performed a substantive portion of the visit including all aspects of the medical decision making.      Electronically signed by FLOR Vicente CNP on 6/25/25 at 3:45 PM EDT    Date of Admission: 6/24/2025    Hospital Day: 2      Chief Admission Complaint:  Double vision    Subjective:  Patient reports she is still having double vision when she removes her glasses but thinks she would benefit from seeing her optometrist. She denies any headaches, dizziness or additional vision changes. States she would like to go home today.     Presenting Admission History:   84 y.o. female with past medical history significant for cochlear implant (2012), hypertension, IIH, fall, chronic UTI, hep C, CSF leak, left pneumonectomy.  Presented to [facility] with diplopia that started around 1900 on 6/23.  The double vision is present when both eyes are open, but not when she covers one eye.  Also reports that it is not present when she has her glasses on.  This is associated with headache, dizziness, and slight confusion.  Had a stomach bug over the weekend, but denies nausea since Sunday.  Denies association with speech disturbances, paresthesias.  She also reports falling out of bed last night.  There are no aggravating or alleviating symptoms.  No radiation.     Assessment/Plan:        Diplopia.  Possible etiologies include CVA, TIA, migraine, IIH, ophthalmological changes. CT head: no acute intracranial hemorrhage or mass effect.  CTA head/neck: no acute vascular

## 2025-06-25 NOTE — PROGRESS NOTES
Janae Casillas  Neurology Follow-up  [facility] Neurology    Date of Service: 6/25/2025    Subjective:   CC: Follow up today regarding: Diplopia     Events noted. Chart and lab reviewed. Pt reports the diplopia has resolved today. She is unable to undergo MRI d/t her cochlear implant and is eager to go home.       ROS : A 10-12 system review obtained and updated today and is unremarkable except as mentioned  in my interval history.     Medication, past medical history, social history, and family history reviewed.    Objective:  Exam:   Constitutional:   Vitals:    06/24/25 2300 06/25/25 0309 06/25/25 0745 06/25/25 0817   BP: (!) 144/62 117/60  100/62   Pulse: 63 64 81 68   Resp: 18 17 16 16   Temp: 97.5 °F (36.4 °C) 97.9 °F (36.6 °C)  97.9 °F (36.6 °C)   TempSrc: Oral Oral  Oral   SpO2: 97% 96% 97% 97%   Weight:       Height:           Pt is in no acute distress  She is alert, oriented x4  Speech is clear, fluence  There is no facial droop or ptosis  EOMs are intact. Gaze is conjugate. She denies diplopia today.  She is moving her extremities spontaneously with full strength  Denies numbness/tingling.           Data:  LABS:   Lab Results   Component Value Date/Time     06/25/2025 05:40 AM    K 4.3 06/25/2025 05:40 AM     06/25/2025 05:40 AM    CO2 27 06/25/2025 05:40 AM    BUN 25 06/25/2025 05:40 AM    CREATININE 0.9 06/25/2025 05:40 AM    GFRAA >60 09/24/2022 05:55 AM    GFRAA >60 01/06/2012 01:00 PM    LABGLOM 63 06/25/2025 05:40 AM    GLUCOSE 91 06/25/2025 05:40 AM    PHOS 2.8 09/21/2022 06:42 AM    MG 2.10 09/23/2022 06:49 AM    CALCIUM 9.0 06/25/2025 05:40 AM     Lab Results   Component Value Date/Time    WBC 8.0 06/25/2025 05:40 AM    RBC 4.22 06/25/2025 05:40 AM    HGB 12.5 06/25/2025 05:40 AM    HCT 37.5 06/25/2025 05:40 AM    MCV 88.9 06/25/2025 05:40 AM    RDW 16.0 06/25/2025 05:40 AM     06/25/2025 05:40 AM     Lab Results   Component Value Date    INR 1.09 06/24/2025    PROTIME 14.3  06/24/2025       CTA HEAD NECK W CONTRAST  Result Date: 6/24/2025  1.  No acute vascular abnormality is identified in the head or neck. 2.  Mild atherosclerotic disease present in the right common carotid artery. Findings were called to Dr. Rankin by me at the time of this dictation. Electronically signed by Chinmay Sampson MD      CT HEAD WO CONTRAST  Result Date: 6/24/2025  1.  No acute intracranial hemorrhage or mass effect. Findings were called to Dr. Rankin by me at the time of this dictation. Electronically signed by Chinmay Sampson MD        I reviewed blood testing and other test results and discussed results with the patient      Impression:  Acute diplopia-- Onset 6/23 evening, now resolved. Lasted 24+ hours. Unable to undergo MRI. Ddx includes minor CVA, primary ophthalmologic problem, or myasthenia gravis (less likely, fatigue test was negative and denies weakness, dysphagia, fatigue).  Hx IIH-- Diagnosed in 30s, controlled through lifestyle modifications.   UTI         Recommendation  Treatment of UTI per primary team  Blood pressure <130/80  Continue aspirin 81mg daily  Decrease atorvastatin to 40mg nightly  F/u outpatient neurology 3 mos  F/u outpatient ophthalmology.  Ok for discharge from neurology perspective      Electronically signed by FLOR Martinez CNP on 6/25/25 at 9:59 AM EDT       This dictation was generated by voice recognition computer software. Although all attempts are made to edit the dictation for accuracy, there may be errors in the transcription that are not intended.

## 2025-06-25 NOTE — PROGRESS NOTES
4 Eyes Skin Assessment and Patient belongings     The patient is being assess for  Admission    I agree that 2 Nurses have performed a thorough Head to Toe Skin Assessment on the patient. ALL assessment sites listed below have been assessed.       Areas assessed by both nurses: Yes  [x]   Head, Face, and Ears   [x]   Shoulders, Back, and Chest  [x]   Arms, Elbows, and Hands   [x]   Coccyx, Sacrum, and IschIum  [x]   Legs, Feet, and Heels        Does the Patient have Skin Breakdown?  No     Patient has scattered bruising, and left lower leg swelling          Cliff Prevention initiated:  Yes   Wound Care Orders initiated:  No      [facility] nurse consulted for Pressure Injury (Stage 3,4, Unstageable, DTI, NWPT, and Complex wounds), New and Established Ostomies:  No      I agree that 2 Nurses have reviewed patient belongings with the patient/family and documented in the flowsheet upon admission or transfer to the unit.     Belongings  Dental Appliances: None  Vision - Corrective Lenses: Eyeglasses  Hearing Aid: Other (Comment)  Clothing: Footwear, Pants, Shirt, Socks, Undergarments  Jewelry: None  Electronic Devices: None  Weapons (Notify Protective Services/Security): None  Home Medications: None  Valuables Given To: Patient  Provide Name(s) of Who Valuable(s) Were Given To: pt       Nurse 1 eSignature: Electronically signed by Patt Chandler RN on 6/24/25 at 10:39 PM EDT    **SHARE this note so that the co-signing nurse is able to place an eSignature**    Nurse 2 eSignature: Electronically signed by Alisha Zazueta RN on 6/24/25 at 10:48 PM EDT

## 2025-06-25 NOTE — DISCHARGE SUMMARY
Hospital Medicine Discharge Summary    Patient: Janae Casillas   : 1940     Hospital:  [facility]  Admit Date: 2025   Discharge Date:   Disposition:  Home   Code status:  Full  Condition at Discharge: Stable  Primary Care Provider: Taco Caruso MD    Admitting Provider: Isidra Bojorquez MD  Discharge Provider: Jesica Jaime, APRN - CNP     Discharge Diagnoses:      Active Hospital Problems    Diagnosis     Diplopia [H53.2]      Presenting Admission History:  84 y.o. female with past medical history significant for cochlear implant (), hypertension, IIH, fall, chronic UTI, hep C, CSF leak, left pneumonectomy.  Presented to [facility] with diplopia that started around 1900 on .  The double vision is present when both eyes are open, but not when she covers one eye.  Also reports that it is not present when she has her glasses on.  This is associated with headache, dizziness, and slight confusion.  Had a stomach bug over the weekend, but denies nausea since .  Denies association with speech disturbances, paresthesias.  She also reports falling out of bed last night.  There are no aggravating or alleviating symptoms.  No radiation.       Assessment/Plan:       Diplopia.  Possible etiologies include CVA, TIA, migraine, IIH.  CT head: no acute intracranial hemorrhage or mass effect.  CTA head/neck: no acute vascular abnormality identified in the head or neck; mild atherosclerotic disease in R common carotid.  MRI brain w and w/o, and MRV w and w/o ordered  - cochlear implant not MRI compatible.  Stroke team was contacted by ED, TNK not recommended.  Continue ASA, statin (new this admission).  PT/OT/SLP were consulted.  Neuro was consulted: follow up as outpatient.       UTI, present prior to arrival.  Was started on nitrofurantoin 50mg daily last week - continue on discharge.  UA on admission, bland.       Essential hypertension.

## 2025-06-25 NOTE — PLAN OF CARE
Problem: Discharge Planning  Goal: Discharge to home or other facility with appropriate resources  6/25/2025 1054 by Lorrie Lozano, RN  Outcome: Progressing  Flowsheets (Taken 6/25/2025 0309 by Patt Chandler RN)  Discharge to home or other facility with appropriate resources:   Identify barriers to discharge with patient and caregiver   Arrange for needed discharge resources and transportation as appropriate   Identify discharge learning needs (meds, wound care, etc)   Refer to discharge planning if patient needs post-hospital services based on physician order or complex needs related to functional status, cognitive ability or social support system  6/25/2025 0052 by Patt Chandler RN  Outcome: Progressing  Flowsheets  Taken 6/24/2025 2300 by Patt Chandler RN  Discharge to home or other facility with appropriate resources:   Identify barriers to discharge with patient and caregiver   Arrange for needed discharge resources and transportation as appropriate   Identify discharge learning needs (meds, wound care, etc)   Refer to discharge planning if patient needs post-hospital services based on physician order or complex needs related to functional status, cognitive ability or social support system  Taken 6/24/2025 1942 by Patt Chandler RN  Discharge to home or other facility with appropriate resources:   Identify barriers to discharge with patient and caregiver   Arrange for needed discharge resources and transportation as appropriate   Identify discharge learning needs (meds, wound care, etc)   Refer to discharge planning if patient needs post-hospital services based on physician order or complex needs related to functional status, cognitive ability or social support system  Taken 6/24/2025 1608 by Carol Spears, RN  Discharge to home or other facility with appropriate resources:   Identify barriers to discharge with patient and caregiver   Arrange for needed discharge resources and transportation as

## 2025-06-25 NOTE — PROGRESS NOTES
IV and tele removed. All discharge instructions given and all questions answered. Patient wheeled down to main entrance with family, no complications. All belongings sent with patient at time of discharge.

## 2025-06-25 NOTE — PLAN OF CARE
Problem: Discharge Planning  Goal: Discharge to home or other facility with appropriate resources  Outcome: Progressing  Flowsheets  Taken 6/24/2025 2300 by Patt Chandler RN  Discharge to home or other facility with appropriate resources:   Identify barriers to discharge with patient and caregiver   Arrange for needed discharge resources and transportation as appropriate   Identify discharge learning needs (meds, wound care, etc)   Refer to discharge planning if patient needs post-hospital services based on physician order or complex needs related to functional status, cognitive ability or social support system  Taken 6/24/2025 1942 by Patt Chandler RN  Discharge to home or other facility with appropriate resources:   Identify barriers to discharge with patient and caregiver   Arrange for needed discharge resources and transportation as appropriate   Identify discharge learning needs (meds, wound care, etc)   Refer to discharge planning if patient needs post-hospital services based on physician order or complex needs related to functional status, cognitive ability or social support system  Taken 6/24/2025 1608 by Carol Spears RN  Discharge to home or other facility with appropriate resources:   Identify barriers to discharge with patient and caregiver   Arrange for needed discharge resources and transportation as appropriate     Problem: Pain  Goal: Verbalizes/displays adequate comfort level or baseline comfort level  Outcome: Progressing  Flowsheets  Taken 6/24/2025 2300  Verbalizes/displays adequate comfort level or baseline comfort level:   Encourage patient to monitor pain and request assistance   Assess pain using appropriate pain scale   Administer analgesics based on type and severity of pain and evaluate response   Implement non-pharmacological measures as appropriate and evaluate response   Consider cultural and social influences on pain and pain management   Notify Licensed Independent Practitioner

## 2025-06-26 LAB
BACTERIA UR CULT: ABNORMAL
ORGANISM: ABNORMAL

## 2025-09-03 ENCOUNTER — OFFICE VISIT (OUTPATIENT)
Dept: NEUROLOGY | Age: 85
End: 2025-09-03
Payer: MEDICARE

## 2025-09-03 VITALS
OXYGEN SATURATION: 96 % | HEART RATE: 60 BPM | WEIGHT: 146.7 LBS | BODY MASS INDEX: 27.7 KG/M2 | DIASTOLIC BLOOD PRESSURE: 61 MMHG | SYSTOLIC BLOOD PRESSURE: 125 MMHG | HEIGHT: 61 IN

## 2025-09-03 DIAGNOSIS — H53.2 TRANSIENT DIPLOPIA: ICD-10-CM

## 2025-09-03 DIAGNOSIS — G93.2 IIH (IDIOPATHIC INTRACRANIAL HYPERTENSION): Primary | ICD-10-CM

## 2025-09-03 PROCEDURE — 99213 OFFICE O/P EST LOW 20 MIN: CPT | Performed by: STUDENT IN AN ORGANIZED HEALTH CARE EDUCATION/TRAINING PROGRAM

## 2025-09-03 PROCEDURE — 1159F MED LIST DOCD IN RCRD: CPT | Performed by: STUDENT IN AN ORGANIZED HEALTH CARE EDUCATION/TRAINING PROGRAM

## 2025-09-03 PROCEDURE — 1123F ACP DISCUSS/DSCN MKR DOCD: CPT | Performed by: STUDENT IN AN ORGANIZED HEALTH CARE EDUCATION/TRAINING PROGRAM

## 2025-09-03 RX ORDER — FLUCONAZOLE 150 MG/1
150 TABLET ORAL
COMMUNITY
Start: 2025-08-26

## 2025-09-03 RX ORDER — ACETAZOLAMIDE 250 MG/1
TABLET ORAL
Qty: 120 TABLET | Refills: 11 | Status: SHIPPED | OUTPATIENT
Start: 2025-09-03 | End: 2026-09-05

## 2025-09-03 RX ORDER — ACETAZOLAMIDE 125 MG/1
TABLET ORAL
Qty: 21 TABLET | Refills: 0 | Status: SHIPPED | OUTPATIENT
Start: 2025-09-03 | End: 2025-09-17

## 2025-09-03 RX ORDER — FLUTICASONE PROPIONATE AND SALMETEROL 250; 50 UG/1; UG/1
POWDER RESPIRATORY (INHALATION)
COMMUNITY
Start: 2025-06-12